# Patient Record
Sex: FEMALE | Race: WHITE | Employment: FULL TIME | URBAN - METROPOLITAN AREA
[De-identification: names, ages, dates, MRNs, and addresses within clinical notes are randomized per-mention and may not be internally consistent; named-entity substitution may affect disease eponyms.]

---

## 2017-06-16 ENCOUNTER — HOSPITAL ENCOUNTER (OUTPATIENT)
Dept: MAMMOGRAPHY | Age: 57
Discharge: HOME | End: 2017-06-16
Attending: PHYSICIAN ASSISTANT
Payer: COMMERCIAL

## 2017-06-16 DIAGNOSIS — Z12.31 ENCOUNTER FOR SCREENING MAMMOGRAM FOR BREAST CANCER: ICD-10-CM

## 2017-06-16 DIAGNOSIS — M85.89 OSTEOPENIA OF MULTIPLE SITES: ICD-10-CM

## 2017-06-16 DIAGNOSIS — M89.9 BONE DISORDER: ICD-10-CM

## 2017-06-16 PROCEDURE — 77080 DXA BONE DENSITY AXIAL: CPT

## 2017-06-16 PROCEDURE — 77067 SCR MAMMO BI INCL CAD: CPT

## 2017-06-16 NOTE — PROGRESS NOTES
Bone density showed osteopenia if fairly stable but slight decline in hips compared to previous study.   Ensure that she is compliant with OTC calcium 600 mg twice daily along with whatever dose vitamin d we decided is working sufficiently for her to keep levels at goal.

## 2017-07-28 ENCOUNTER — HOSPITAL ENCOUNTER (OUTPATIENT)
Dept: GENERAL RADIOLOGY | Age: 57
Discharge: HOME | End: 2017-07-28
Attending: INTERNAL MEDICINE
Payer: COMMERCIAL

## 2017-07-28 DIAGNOSIS — M25.561 ACUTE PAIN OF RIGHT KNEE: ICD-10-CM

## 2017-07-28 DIAGNOSIS — M79.642 PAIN IN LEFT HAND: ICD-10-CM

## 2017-07-28 PROCEDURE — 73560 X-RAY EXAM OF KNEE 1 OR 2: CPT

## 2017-07-28 PROCEDURE — 73130 X-RAY EXAM OF HAND: CPT

## 2017-07-28 NOTE — PROGRESS NOTES
X-ray showed arthritic changes in the knee - most prominent along the inside portion of the knee with joint space narrowing and spurs. Would she like to try physical therapy first?  I think this would be quite beneficial for her.

## 2017-07-28 NOTE — PROGRESS NOTES
X-ray shows increase in arthritic change throughout the hand but primarily at the base of each finger with small spurs.

## 2017-07-28 NOTE — PROGRESS NOTES
Pt notified and verbalized understanding. She states she would like to try physical therapy and would like somewhere in the same area of our office if possible.

## 2017-08-10 ENCOUNTER — HOSPITAL ENCOUNTER (OUTPATIENT)
Dept: PHYSICAL THERAPY | Age: 57
Discharge: HOME | End: 2017-08-10
Attending: PHYSICIAN ASSISTANT
Payer: COMMERCIAL

## 2017-08-10 DIAGNOSIS — M25.561 ACUTE PAIN OF RIGHT KNEE: ICD-10-CM

## 2017-08-10 PROCEDURE — 97161 PT EVAL LOW COMPLEX 20 MIN: CPT

## 2017-08-10 NOTE — PROGRESS NOTES
Ambulatory/Rehab Services H2 Model Falls Risk Assessment    Risk Factor Pts. ·   Confusion/Disorientation/Impulsivity  []    4 ·   Symptomatic Depression  []   2 ·   Altered Elimination  []   1 ·   Dizziness/Vertigo  []   1 ·   Gender (Male)  []   1 ·   Any administered antiepileptics (anticonvulsants):  []   2 ·   Any administered benzodiazepines:  []   1 ·   Visual Impairment (specify):  []   1 ·   Portable Oxygen Use  []   1 ·   Orthostatic ? BP  []   1 ·   History of Recent Falls (within 3 mos.)  []   5     Ability to Rise from Chair (choose one) Pts. ·   Ability to rise in a single movement  [x]   0 ·   Pushes up, successful in one attempt  []   1 ·   Multiple attempts, but successful  []   3 ·   Unable to rise without assistance  []   4   Total: (5 or greater = High Risk) 0     Falls Prevention Plan:   []                Physical Limitations to Exercise (specify):   []                Mobility Assistance Device (type):   []                Exercise/Equipment Adaptation (specify):    ©2010 [facility] [place] Patent #[id number].  Federal Law prohibits the replication, distribution or use without written permission from [facility] [employer]

## 2017-08-10 NOTE — PROGRESS NOTES
Sharla Bret  : 1960 Therapy Center at [address], [address], [address] [facility]  [phone]   [phone]       OUTPATIENT PHYSICAL THERAPY:Initial Assessment 2017    ICD-10: Treatment Diagnosis: Pain in right knee (M25.561)  Precautions/Allergies:   Review of patient's allergies indicates no known allergies. Fall Risk Score: 0 (? 5 = High Risk)  MD Orders: referral to physical therapy  MEDICAL/REFERRING DIAGNOSIS:  Acute pain of right knee [M25.561]   DATE OF ONSET: chronic, 6-8 months ago  REFERRING PHYSICIAN: Aisha Mccall PA  RETURN PHYSICIAN APPOINTMENT: next month for a physical      INITIAL ASSESSMENT:  Ms. Cindy Vanegas presents with complaints of right knee pain. She had previous right knee scope 6 years and her knee started to bother her again about 6-8 months ago. She presents with decreased R LE strength, decreased ROM, and increased pain. She does need to be on her feet all day for work duties. She will benefit from skilled physical therapy to increase functional mobility and help decrease pain. PROBLEM LIST (Impacting functional limitations):  1. Decreased Strength  2. Decreased ADL/Functional Activities  3. Increased Pain INTERVENTIONS PLANNED:  1. Therapeutic Exercise/Strengthening   2. Manual therapy for joint mobilizations and soft tissue mobilization  3. Aquatic therapy  4. Modalities as needed for pain   TREATMENT PLAN:  Effective Dates:17 TO 10-6-17  Frequency/Duration: 1 time a week for 6-8 weeks  GOALS: (Goals have been discussed and agreed upon with patient.)    Discharge Goals: Time Frame: 6-8 weeks  1. Patient will increase right lower extremity strength tot 5/5 MMT grade to improve mobility for work. 2. Pt will report pain 1/10 with daily and work activities. 3. Pt will score 62/80 on LEFS for improved mobility. 4. Pt will be independent with HEP.      Rehabilitation Potential For Stated Goals: Good  Regarding Betina Enciso Rosa's therapy, I certify that the treatment plan above will be carried out by a therapist or under their direction. Thank you for this referral,    Lewis Genao, PT       Referring Physician Signature: RON Traore              Date                      HISTORY:   History of Present Injury/Illness (Reason for Referral):  Knee surgery 6 years ago. Pain started 6-8 months ago. Then back of knee gets tight and hard to bend the knee. Pain only when she bends it or sits for long periods of time and then stands up. R hip hurts when she sits down and get up. She did start taking naproxen last week and feels like that might be helping a little bit. Unable to cross her legs when she sits. No pain at rest. Pain 1/10. Past Medical History/Comorbidities:   HTN, thyroid problems, right knee surgery 6 years ago, appendectomy  Social History/Living Environment:     Lives alone, 2 steps to get inside. Prior Level of Function/Work/Activity:  Works 4/ 10 hour shifts at [employer]. Stands on her feet the whole day with 1 hour lunch break. She wears daskos at work. Would like to be able to get up and down off the floor. Current Medications:  Naproxen, synthroid, ergocalciferol, lisinopril, baby aspirin, glucosamine, vit D   Date Last Reviewed:  8-10-17   Number of Personal Factors/Comorbidities that affect the Plan of Care: 1-2: MODERATE COMPLEXITY   EXAMINATION:   Observation/Orthostatic Postural Assessment:          Pt in no distress.  She ambulates into the clinic with normalized gait  Palpation:          No tenderness around the R knee  ROM:    LLE AROM  L Knee Flexion: 130  L Knee Extension: 2 (hyperextension)    RLE AROM  R Knee Flexion: 125  R Knee Extension: 0      strength:      LLE Strength  L Hip Flexion: 5  L Hip Extension: 5  L Hip ABduction: 5  L Hip Internal Rotation: 5  L Hip External Rotation: 5  L Knee Flexion: 5  L Knee Extension: 5    RLE Strength  R Hip Flexion: 5  R Hip Extension: 4-  R Hip ABduction: 4-  R Hip Internal Rotation: 4-  R Hip External Rotation: 4  R Knee Flexion: 4  R Knee Extension: 5      Special Tests:          Negative knee varus and valgus stress test, negative anterior and posterior drawer R knee, good hip ROM in supine and prone    Body Structures Involved:  1. Joints  2. Muscles Body Functions Affected:  1. Neuromusculoskeletal Activities and Participation Affected:  1. Community, Social and [place]   Number of elements (examined above) that affect the Plan of Care: 3: MODERATE COMPLEXITY   CLINICAL PRESENTATION:   Presentation: Stable and uncomplicated: LOW COMPLEXITY   CLINICAL DECISION MAKING:   Outcome Measure: Tool Used: Lower Extremity Functional Scale (LEFS)  Score:  Initial: 49/80 Most Recent: X/80 (Date: -- )   Interpretation of Score: 20 questions each scored on a 5 point scale with 0 representing \"extreme difficulty or unable to perform\" and 4 representing \"no difficulty\". The lower the score, the greater the functional disability. 80/80 represents no disability. Minimal detectable change is 9 points. Medical Necessity:   · Patient is expected to demonstrate progress in strength, range of motion and balance to improve mobility and decrease pain. Reason for Services/Other Comments:  · Patient continues to require skilled intervention due to decreased R knee ROM and LE strength and increased pain. Use of outcome tool(s) and clinical judgement create a POC that gives a: Clear prediction of patient's progress: LOW COMPLEXITY            TREATMENT:   (In addition to Assessment/Re-Assessment sessions the following treatments were rendered)  Pre-treatment Symptoms/Complaints:  Pain when she bends the knee. Pain: Initial:   1/10 Post Session:  1/10     Therapeutic Exercise: ( 5 minutes):  instructed in initial HEP with quad sets, SLR, side lying hip abduction, clam shell, and sitting B hip IR. Pt performed 10 reps of each exercise.      Treatment/Session Assessment: · Response to Treatment:  Good return demonstration of HEP. · Compliance with Program/Exercises: Will assess as treatment progresses. · Recommendations/Intent for next treatment session: \"Next visit will focus on review and update HEP\".   Total Treatment Duration: 40 minutes  PT Patient Time In/Time Out  Time In: 1525  Time Out: 1605  Jasbir Rios PT

## 2017-08-17 ENCOUNTER — HOSPITAL ENCOUNTER (OUTPATIENT)
Dept: PHYSICAL THERAPY | Age: 57
Discharge: HOME | End: 2017-08-17
Attending: PHYSICIAN ASSISTANT
Payer: COMMERCIAL

## 2017-08-17 NOTE — PROGRESS NOTES
Viridiana Morrison  [facility] 1960 [facility]  [address], [address], [place] [address].  [phone]   [phone]       OUTPATIENT PHYSICAL THERAPY:Cancellation Note 2017    ICD-10: Treatment Diagnosis: Pain in right knee (M25.561)  Precautions/Allergies:   Review of patient's allergies indicates no known allergies. Fall Risk Score: 0 (? 5 = High Risk)  MD Orders: referral to physical therapy  MEDICAL/REFERRING DIAGNOSIS:  Acute pain of right knee [M25.561]   DATE OF ONSET: chronic, 6-8 months ago  REFERRING PHYSICIAN: Aisha Mccall PA  RETURN PHYSICIAN APPOINTMENT: next month for a physical         MsHerb Lesli Isidro called and cancelled her physical therapy appointment for today.

## 2017-08-24 ENCOUNTER — HOSPITAL ENCOUNTER (OUTPATIENT)
Dept: PHYSICAL THERAPY | Age: 57
Discharge: HOME | End: 2017-08-24
Attending: PHYSICIAN ASSISTANT
Payer: COMMERCIAL

## 2017-08-24 PROCEDURE — 97110 THERAPEUTIC EXERCISES: CPT

## 2017-08-24 NOTE — PROGRESS NOTES
Rsoy Cristhian  : 1960 Therapy Center at [facility] [address]  [address], [address], [address] [facility]  [phone]   [phone]       OUTPATIENT PHYSICAL THERAPY:Daily Note 2017    ICD-10: Treatment Diagnosis: Pain in right knee (M25.561)  Precautions/Allergies:   Review of patient's allergies indicates no known allergies. Fall Risk Score: 0 (? 5 = High Risk)  MD Orders: referral to physical therapy  MEDICAL/REFERRING DIAGNOSIS:  Acute pain of right knee [M25.561]   DATE OF ONSET: chronic, 6-8 months ago  REFERRING PHYSICIAN: Aisha Mccall PA  RETURN PHYSICIAN APPOINTMENT: next month for a physical      INITIAL ASSESSMENT:  Ms. Alexis Suero presents with complaints of right knee pain. She had previous right knee scope 6 years and her knee started to bother her again about 6-8 months ago. She presents with decreased R LE strength, decreased ROM, and increased pain. She does need to be on her feet all day for work duties. She will benefit from skilled physical therapy to increase functional mobility and help decrease pain. PROBLEM LIST (Impacting functional limitations):  1. Decreased Strength  2. Decreased ADL/Functional Activities  3. Increased Pain INTERVENTIONS PLANNED:  1. Therapeutic Exercise/Strengthening   2. Manual therapy for joint mobilizations and soft tissue mobilization  3. Aquatic therapy  4. Modalities as needed for pain   TREATMENT PLAN:  Effective Dates:17 TO 10-6-17  Frequency/Duration: 1 time a week for 6-8 weeks  GOALS: (Goals have been discussed and agreed upon with patient.)    Discharge Goals: Time Frame: 6-8 weeks  1. Patient will increase right lower extremity strength tot 5/5 MMT grade to improve mobility for work. 2. Pt will report pain 1/10 with daily and work activities. 3. Pt will score 62/80 on LEFS for improved mobility. 4. Pt will be independent with HEP.      Rehabilitation Potential For Stated Goals: Good                HISTORY: History of Present Injury/Illness (Reason for Referral):  Knee surgery 6 years ago. Pain started 6-8 months ago. Then back of knee gets tight and hard to bend the knee. Pain only when she bends it or sits for long periods of time and then stands up. R hip hurts when she sits down and get up. She did start taking naproxen last week and feels like that might be helping a little bit. Unable to cross her legs when she sits. No pain at rest. Pain 1/10. Past Medical History/Comorbidities:   HTN, thyroid problems, right knee surgery 6 years ago, appendectomy  Social History/Living Environment:     Lives alone, 2 steps to get inside. Prior Level of Function/Work/Activity:  Works 4/ 10 hour shifts at [employer]. Stands on her feet the whole day with 1 hour lunch break. She wears daskos at work. Would like to be able to get up and down off the floor. Current Medications:  Naproxen, synthroid, ergocalciferol, lisinopril, baby aspirin, glucosamine, vit D   Date Last Reviewed:  8-24-17   Number of Personal Factors/Comorbidities that affect the Plan of Care: 1-2: MODERATE COMPLEXITY   EXAMINATION:   Observation/Orthostatic Postural Assessment:          Pt in no distress. She ambulates into the clinic with normalized gait  Palpation:          No tenderness around the R knee  ROM:                strength:                  Special Tests:          Negative knee varus and valgus stress test, negative anterior and posterior drawer R knee, good hip ROM in supine and prone    Body Structures Involved:  1. Joints  2. Muscles Body Functions Affected:  1. Neuromusculoskeletal Activities and Participation Affected:  1. Community, Social and [place]   Number of elements (examined above) that affect the Plan of Care: 3: MODERATE COMPLEXITY   CLINICAL PRESENTATION:   Presentation: Stable and uncomplicated: LOW COMPLEXITY   CLINICAL DECISION MAKING:   Outcome Measure:    Tool Used: Lower Extremity Functional Scale (LEFS)  Score:  Initial: 49/80 Most Recent: X/80 (Date: -- )   Interpretation of Score: 20 questions each scored on a 5 point scale with 0 representing \"extreme difficulty or unable to perform\" and 4 representing \"no difficulty\". The lower the score, the greater the functional disability. 80/80 represents no disability. Minimal detectable change is 9 points. Medical Necessity:   · Patient is expected to demonstrate progress in strength, range of motion and balance to improve mobility and decrease pain. Reason for Services/Other Comments:  · Patient continues to require skilled intervention due to decreased R knee ROM and LE strength and increased pain. Use of outcome tool(s) and clinical judgement create a POC that gives a: Clear prediction of patient's progress: LOW COMPLEXITY            TREATMENT:   (In addition to Assessment/Re-Assessment sessions the following treatments were rendered)  Pre-treatment Symptoms/Complaints:  Pain when she bends the knee. Pain: Initial:   1/10 Post Session:  1/10     Therapeutic Exercise: (25 Minutes): LE strengthening exercise per grid below. Moderate visual, verbal cues for technique. Date:  8-24-17 Date:   Date:     Activity/Exercise Parameters Parameters Parameters   Quad set 20x     SLR 2x10     Side lying hip abduction 2x10     Clam  yellow 2x10     Reverse clam Yellow 2x10     Long arc quad 3#2x10     Shuttle press 50# 2x10         Treatment/Session Assessment:    · Response to Treatment:  No complaints of pain with exercises. · Compliance with Program/Exercises: Will assess as treatment progresses. · Recommendations/Intent for next treatment session: \"Next visit will focus on review and update HEP\".   Total Treatment Duration: 25 minutes  PT Patient Time In/Time Out  Time In: 1015  Time Out: 29 Adwoa Lowe

## 2017-10-25 NOTE — PROGRESS NOTES
Rubina Lawler  : 1960 Therapy Center at [facility] [address]  [facility], [address], [address] [facility]  [phone]   [phone]       OUTPATIENT PHYSICAL THERAPY:Discontinuation Summary 10/25/2017    ICD-10: Treatment Diagnosis: Pain in right knee (M25.561)  Precautions/Allergies:   Review of patient's allergies indicates no known allergies. Fall Risk Score: 0 (? 5 = High Risk)  MD Orders: referral to physical therapy  MEDICAL/REFERRING DIAGNOSIS:  Acute pain of right knee [M25.561]   DATE OF ONSET: chronic, 6-8 months ago  REFERRING PHYSICIAN: Pelon Mccall 89, PA  RETURN PHYSICIAN APPOINTMENT: next month for a physical    Rubina Lawler has been seen in physical therapy from 8-10-17 to 17 for 2 visits. Treatment has been discontinued at this time due to patient failing to return for additional treatment. Unable to assess goals due to patient attending 2 physical therapy visits. Thank you for this referral.           PLAN:  Discontinue patient from physical therapy     GOALS: (Goals have been discussed and agreed upon with patient.)    Discharge Goals: Time Frame: 6-8 weeks UNABLE TO ASSESS   1. Patient will increase right lower extremity strength tot 5/5 MMT grade to improve mobility for work. 2. Pt will report pain 1/10 with daily and work activities. 3. Pt will score 62/80 on LEFS for improved mobility. 4. Pt will be independent with HEP.      Rehabilitation Potential For Stated Goals: Good    THANK YOU FOR THIS REFERRAL,     Haily HERMAN, pt

## 2018-05-24 ENCOUNTER — HOSPITAL ENCOUNTER (OUTPATIENT)
Dept: GENERAL RADIOLOGY | Age: 58
Discharge: HOME | End: 2018-05-24
Attending: PHYSICIAN ASSISTANT
Payer: COMMERCIAL

## 2018-05-24 DIAGNOSIS — M79.674 GREAT TOE PAIN, RIGHT: ICD-10-CM

## 2018-05-24 PROCEDURE — 73660 X-RAY EXAM OF TOE(S): CPT

## 2018-05-24 NOTE — PROGRESS NOTES
I called pt and informed. Pt verbalized understanding. I tried scheduling the appt for her due to no one was here to schedule. I could not make the appt because I don't know how to. Pt was rude to me. I apologized and explained that I only been working here for two weeks and that's the reason I cant make the appt. . Pt just hung up on me

## 2018-05-24 NOTE — PROGRESS NOTES
Due to location and persistence of pain she needs to come in for treatment. We need to do some labs to rule out gout since x-ray is negative.

## 2018-10-18 ENCOUNTER — HOSPITAL ENCOUNTER (OUTPATIENT)
Dept: MAMMOGRAPHY | Age: 58
Discharge: HOME | End: 2018-10-18
Attending: PHYSICIAN ASSISTANT
Payer: COMMERCIAL

## 2018-10-18 DIAGNOSIS — Z12.31 ENCOUNTER FOR SCREENING MAMMOGRAM FOR BREAST CANCER: ICD-10-CM

## 2018-10-18 PROCEDURE — 77067 SCR MAMMO BI INCL CAD: CPT

## 2018-10-19 NOTE — PROGRESS NOTES
Mammogram showed scattered fibroglandular tissue & benign appearing calcifications which are unchanged. Remind to do monthly self breast exams. Plan to repeat imaging in 1 year.

## 2018-12-26 ENCOUNTER — HOSPITAL ENCOUNTER (OUTPATIENT)
Dept: GENERAL RADIOLOGY | Age: 58
Discharge: HOME | End: 2018-12-26
Attending: NURSE PRACTITIONER
Payer: COMMERCIAL

## 2018-12-26 DIAGNOSIS — S89.92XA KNEE INJURY, LEFT, INITIAL ENCOUNTER: ICD-10-CM

## 2018-12-26 PROCEDURE — 73562 X-RAY EXAM OF KNEE 3: CPT

## 2018-12-28 ENCOUNTER — HOSPITAL ENCOUNTER (OUTPATIENT)
Dept: MRI IMAGING | Age: 58
Discharge: HOME | End: 2018-12-28
Attending: NURSE PRACTITIONER
Payer: COMMERCIAL

## 2018-12-28 DIAGNOSIS — M25.462 EFFUSION OF LEFT KNEE JOINT: ICD-10-CM

## 2018-12-28 PROCEDURE — 73721 MRI JNT OF LWR EXTRE W/O DYE: CPT

## 2019-03-04 ENCOUNTER — HOSPITAL ENCOUNTER (OUTPATIENT)
Dept: GENERAL RADIOLOGY | Age: 59
Discharge: HOME | End: 2019-03-04
Attending: NURSE PRACTITIONER
Payer: COMMERCIAL

## 2019-03-04 PROCEDURE — 72100 X-RAY EXAM L-S SPINE 2/3 VWS: CPT

## 2019-03-15 ENCOUNTER — HOSPITAL ENCOUNTER (OUTPATIENT)
Dept: PHYSICAL THERAPY | Age: 59
Discharge: HOME | End: 2019-03-15
Attending: NURSE PRACTITIONER
Payer: COMMERCIAL

## 2019-03-15 DIAGNOSIS — M47.816 SPONDYLOSIS OF LUMBAR REGION WITHOUT MYELOPATHY OR RADICULOPATHY: ICD-10-CM

## 2019-03-15 PROCEDURE — 97161 PT EVAL LOW COMPLEX 20 MIN: CPT

## 2019-03-15 PROCEDURE — 97110 THERAPEUTIC EXERCISES: CPT

## 2019-03-15 NOTE — PROGRESS NOTES
Seth Rogers  : 1960  Payor: Michael Parry / Plan: Ghazal Button RPN / Product Type: [employer]ical /  [address] at [address] [facility], [address], [address]  [phone]   [phone]                                                          Paula Benoit NP      OUTPATIENT PHYSICAL THERAPY: Daily Treatment Note 3/15/2019 Visit Count:  1     ICD-10: Treatment Diagnosis:    Low back pain (M54.5)        Muscle weakness (generalized) (M62.81)   Muscle spasm of back (M62.830)       Pre-treatment Symptoms/Complaints:  See initial eval  Pain: Initial:   5/10 Post Session:  5/10   Medications Last Reviewed:  3/15/2019   Updated Objective Findings:  See initial eval   TREATMENT:   THERAPEUTIC EXERCISE: (20 minutes):  Exercises per grid below to improve mobility, strength and balance. Required minimal visual, verbal and manual cues to promote proper body alignment and promote proper body posture. Progressed resistance and complexity of movement as indicated. Date:  3/15/19 Date:   Date:     Activity/Exercise Parameters Parameters Parameters   EDU POC, HEP, PT goals     L stretch 3x10\"     Lumbar rotation 20x     Piriformis stretch 2x30\"     Body mechanics Squatting, bending, equal WB in standing                       MANUAL THERAPY: (5 minutes): Joint mobilization and Soft tissue mobilization was utilized and necessary because of the patient's restricted joint motion and restricted motion of soft tissue.   -Lumbar paraspinals: strumming  -Lumbar joint mobility:grade II for pain gaiting    [facility]  Treatment/Session Summary:    · Response to Treatment:  Pt demonstrated understanding of POC and intial HEP. 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   · Communication/Consultation:  HEP handout and body mechanics reviewed for back saftey with llifting  · Equipment provided today:  HEP handout  Recommendations/Intent for next treatment session: Next visit will focus on increase in reps/sets and weights/resistance as tolerated. Treatment Plan of Care Effective Dates: 3/15/19 TO 6/13/2019 (90 days).   Frequency/Duration: 2 times a week for 90 Days  Total Treatment Billable Duration:  45 Rx; 20 therex, 25 eval  PT Patient Time In/Time Out  Time In: 1057  Time Out: [place]   Date Time Provider Curtis Aldridge   3/22/2019  8:00 AM Fredda Kicks N [facility]   3/27/2019  1:00 PM Estle Anger, DPT [facility]   3/29/2019 11:00 AM Fredda Kicks N [facility]   4/2/2019  4:15 PM Fredda Kicks N [facility]   4/5/2019 10:15 AM Fredda Kicks N [facility]   4/9/2019  4:15 PM Fredda Kicks N [facility]   4/11/2019  8:55 AM [facility]   4/12/2019 11:00 AM Estle Anger, DPT [facility]   4/16/2019  4:15 PM Fredda Kicks N [facility]   4/18/2019 10:00 AM Nilson Harvey PA-C [facility]   4/19/2019 11:00 AM Fredda Kicks N [facility]   4/23/2019  4:15 PM Fredda Kicks N [facility]   4/26/2019 11:00 AM Tim Lock [facility]

## 2019-03-15 NOTE — THERAPY EVALUATION
Yanick Dodd  : 1960      Payor: Zenaida Vogt / Plan: Amalia Willard RPN / Product Type: Commerical /  Alix Fess at [address] [place] [address]. [address] [place] [address] [address] [place] [address], [facility], [address] [place] [address]  [phone]   [phone]       OUTPATIENT PHYSICAL THERAPY:Initial Assessment 3/15/2019    ICD-10: Treatment Diagnosis:    Low back pain (M54.5)        Muscle weakness (generalized) (M62.81)   Muscle spasm of back (H28.076)           Precautions/Allergies:   Patient has no known allergies. Fall Risk Score: 5 (? 5 = High Risk)  MD Orders: Eval and Treat  MEDICAL/REFERRING DIAGNOSIS:  Spondylosis of lumbar region without myelopathy or radiculopathy [M47.816]   DATE OF ONSET: 9 years  REFERRING PHYSICIAN: Nanette Blanton NP  RETURN PHYSICIAN APPOINTMENT: TBD by pt     INITIAL ASSESSMENT:  Ms. Yanick Dodd presents to physical therapy with decreased postural and hip/core strength, ROM, joint mobility, flexibility, functional mobility, and increased pain, and poor body mechanics with functional lifting. No pelvic malalignment upon initial evaluation but decreased posture. These S/S are consistent with low back pain, muscle weakness, muscle spasms of back. Yanick Dodd will benefit from skilled physical therapy (medically necessary) to address above deficits affecting participation in basic ADLs and functional mobility/tolerance. Patient will benefit from manual therapeutic techniques (stretching, joint mobilizations, TDN, and soft tissue mobilization/myofascial release), therapeutic exercises and activities, postural strengthening/education, and comprehensive home exercises program to address current impairments and functional limitations. PROBLEM LIST (Impacting functional limitations):  1. Decreased Strength  2. Decreased ADL/Functional Activities  3. Decreased Transfer Abilities  4. Decreased Ambulation Ability/Technique  5. Decreased Balance  6. Increased Pain  7.  Decreased Activity Tolerance  8. Increased Fatigue  9. Increased Shortness of Breath  10. Decreased Flexibility/Joint Mobility  11. Decreased [place] with Home Exercise Program INTERVENTIONS PLANNED:  1. Balance Exercise  2. Bed Mobility  3. Cold  4. Cryotherapy  5. Electrical Stimulation  6. Family Education  7. Gait Training  8. Heat  9. Home Exercise Program (HEP)  10. Manual Therapy (including TDN)  11. Neuromuscular Re-education/Strengthening  12. Range of Motion (ROM)  13. Therapeutic Activites  14. Therapeutic Exercise/Strengthening  15. Transfer Training  16. Lumbar Traction   TREATMENT PLAN:  Effective Dates: 3/15/19 TO 6/13/2019 (90 days). Frequency/Duration:1- 2 times a week for 90 Days  GOALS: (Goals have been discussed and agreed upon with patient.)  Short Term Goals 4 weeks   1. Caden Jimenes will be independent with HEP  2. Caden Jimenes will participate in LE stretching program to increase flexibility  3. Caden Jimenes will participate in core stabilization exercises to help with stabilization during ADLs  4. Caden Jimenes will participate in LE strengthening program with weights/resistance as appropriate to help with gait and elevations  5. Caden Jimenes will participate in static and dynamic balance activities to decrease the risk for falls   6. Caden Jimenes will tolerate manual therapy/joint mobilizations/soft tissue to increase ROM and decrease pain  7. Long Term Goals 8 weeks   1. Caden Jimenes will demonstrate a 10 point improvement on the Oswestry to show improvement in function  2. Caden Jimenes will report 0/10 pain at rest and during ADLs  3. Caden Jimenes will demonstrate 5/5 LE strength on manual muscle testing  4. Caden Jimenes will be able to perform SLS >30 seconds bilaterally to help with gait and improve balance  5.  Caden Jimenes will be able to perform 10 squats to pick objects off the ground varying in weight from 5-10# and carry >10ft with appropriate form and min to no compensation/pain for reducing stress on lower back to improve her ability to  and carry animals at her job as a . Rehabilitation Potential For Stated Goals: Good  Regarding Roberto Lee's therapy, I certify that the treatment plan above will be carried out by a therapist or under their direction. Thank you for this referral,  Maurice Combs     Referring Physician Signature: Tesfaye Evans NP              Date                    HISTORY:   History of Present Injury/Illness (Reason for Referral):  Rosy Morrow states she has always had low back pain for the past 9 years and it seems to radiate into her R hip now. 9 years ago she was in the ER for a pulled muscle in her lower back with no BAILEY, since then it has been constant that worsens when she gets sick. She denies N/T in the legs. She states about 3 months ago falling and injuring L knee (menicus tear) that did not help her back at all.    -Present symptoms/complaints (on day of evaluation) aching in the R hip   Pain Scale:  · Current: 5/10  · Best: 0/10  · Worst: 5-6/10      · Aggravating factors: laying on R side, going up/down steps due to \"bad knees\", bending over   · Relieving factors: maybe hot water      Past Medical History/Comorbidities:   Ms. Alexis Suero  has a past medical history of Hashimoto's disease, Hemorrhoids (08/28/2017), Hypertension, Hypothyroidism, acquired, autoimmune, Migraine headache, Osteoarthritis, Osteopenia (4/23/2015), Spondylosis of lumbar region without myelopathy or radiculopathy, Trigger ring finger, Vitamin B12 deficiency (12/16/2013), and Vitamin D deficiency (12/16/2013). She also has no past medical history of COPD, Dementia, Heart failure ([facility] [address] Infectious disease, Other ill-defined conditions(799.89), Psychiatric disorder, or Sleep disorder.   Ms. Alexis Suero  has a past surgical history that includes hx appendectomy; hx knee arthroscopy (Right, 2009); hx tonsillectomy; hx ovarian cyst removal (Right); and hx colonoscopy (08/28/2017). Social History/Living Environment:        Social History     Socioeconomic History    Marital status: SINGLE     Spouse name: Not on file    Number of children: Not on file    Years of education: Not on file    Highest education level: Not on file   Social Needs    Financial resource strain: Not on file    Food insecurity - worry: Not on file    Food insecurity - inability: Not on file   [employer] needs - medical: Not on file   [employer] needs - non-medical: Not on file   Occupational History    Not on file   Tobacco Use    Smoking status: Never Smoker    Smokeless tobacco: Never Used   Substance and Sexual Activity    Alcohol use:  Yes     Alcohol/week: 0.6 - 1.2 oz     Types: 1 - 2 Glasses of wine per week    Drug use: No    Sexual activity: Not on file   Other Topics Concern    Not on file   Social History Narrative    Not on file     Prior Level of Function/Work/Activity:  Full time   Previous Treatment Approach  None; besides pain medication- tylenol and meloxiocam   Dominant Side : R  Other Clinical Tests  X-rays= spondylosis    Active Ambulatory Problems     Diagnosis Date Noted    Essential hypertension, benign 12/16/2013    Hypothyroidism, acquired, autoimmune 12/16/2013    Hyperlipidemia with target low density lipoprotein (LDL) cholesterol less than 130 mg/dL 12/16/2013    Vitamin B12 deficiency 12/16/2013    Vitamin D deficiency 12/16/2013    Right foot pain 02/09/2015    Great toe pain 02/09/2015     Resolved Ambulatory Problems     Diagnosis Date Noted    No Resolved Ambulatory Problems     Past Medical History:   Diagnosis Date    Hashimoto's disease     Hemorrhoids 08/28/2017    Hypertension     Hypothyroidism, acquired, autoimmune     Migraine headache     Osteoarthritis     Osteopenia 4/23/2015    Spondylosis of lumbar region without myelopathy or radiculopathy     Trigger ring finger     Vitamin B12 deficiency 12/16/2013    Vitamin D deficiency 12/16/2013     Note: Patient denies any increase of symptoms with cough, sneeze or valsalva. Patient denies any saddle paresthesia or bowel/bladder deficits. Current Medications:    Current Outpatient Medications:     diclofenac EC (VOLTAREN) 75 mg EC tablet, TAKE 1 TABLET BY MOUTH TWICE A DAY AS NEEDED, Disp: , Rfl: 0    methylPREDNISolone (MEDROL DOSEPACK) 4 mg tablet, Take as directed., Disp: 1 Dose Pack, Rfl: 0    meloxicam (MOBIC) 7.5 mg tablet, Take 1-2 tablets po daily with food, Disp: 180 Tab, Rfl: 1    lisinopril (PRINIVIL, ZESTRIL) 5 mg tablet, Take 1 Tab by mouth daily. , Disp: 90 Tab, Rfl: 3    levothyroxine (SYNTHROID) 50 mcg tablet, Take 1 Tab by mouth Daily (before breakfast). , Disp: 90 Tab, Rfl: 3    ergocalciferol (ERGOCALCIFEROL) 50,000 unit capsule, Take 1 Cap by mouth every seven (7) days. , Disp: 12 Cap, Rfl: 3    acetaminophen (TYLENOL ARTHRITIS PAIN) 650 mg TbER, Take 650 mg by mouth every eight (8) hours as needed. , Disp: , Rfl:     cyclobenzaprine (FLEXERIL) 10 mg tablet, Take 1 Tab by mouth nightly as needed for Muscle Spasm(s). , Disp: 30 Tab, Rfl: 0    aspirin delayed-release 81 mg tablet, Take 81 mg by mouth daily. , Disp: , Rfl:     glucosamine-chondroitin (ARTHX) 500-400 mg cap, Take 1 Cap by mouth daily. , Disp: , Rfl:         Ambulatory/Rehab Services H2 Model Falls Risk Assessment    Risk Factors:       (5)  History of Recent Falls [w/in 3 months] Ability to Rise from Chair:       (0)  Ability to rise in a single movement    Falls Prevention Plan:       Exercise/Equipment Adaptation (specify):  balance exercises to be incorporated in therapy with PT present   Total: (5 or greater = High Risk): 5    ©2010 [facility] [employer]. All Rights Reserved. [facility] States Patent #6,[id number].  Federal Law prohibits the replication, distribution or use without written permission from [employer]       Date Last Reviewed:  3/15/2019   Number of Personal Factors/Comorbidities that affect the Plan of Care: 1-2: MODERATE COMPLEXITY   EXAMINATION:   Observation/Orthostatic Postural Assessment:          Seated upright  Palpation:          TTP along lumbar spinous processes and paraspinals and R glutes and piriformis  ROM:            AROM/PROM         Joint: Eval Date: 3/15/19  Re-Assess Date:  Re-Assess Date:    Active ROM RIGHT LEFT RIGHT LEFT RIGHT LEFT   Knee Extension         Knee Flexion         Hip Flexion         Hip Abduction         Lumbar Flexion 90        Lumbar Extension 35        Lumbar Side-bending 26 25       Lumbar Rotation           Strength:     Eval Date: 3/15/19  Re-Assess Date:  Re-Assess Date:      RIGHT LEFT RIGHT LEFT RIGHT LEFT   Knee Flexion (L5-S2) 4+/5 3+/5 * due to meniscus injury       Knee Extension (L3, L4) 4+/5 4+/5       Hip Flexion (L1, L2) 4+/5 4+/5       Hip Extension         Hip Abduction (L5, S1)         Ankle Dorsiflexion          Great Toe Extension (L5)         Ankle Plantar Flexion (S1-S2)             Single leg stance right/left: 10 seconds each              Deep squat: lumbar bending, NBOS; improved after verbal and visual cues    Ham 90/90:   RIGHT LE: 80   LEFT LE: 80    Special Tests:   · SHARAN= +L  · SLR (<60 degrees)=-  · SLUMP=-      Joint Mobility Eval Date: 3/15/19  Re-Assess Date:  Re-Assess Date:     RIGHT LEFT RIGHT LEFT RIGHT LEFT   Lumbar hypomobile        Sacroiliac         Hip [facility]       Thoracic           Neurological Screen:    RADIATING SYMPTOMS?: NO  · T12-L1 (inguinal region and medial thigh)=   · L1-2 ( Ant/Med proximal thigh)=   · L2-3 (Ant/Lat thigh)=   · L3-4 (Post/Lat thigh and Ant tibial region)=   · L4-5 (Dorsum foot)=   · L5-S1 (Lateral foot)=   Functional Mobility:  Affecting participation in basic ADLs and functional tasks. Body Structures Involved:  1. Bones  2. Joints  3. Muscles  4. Ligaments Body Functions Affected:  1. Sensory/Pain  2. Neuromusculoskeletal  3.  Movement Related Activities and Participation Affected:  1. Mobility  2. Self Care   Number of elements that affect the Plan of Care: 1-2: LOW COMPLEXITY   CLINICAL PRESENTATION:   Presentation: Stable and uncomplicated: LOW COMPLEXITY   CLINICAL DECISION MAKING:   Outcome Measure: Tool Used: Modified Oswestry Low Back Pain Questionnaire  Score:  Initial: 21/50  Most Recent: X/50 (Date: -- )   Interpretation of Score: Each section is scored on a 0-5 scale, 5 representing the greatest disability. The scores of each section are added together for a total score of 50. Score 0 1-10 11-20 21-30 31-40 41-49 50   Modifier CH CI CJ CK CL CM CN     ? Mobility - Walking and Moving Around:     - CURRENT STATUS: CK - 40%-59% impaired, limited or restricted    - GOAL STATUS: CI - 1%-19% impaired, limited or restricted    - D/C STATUS:  ---------------To be determined---------------    Medical Necessity:   · Skilled intervention continues to be required due to above deficits affecting participation in basic ADLs and overall functional tolerance. Reason for Services/Other Comments:  · Patient continues to require skilled intervention due to  above deficits affecting participation in basic ADLs and overall functional tolerance.    Use of outcome tool(s) and clinical judgement create a POC that gives a: Clear prediction of patient's progress: LOW COMPLEXITY                      Future Appointments   Date Time Provider Curtis Aldridge   3/22/2019  8:00 AM Ujn Starring N [facility]   3/27/2019  1:00 PM KERVIN Conn[facility]OSRPT [facility]   3/29/2019 11:00 AM Jun Starring N [facility]   4/2/2019  4:15 PM Jun Starring N [facility]   4/5/2019 10:15 AM Jun Starring N [facility]   4/9/2019  4:15 PM Jun Starring N [facility]   4/11/2019  8:55 AM MAT LAB [facility]   4/12/2019 11:00 AM KERVIN ConnorsT [facility]   4/16/2019  4:15 [facility]   4/18/2019 10:00 AM Michelle Baez PA-C [facility] MAT MAT   4/19/2019 11:00 AM [facility]   4/23/2019  4:15 PM [facility]   4/26/2019 11:00 AM Isidro [facility]       Total Treatment Duration:50 mins; 20 mins therex, 25 mins eval (5mins manual)  PT Patient Time In/Time Out  Time In: 1057  Time Out: [address]

## 2019-03-22 ENCOUNTER — HOSPITAL ENCOUNTER (OUTPATIENT)
Dept: PHYSICAL THERAPY | Age: 59
Discharge: HOME | End: 2019-03-22
Attending: NURSE PRACTITIONER
Payer: COMMERCIAL

## 2019-03-22 PROCEDURE — 97110 THERAPEUTIC EXERCISES: CPT

## 2019-03-22 PROCEDURE — 97140 MANUAL THERAPY 1/> REGIONS: CPT

## 2019-03-22 NOTE — PROGRESS NOTES
Jose M Taylor  : 1960  Payor: Dilan Cavanaugh / Plan: Kishan Rater RPN / Product Type: Commerical /  Dietra Patee at [facility]. [facility], [address] [place] [address]  [phone]   [phone]                                                          Jeremiah Oconnell NP      OUTPATIENT PHYSICAL THERAPY: Daily Treatment Note 3/22/2019 Visit Count:  2     ICD-10: Treatment Diagnosis:    Low back pain (M54.5)        Muscle weakness (generalized) (M62.81)   Muscle spasm of back (M62.830)       Pre-treatment Symptoms/Complaints:  R hip is bothering me today. Exercises have helped some at home and see some improvement already  Pain: Initial:   5/10 Post Session:  5/10   Medications Last Reviewed:  3/22/2019   Updated Objective Findings:  Limited piriformis muscle length, trigger point in gluts, hypomobile lumbar mobility (L1-4)   TREATMENT:   THERAPEUTIC EXERCISE: (23 minutes):  Exercises per grid below to improve mobility, strength and balance. Required minimal visual, verbal and manual cues to promote proper body alignment and promote proper body posture. Progressed resistance and complexity of movement as indicated.      Date:  3/15/19 Date:  3/22/19 Date:     Activity/Exercise Parameters Parameters Parameters   EDU POC, HEP, PT goals     L stretch 3x10\" 5x10\"    Lumbar rotation 20x     Piriformis stretch 2x30\" 2x30\"    Body mechanics Squatting, bending, equal WB in standing     Cat/cow  10x    Prone hip ext  10x R    Bird dogs  10x each    Single knee to chest  3x30\" each    STS  10x overhead press    Treadmill  6 mins 1.5 mph    TA  Brace with marching  10x                MANUAL THERAPY: (15 minutes): Joint mobilization and Soft tissue mobilization was utilized and necessary because of the patient's restricted joint motion and restricted motion of soft tissue.   -Lumbar paraspinals: strumming  -Lumbar joint mobility:grade II for pain gaiting  -piriformis stretch/release/passive stretch    MedBridge Portal  Treatment/Session Summary:    · Response to Treatment: Good response to exercises today with no increase in pain. Limited Piriformis muscle length that improved after manual interventions. Visual cues required for form with exercises, nino for maintaining TA contraction with bird-dogs  · Communication/Consultation:  Equal WB with stance  · Equipment provided today:  HEP handout  Recommendations/Intent for next treatment session: Next visit will focus on increase in reps/sets and weights/resistance as tolerated. Treatment Plan of Care Effective Dates: 3/15/19 TO 6/13/2019 (90 days).   Frequency/Duration: 2 times a week for 90 Days  Total Treatment Billable Duration:  38 mins  PT Patient Time In/Time Out  Time In: 0800  Time Out: 44[address] [facility] Appointments   Date Time Provider Curtis Aldridge   3/27/2019  1:00 PM Richardson Condon, DPT [facility]   3/29/2019 11:00 AM Garold Barter N [facility]   4/2/2019  4:15 PM Garold Barter N [facility]   4/5/2019 10:15 AM Garold Barter N [facility]   4/9/2019  4:15 PM Garold Barter N [facility]   4/11/2019  8:55 AM MAT LAB [facility] MAT MAT   4/12/2019 11:00 AM Richardson Condon DPT [facility]   4/16/2019  4:15 PM Garold Barter N [facility]   4/18/2019 10:00 AM Jose Elias Wilburn PA-C [facility] MAT MAT   4/19/2019 11:00 AM Garold Barter N [facility]   4/23/2019  4:15 PM Garold Barter N [facility]   4/26/2019 11:00 AM Mallory Medina [facility]

## 2019-03-27 ENCOUNTER — HOSPITAL ENCOUNTER (OUTPATIENT)
Dept: PHYSICAL THERAPY | Age: 59
Discharge: HOME | End: 2019-03-27
Attending: NURSE PRACTITIONER
Payer: COMMERCIAL

## 2019-03-27 NOTE — PROGRESS NOTES
Kim Flores  : 1960  Payor: Chantelle Rodriguez / Plan: Eva Senior RPN / Product Type: Commerical /  [address] at [address]. [address] [place] [address] [address] [place] [address], [address] [place] [address]  [phone]   [phone]        OUTPATIENT DAILY NOTE    NAME/AGE/GENDER: Kim Flores is a 63 y.o. female. DATE: 3/27/2019  Kim Flores cancelled--information provided via front office.       ANGELY WilliamsonT

## 2019-03-29 ENCOUNTER — HOSPITAL ENCOUNTER (OUTPATIENT)
Dept: PHYSICAL THERAPY | Age: 59
Discharge: HOME | End: 2019-03-29
Attending: NURSE PRACTITIONER
Payer: COMMERCIAL

## 2019-03-29 PROCEDURE — 97110 THERAPEUTIC EXERCISES: CPT

## 2019-03-29 PROCEDURE — 97140 MANUAL THERAPY 1/> REGIONS: CPT

## 2019-03-29 NOTE — PROGRESS NOTES
Brayan Kelsey  : 1960  Payor: Ramesh Maya / Plan: Kath Woods RPN / Product Type: [facility] /  [address] [place] [address] at [address] [place] [address]. [facility], [address] [place] [address], [address] [place] [address]  [phone]   [phone]                                                          Elvia Hermosillo NP      OUTPATIENT PHYSICAL THERAPY: Daily Treatment Note 3/29/2019 Visit Count:  3     ICD-10: Treatment Diagnosis:    Low back pain (M54.5)        Muscle weakness (generalized) (M62.81)   Muscle spasm of back (M62.830)       Pre-treatment Symptoms/Complaints:  R hip is really the only thing bothering me. Back feeling a lot better. Pain: Initial:   5/10 Post Session:  3/10   Medications Last Reviewed:  3/29/2019   Updated Objective Findings:  Limited piriformis muscle length, trigger point in gluts, hypomobile lumbar mobility (L1-4)   TREATMENT:   THERAPEUTIC EXERCISE: (20 minutes):  Exercises per grid below to improve mobility, strength and balance. Required minimal visual, verbal and manual cues to promote proper body alignment and promote proper body posture. Progressed resistance and complexity of movement as indicated. Date:  3/15/19 Date:  3/22/19 Date:  3/29/19   Activity/Exercise Parameters Parameters Parameters   EDU POC, HEP, PT goals     L stretch 3x10\" 5x10\" 5x10\"   Lumbar rotation 20x     Piriformis stretch 2x30\" 2x30\" 3x30\"   Body mechanics Squatting, bending, equal WB in standing     Cat/cow  10x 10x   Prone hip ext  10x R    Bird dogs  10x each 10x- L knee hurt   Single knee to chest  3x30\" each    STS  10x overhead press 10x overhead press   Treadmill  6 mins 1.5 mph 8 mins 2.0 mph incline 2   TA  Brace with marching  10x 20x and 10x dead bugs   Standing hip ext/abd   Green band 10 each         MANUAL THERAPY: (15 minutes): Joint mobilization and Soft tissue mobilization was utilized and necessary because of the patient's restricted joint motion and restricted motion of soft tissue. -NOT TODAY: Lumbar paraspinals: strumming  -NOT TODAY: Lumbar joint mobility:grade II for pain gaiting  -piriformis stretch/release/passive stretch  -Long axis traction (R) hip grade II-III    MedBridge Portal  Treatment/Session Summary:    · Response to Treatment: Good response to exercises today with no increase in pain. Limited Piriformis muscle length that improved after manual interventions. Dead bugs progressed to today well. Bird dogs irritate her L knee. · Communication/Consultation:  Equal WB with stance  · Equipment provided today:  None today  Recommendations/Intent for next treatment session: Next visit will focus on increase in reps/sets and weights/resistance as tolerated. Treatment Plan of Care Effective Dates: 3/15/19 TO 6/13/2019 (90 days).   Frequency/Duration: 2 times a week for 90 Days  Total Treatment Billable Duration:  35 mins  PT Patient Time In/Time Out  Time In: 1100  Time Out: 414 [facility]   Date Time Provider Curtis Aldridge   4/2/2019  4:15 PM Lilly kahlil [facility]   4/5/2019 10:15 AM Glennda Grounds N [facility]   4/9/2019  4:15 PM Glennda Grounds N [facility]   4/11/2019  8:55 AM MAT LAB [facility] MAT MAT   4/12/2019 11:00 AM Juli Min DPT [facility]   4/16/2019  4:15 PM Glennda Grounds N [facility]   4/18/2019 10:00 AM Magalie Amaya PA-C [facility]   4/19/2019 11:00 AM Glennda Grounds N [facility]   4/23/2019  4:15 PM Glennda Grounds N [facility]   4/26/2019 11:00 AM Lilly Sweet [facility]

## 2019-04-05 ENCOUNTER — APPOINTMENT (OUTPATIENT)
Dept: PHYSICAL THERAPY | Age: 59
End: 2019-04-05
Attending: NURSE PRACTITIONER

## 2019-04-09 ENCOUNTER — APPOINTMENT (OUTPATIENT)
Dept: PHYSICAL THERAPY | Age: 59
End: 2019-04-09
Attending: NURSE PRACTITIONER

## 2019-04-12 ENCOUNTER — APPOINTMENT (OUTPATIENT)
Dept: PHYSICAL THERAPY | Age: 59
End: 2019-04-12
Attending: NURSE PRACTITIONER

## 2019-04-16 ENCOUNTER — APPOINTMENT (OUTPATIENT)
Dept: PHYSICAL THERAPY | Age: 59
End: 2019-04-16
Attending: NURSE PRACTITIONER

## 2019-04-19 ENCOUNTER — APPOINTMENT (OUTPATIENT)
Dept: PHYSICAL THERAPY | Age: 59
End: 2019-04-19
Attending: NURSE PRACTITIONER

## 2019-04-23 ENCOUNTER — APPOINTMENT (OUTPATIENT)
Dept: PHYSICAL THERAPY | Age: 59
End: 2019-04-23
Attending: NURSE PRACTITIONER

## 2019-04-26 ENCOUNTER — APPOINTMENT (OUTPATIENT)
Dept: PHYSICAL THERAPY | Age: 59
End: 2019-04-26
Attending: NURSE PRACTITIONER

## 2019-06-12 NOTE — THERAPY DISCHARGE
Jenna Del Rosario  : 1960      Payor: Jay Cintron / Plan: Diane Skinner RPN / Product Type: Commerical /  [address] at [address] [place] [address]. [facility], [address] [place] [address], [address] [place] [address]  [phone]   [phone]       OUTPATIENT PHYSICAL THERAPY:Discontinuation Summary 2019    ICD-10: Treatment Diagnosis:    Low back pain (M54.5)        Muscle weakness (generalized) (M62.81)   Muscle spasm of back (Q04.702)           Precautions/Allergies:   Patient has no known allergies. Fall Risk Score: 5 (? 5 = High Risk)  MD Orders: Eval and Treat  MEDICAL/REFERRING DIAGNOSIS:  Spondylosis of lumbar region without myelopathy or radiculopathy   DATE OF ONSET: 9 years  REFERRING PHYSICIAN: Soledad Robles NP  RETURN PHYSICIAN APPOINTMENT: TBD by pt     Jenna Del Rosario has been seen in physical therapy from 3/15/19 to 3/29/19 for 3 visits. Treatment has been discontinued at this time due to patient failing to return for additional treatment. The below goals were met prior to discontinuation. Some goals were not met due to pt failing to return for more PT. Thank you for this referral.           INITIAL ASSESSMENT:  Ms. Jenna Del Rosario presents to physical therapy with decreased postural and hip/core strength, ROM, joint mobility, flexibility, functional mobility, and increased pain, and poor body mechanics with functional lifting. No pelvic malalignment upon initial evaluation but decreased posture. These S/S are consistent with low back pain, muscle weakness, muscle spasms of back. 1.  1.    TREATMENT PLAN:  Effective Dates: 3/15/19 TO 2019 (90 days). Frequency/Duration:1- 2 times a week for 90 Days  GOALS: (Goals have been discussed and agreed upon with patient.)  Short Term Goals 4 weeks   1. Jenna Del Rosario will be independent with HEP (meT)  2. Jenna Del Rosario will participate in LE stretching program to increase flexibility (met)  3.  Jenna Del Rosario will participate in core stabilization exercises to help with stabilization during ADLs (met)  4. Darian Rome will participate in LE strengthening program with weights/resistance as appropriate to help with gait and elevations  5. Darian Rome will participate in static and dynamic balance activities to decrease the risk for falls   6. Darian Rome will tolerate manual therapy/joint mobilizations/soft tissue to increase ROM and decrease pain (met)  7. Long Term Goals 8 weeks   1. Darian Rome will demonstrate a 10 point improvement on the Oswestry to show improvement in function  2. Darian Rome will report 0/10 pain at rest and during ADLs  3. Darian Rome will demonstrate 5/5 LE strength on manual muscle testing  4. Darian Rome will be able to perform SLS >30 seconds bilaterally to help with gait and improve balance  5. Darian Rome will be able to perform 10 squats to pick objects off the ground varying in weight from 5-10# and carry >10ft with appropriate form and min to no compensation/pain for reducing stress on lower back to improve her ability to  and carry animals at her job as a .             Talisha Tolentino PT DPT                      1.  1.  1.                  ·

## 2019-09-19 ENCOUNTER — HOSPITAL ENCOUNTER (OUTPATIENT)
Dept: PHYSICAL THERAPY | Age: 59
Discharge: HOME | End: 2019-09-19
Attending: PHYSICIAN ASSISTANT

## 2019-09-19 NOTE — PROGRESS NOTES
Ingris Garima  : 1960  Primary: Yury Goode Rpn  Secondary:  [facility] [place] [id number] [facility], [address] [place] [address],[address], [facility] 91.  [phone]   [phone]       Pt unable to attend scheduled appointment. Unable to pay deductible.     Jones Odom, PT, DPT

## 2019-10-25 ENCOUNTER — HOSPITAL ENCOUNTER (OUTPATIENT)
Dept: MAMMOGRAPHY | Age: 59
Discharge: HOME | End: 2019-10-25
Attending: PHYSICIAN ASSISTANT
Payer: COMMERCIAL

## 2019-10-25 DIAGNOSIS — M85.89 OSTEOPENIA OF MULTIPLE SITES: ICD-10-CM

## 2019-10-25 DIAGNOSIS — Z12.31 ENCOUNTER FOR SCREENING MAMMOGRAM FOR BREAST CANCER: ICD-10-CM

## 2019-10-25 DIAGNOSIS — R92.2 DENSE BREAST TISSUE: ICD-10-CM

## 2019-10-25 DIAGNOSIS — M89.9 BONE DISORDER: ICD-10-CM

## 2019-10-25 PROCEDURE — 77080 DXA BONE DENSITY AXIAL: CPT

## 2019-10-25 PROCEDURE — 77063 BREAST TOMOSYNTHESIS BI: CPT

## 2019-10-28 NOTE — PROGRESS NOTES
Bone density is still osteopenic in hips & lumbar spine. No serious change over the past 2 years other than slight worsening in the R hip. Until/unless she is interested in starting prescription medication for this I encourage her to get regular exercise and continue OTC calcium & vitamin d supplements. We will repeat in 2 years.

## 2020-08-07 ENCOUNTER — HOSPITAL ENCOUNTER (OUTPATIENT)
Dept: ULTRASOUND IMAGING | Age: 60
Discharge: HOME | End: 2020-08-07
Attending: PHYSICIAN ASSISTANT

## 2020-08-07 DIAGNOSIS — E06.3 HASHIMOTO'S DISEASE: ICD-10-CM

## 2020-08-07 DIAGNOSIS — E07.89 THYROID FULLNESS: ICD-10-CM

## 2020-08-07 NOTE — PROGRESS NOTES
Left lobe of thyroid is slightly bigger than the right lobe but still within normal range. There is a very small (6 mm) nodule on the L side that is not big enough for biopsy or concern.

## 2020-10-30 ENCOUNTER — HOSPITAL ENCOUNTER (OUTPATIENT)
Dept: MAMMOGRAPHY | Age: 60
Discharge: HOME | End: 2020-10-30
Attending: PHYSICIAN ASSISTANT
Payer: COMMERCIAL

## 2020-10-30 DIAGNOSIS — Z12.31 ENCOUNTER FOR SCREENING MAMMOGRAM FOR BREAST CANCER: ICD-10-CM

## 2020-10-30 PROCEDURE — 77063 BREAST TOMOSYNTHESIS BI: CPT

## 2020-11-02 NOTE — PROGRESS NOTES
Pt notified that mammogram is clear of any suspicious or concerning findings. Reminded to do monthly self breast exams. Plan to repeat imaging in 1 year. Pt understood.

## 2020-11-02 NOTE — PROGRESS NOTES
Mammogram is clear of any suspicious or concerning findings. Remind to do monthly self breast exams. Plan to repeat imaging in 1 year.

## 2021-02-26 ENCOUNTER — HOSPITAL ENCOUNTER (OUTPATIENT)
Dept: CT IMAGING | Age: 61
Discharge: HOME | End: 2021-02-26
Attending: INTERNAL MEDICINE

## 2021-02-26 DIAGNOSIS — I10 ESSENTIAL HYPERTENSION, BENIGN: ICD-10-CM

## 2021-02-26 DIAGNOSIS — E78.5 HYPERLIPIDEMIA WITH TARGET LOW DENSITY LIPOPROTEIN (LDL) CHOLESTEROL LESS THAN 130 MG/DL: ICD-10-CM

## 2021-03-13 ENCOUNTER — APPOINTMENT (OUTPATIENT)
Dept: GENERAL RADIOLOGY | Age: 61
End: 2021-03-13
Attending: EMERGENCY MEDICINE
Payer: COMMERCIAL

## 2021-03-13 ENCOUNTER — HOSPITAL ENCOUNTER (EMERGENCY)
Age: 61
Discharge: HOME | End: 2021-03-13
Attending: EMERGENCY MEDICINE
Payer: COMMERCIAL

## 2021-03-13 VITALS
HEIGHT: 62 IN | WEIGHT: 184 LBS | BODY MASS INDEX: 33.86 KG/M2 | HEART RATE: 98 BPM | SYSTOLIC BLOOD PRESSURE: 148 MMHG | OXYGEN SATURATION: 94 % | RESPIRATION RATE: 16 BRPM | DIASTOLIC BLOOD PRESSURE: 83 MMHG | TEMPERATURE: 98.4 F

## 2021-03-13 DIAGNOSIS — U07.1 COVID-19 VIRUS INFECTION: Primary | ICD-10-CM

## 2021-03-13 LAB
ALBUMIN SERPL-MCNC: 4.3 G/DL (ref 3.5–5)
ALBUMIN/GLOB SERPL: 1.2 {RATIO} (ref 1.2–3.5)
ALP SERPL-CCNC: 95 U/L (ref 50–136)
ALT SERPL-CCNC: 64 U/L (ref 12–65)
ANION GAP SERPL CALC-SCNC: 7 MMOL/L (ref 7–16)
AST SERPL-CCNC: 42 U/L (ref 15–37)
BASOPHILS # BLD: 0 K/UL (ref 0–0.2)
BASOPHILS NFR BLD: 0 % (ref 0–2)
BILIRUB SERPL-MCNC: 0.4 MG/DL (ref 0.2–1.1)
BUN SERPL-MCNC: 16 MG/DL (ref 6–23)
CALCIUM SERPL-MCNC: 9.5 MG/DL (ref 8.3–10.4)
CHLORIDE SERPL-SCNC: 103 MMOL/L (ref 98–107)
CO2 SERPL-SCNC: 26 MMOL/L (ref 21–32)
CREAT SERPL-MCNC: 0.69 MG/DL (ref 0.6–1)
DIFFERENTIAL METHOD BLD: ABNORMAL
EOSINOPHIL # BLD: 0 K/UL (ref 0–0.8)
EOSINOPHIL NFR BLD: 0 % (ref 0.5–7.8)
ERYTHROCYTE [DISTWIDTH] IN BLOOD BY AUTOMATED COUNT: 12.6 % (ref 11.9–14.6)
GLOBULIN SER CALC-MCNC: 3.7 G/DL (ref 2.3–3.5)
GLUCOSE SERPL-MCNC: 106 MG/DL (ref 65–100)
HCT VFR BLD AUTO: 40.4 % (ref 35.8–46.3)
HGB BLD-MCNC: 13 G/DL (ref 11.7–15.4)
IMM GRANULOCYTES # BLD AUTO: 0 K/UL (ref 0–0.5)
IMM GRANULOCYTES NFR BLD AUTO: 1 % (ref 0–5)
LIPASE SERPL-CCNC: 98 U/L (ref 73–393)
LYMPHOCYTES # BLD: 0.4 K/UL (ref 0.5–4.6)
LYMPHOCYTES NFR BLD: 5 % (ref 13–44)
MAGNESIUM SERPL-MCNC: 2.1 MG/DL (ref 1.8–2.4)
MCH RBC QN AUTO: 28.9 PG (ref 26.1–32.9)
MCHC RBC AUTO-ENTMCNC: 32.2 G/DL (ref 31.4–35)
MCV RBC AUTO: 89.8 FL (ref 79.6–97.8)
MONOCYTES # BLD: 0.7 K/UL (ref 0.1–1.3)
MONOCYTES NFR BLD: 10 % (ref 4–12)
NEUTS SEG # BLD: 5.6 K/UL (ref 1.7–8.2)
NEUTS SEG NFR BLD: 84 % (ref 43–78)
NRBC # BLD: 0 K/UL (ref 0–0.2)
PLATELET # BLD AUTO: 181 K/UL (ref 150–450)
PMV BLD AUTO: 9.2 FL (ref 9.4–12.3)
POTASSIUM SERPL-SCNC: 3.5 MMOL/L (ref 3.5–5.1)
PROT SERPL-MCNC: 8 G/DL (ref 6.3–8.2)
RBC # BLD AUTO: 4.5 M/UL (ref 4.05–5.2)
SODIUM SERPL-SCNC: 136 MMOL/L (ref 136–145)
WBC # BLD AUTO: 6.7 K/UL (ref 4.3–11.1)

## 2021-03-13 PROCEDURE — 85025 COMPLETE CBC W/AUTO DIFF WBC: CPT

## 2021-03-13 PROCEDURE — 99283 EMERGENCY DEPT VISIT LOW MDM: CPT

## 2021-03-13 PROCEDURE — 74011250636 HC RX REV CODE- 250/636: Performed by: EMERGENCY MEDICINE

## 2021-03-13 PROCEDURE — 71045 X-RAY EXAM CHEST 1 VIEW: CPT

## 2021-03-13 PROCEDURE — 83735 ASSAY OF MAGNESIUM: CPT

## 2021-03-13 PROCEDURE — 80053 COMPREHEN METABOLIC PANEL: CPT

## 2021-03-13 PROCEDURE — 96374 THER/PROPH/DIAG INJ IV PUSH: CPT

## 2021-03-13 PROCEDURE — 83690 ASSAY OF LIPASE: CPT

## 2021-03-13 RX ORDER — KETOROLAC TROMETHAMINE 30 MG/ML
30 INJECTION, SOLUTION INTRAMUSCULAR; INTRAVENOUS ONCE
Status: COMPLETED | OUTPATIENT
Start: 2021-03-13 | End: 2021-03-13

## 2021-03-13 RX ORDER — ALBUTEROL SULFATE 90 UG/1
2 AEROSOL, METERED RESPIRATORY (INHALATION)
Qty: 1 INHALER | Refills: 0 | Status: SHIPPED | OUTPATIENT
Start: 2021-03-13 | End: 2021-04-01 | Stop reason: SDUPTHER

## 2021-03-13 RX ADMIN — SODIUM CHLORIDE 1000 ML: 900 INJECTION, SOLUTION INTRAVENOUS at 12:45

## 2021-03-13 RX ADMIN — KETOROLAC TROMETHAMINE 30 MG: 30 INJECTION, SOLUTION INTRAMUSCULAR at 12:45

## 2021-03-13 NOTE — ED PROVIDER NOTES
52-year-old female with no significant medical history presenting for flulike syndrome in the setting of COVID-19 infection. The history is provided by the patient.    Positive For Covid-19  Max temp prior to arrival:  101  Temp source:  Oral  Severity:  Moderate  Onset quality:  Gradual  Duration:  2 days  Timing:  Constant  Progression:  Worsening  Chronicity:  New  Relieved by:  Nothing  Worsened by:  Nothing  Ineffective treatments:  None tried  Associated symptoms: chills, cough, headaches and myalgias    Associated symptoms: no chest pain, no confusion, no congestion, no diarrhea, no dysuria, no ear pain, no nausea, no rash, no rhinorrhea, no somnolence, no sore throat and no vomiting    Risk factors: no contaminated food, no contaminated water, no hx of cancer, no immunosuppression, no occupational exposure, no recent sickness, no recent travel and no sick contacts         Past Medical History:   Diagnosis Date    Acute medial meniscal tear 12/28/2018    Left    Hashimoto's disease     Hemorrhoids 08/28/2017    Internal + External per Colonoscopy    Hyperlipidemia     Hypertension     Hypothyroidism, acquired, autoimmune     Lumbar degenerative disc disease 09/20/2019    Mild Degeneration & Moderate Disc Bulges L3-S1 with Moderate Bilateral L>R Foramen Narrowings    Migraine headache     Osteoarthritis     R Toe, L Hand, Bilateral Knees    Osteopenia 4/23/2015    Spondylosis of lumbar region without myelopathy or radiculopathy 05/03/2010    Trigger ring finger     Right Middle    Vitamin B12 deficiency 12/16/2013    Vitamin D deficiency 12/16/2013       Past Surgical History:   Procedure Laterality Date    HX APPENDECTOMY      HX COLONOSCOPY  08/28/2017    Due 2027    HX KNEE ARTHROSCOPY Right 2009    HX OVARIAN CYST REMOVAL Right     HX TONSILLECTOMY           Family History:   Problem Relation Age of Onset    Cancer Mother         Lung    Hypertension Mother     Diabetes Mother    Mariel Her Hypertension Father     Heart Disease Father     Hypertension Brother     Breast Cancer Neg Hx        Social History     Socioeconomic History    Marital status: SINGLE     Spouse name: Not on file    Number of children: Not on file    Years of education: Not on file    Highest education level: Not on file   Occupational History    Not on file   Social Needs    Financial resource strain: Not on file    Food insecurity     Worry: Not on file     Inability: Not on file    Transportation needs     Medical: Not on file     Non-medical: Not on file   Tobacco Use    Smoking status: Never Smoker    Smokeless tobacco: Never Used   Substance and Sexual Activity    Alcohol use: Yes     Alcohol/week: 3.0 - 4.0 standard drinks     Types: 3 - 4 Glasses of wine per week    Drug use: No    Sexual activity: Not Currently   Lifestyle    Physical activity     Days per week: Not on file     Minutes per session: Not on file    Stress: Not on file   Relationships    Social connections     Talks on phone: Not on file     Gets together: Not on file     Attends Yazidi service: Not on file     Active member of club or organization: Not on file     Attends meetings of clubs or organizations: Not on file     Relationship status: Not on file    Intimate partner violence     Fear of current or ex partner: Not on file     Emotionally abused: Not on file     Physically abused: Not on file     Forced sexual activity: Not on file   Other Topics Concern    Not on file   Social History Narrative    Not on file         ALLERGIES: Patient has no known allergies. Review of Systems   Constitutional: Positive for chills. HENT: Negative for congestion, ear pain, rhinorrhea and sore throat. Respiratory: Positive for cough. Cardiovascular: Negative for chest pain. Gastrointestinal: Negative for diarrhea, nausea and vomiting. Genitourinary: Negative for dysuria. Musculoskeletal: Positive for myalgias.    Skin: Negative for rash. Neurological: Positive for headaches. Psychiatric/Behavioral: Negative for confusion. All other systems reviewed and are negative. Vitals:    03/13/21 1211   BP: (!) 172/99   Pulse: (!) 112   Resp: 16   Temp: 98.4 °F (36.9 °C)   SpO2: 95%   Weight: 83.5 kg (184 lb)   Height: 5' 2\" (1.575 m)            Physical Exam  Vitals signs and nursing note reviewed. Constitutional:       Appearance: She is well-developed. She is obese. Comments: Tearful   HENT:      Head: Normocephalic and atraumatic. Eyes:      Conjunctiva/sclera: Conjunctivae normal.      Pupils: Pupils are equal, round, and reactive to light. Neck:      Musculoskeletal: Neck supple. Cardiovascular:      Rate and Rhythm: Regular rhythm. Tachycardia present. Pulmonary:      Effort: Pulmonary effort is normal.      Breath sounds: Normal breath sounds. Abdominal:      General: Bowel sounds are normal.      Palpations: Abdomen is soft. Musculoskeletal: Normal range of motion. Skin:     General: Skin is warm and dry. Neurological:      Mental Status: She is alert and oriented to person, place, and time. MDM  Number of Diagnoses or Management Options  COVID-19 virus infection  Diagnosis management comments: 16-year-old female with no significant medical history presenting for COVID-19 infection.   O2 sats are 95-96% on room air but she is somewhat tachycardic       Amount and/or Complexity of Data Reviewed  Clinical lab tests: ordered and reviewed (Results for orders placed or performed during the hospital encounter of 03/13/21  -CBC WITH AUTOMATED DIFF       Result                      Value             Ref Range           WBC                         6.7               4.3 - 11.1 K*       RBC                         4.50              4.05 - 5.2 M*       HGB                         13.0              11.7 - 15.4 *       HCT                         40.4              35.8 - 46.3 %       MCV 89.8              79.6 - 97.8 *       MCH                         28.9              26.1 - 32.9 *       MCHC                        32.2              31.4 - 35.0 *       RDW                         12.6              11.9 - 14.6 %       PLATELET                    181               150 - 450 K/*       MPV                         9.2 (L)           9.4 - 12.3 FL       ABSOLUTE NRBC               0.00              0.0 - 0.2 K/*       DF                          AUTOMATED                             NEUTROPHILS                 84 (H)            43 - 78 %           LYMPHOCYTES                 5 (L)             13 - 44 %           MONOCYTES                   10                4.0 - 12.0 %        EOSINOPHILS                 0 (L)             0.5 - 7.8 %         BASOPHILS                   0                 0.0 - 2.0 %         IMMATURE GRANULOCYTES       1                 0.0 - 5.0 %         ABS. NEUTROPHILS            5.6               1.7 - 8.2 K/*       ABS. LYMPHOCYTES            0.4 (L)           0.5 - 4.6 K/*       ABS. MONOCYTES              0.7               0.1 - 1.3 K/*       ABS. EOSINOPHILS            0.0               0.0 - 0.8 K/*       ABS. BASOPHILS              0.0               0.0 - 0.2 K/*       ABS. IMM.  GRANS.            0.0               0.0 - 0.5 K/*  -LIPASE       Result                      Value             Ref Range           Lipase                      98                73 - 807 U/L   -METABOLIC PANEL, COMPREHENSIVE       Result                      Value             Ref Range           Sodium                      136               136 - 145 mm*       Potassium                   3.5               3.5 - 5.1 mm*       Chloride                    103               98 - 107 mmo*       CO2                         26                21 - 32 mmol*       Anion gap                   7                 7 - 16 mmol/L       Glucose                     106 (H)           65 - 100 mg/*       BUN 16                6 - 23 MG/DL        Creatinine                  0.69              0.6 - 1.0 MG*       GFR est AA                  >60               >60 ml/min/1*       GFR est non-AA              >60               >60 ml/min/1*       Calcium                     9.5               8.3 - 10.4 M*       Bilirubin, total            0.4               0.2 - 1.1 MG*       ALT (SGPT)                  64                12 - 65 U/L         AST (SGOT)                  42 (H)            15 - 37 U/L         Alk. phosphatase            95                50 - 136 U/L        Protein, total              8.0               6.3 - 8.2 g/*       Albumin                     4.3               3.5 - 5.0 g/*       Globulin                    3.7 (H)           2.3 - 3.5 g/*       A-G Ratio                   1.2               1.2 - 3.5      -MAGNESIUM       Result                      Value             Ref Range           Magnesium                   2.1               1.8 - 2.4 mg*  )  Tests in the radiology section of CPT®: ordered and reviewed (Ct Heart W/o Cont With Calcium    Result Date: 2/26/2021  CT calcium scoring study INDICATION:  No essential hypertension, hyperlipidemia Multiple gated axial images were obtained through the chest.  Radiation dose reduction techniques were used for this study: All CT scans performed at this facility use one or all of the following: Automated exposure control, adjustment of the mA and/or kVp according to patient's size, iterative reconstruction. FINDINGS: The calcium score is 272 (90th percentile). The visualized portions of the lungs are clear. There are no masses or infiltrates. There is no significant adenopathy. Multilevel degenerative changes of the spine. The calcium score is 272 corresponding with a high risk of cardiovascular disease. Xr Chest Port    Result Date: 3/13/2021  EXAM: Single view chest radiograph. INDICATION: Covid 19 positive. COMPARISON: None.  FINDINGS: No focal lung consolidation. No pneumothorax. No pleural effusion. The heart is normal in size. No evidence of acute osseous abnormality. 1. No acute cardiopulmonary abnormality.     )  Tests in the medicine section of CPT®: ordered and reviewed  Independent visualization of images, tracings, or specimens: yes    Risk of Complications, Morbidity, and/or Mortality  Presenting problems: high  Diagnostic procedures: high  Management options: moderate  General comments: Patient's COVID-19 risk of decompensation score is 2    Work-up has been otherwise unremarkable and she seems to be feeling better. I personally reviewed the patient's vital signs, laboratory tests, and/or radiological findings. I discussed these findings with the patient and their significance. I answered all questions and gave the patient clear return precautions. The patient was discharged from the emergency department in stable condition        Patient Progress  Patient progress: improved    ED Course as of Mar 13 1359   Sat Mar 13, 2021   1321 Reviewed patient's chest x-ray which appears clear. [JS]   5357 Patient ambulating with ease. We will check a pulse oximeter while ambulating and if remains above 92 will be discharged home. [JS]   6725 Patient remained 95% on room air while ambulating.     [JS]      ED Course User Index  [JS] Tom Ham MD       Procedures

## 2021-03-13 NOTE — ED NOTES
I have reviewed discharge instructions with the patient. The patient verbalized understanding. Patient left ED via Discharge Method: ambulatory to Home with self. Opportunity for questions and clarification provided. Patient given 1 scripts. To continue your aftercare when you leave the hospital, you may receive an automated call from our care team to check in on how you are doing. This is a free service and part of our promise to provide the best care and service to meet your aftercare needs.  If you have questions, or wish to unsubscribe from this service please call [phone]. Thank you for Choosing our [address] [place] [address] Emergency Department.

## 2021-03-13 NOTE — ED TRIAGE NOTES
Patient ambulatory to triage with mask in place. Patient reports test positive of COVID this morning. Pt reports body aches and cough.

## 2021-03-13 NOTE — DISCHARGE INSTRUCTIONS
Monitor your oxygen at home. You will likely have worsening fatigue over the next week. Tylenol and Motrin in alternating doses for fever and muscle ache control. Drink plenty of fluids. Use the albuterol inhaler for shortness of breath. Return to the ER if your O2 sats dropped below 98 for a consistent basis.

## 2021-03-17 ENCOUNTER — APPOINTMENT (OUTPATIENT)
Dept: GENERAL RADIOLOGY | Age: 61
DRG: 177 | End: 2021-03-17
Attending: EMERGENCY MEDICINE
Payer: COMMERCIAL

## 2021-03-17 ENCOUNTER — HOSPITAL ENCOUNTER (INPATIENT)
Age: 61
LOS: 10 days | Discharge: HOME HEALTH | DRG: 177 | End: 2021-03-28
Attending: EMERGENCY MEDICINE | Admitting: HOSPITALIST
Payer: COMMERCIAL

## 2021-03-17 DIAGNOSIS — J96.01 ACUTE HYPOXEMIC RESPIRATORY FAILURE DUE TO COVID-19 (HCC): Primary | ICD-10-CM

## 2021-03-17 DIAGNOSIS — U07.1 ACUTE HYPOXEMIC RESPIRATORY FAILURE DUE TO COVID-19 (HCC): Primary | ICD-10-CM

## 2021-03-17 DIAGNOSIS — E86.0 DEHYDRATION: ICD-10-CM

## 2021-03-17 LAB
ABO + RH BLD: NORMAL
ALBUMIN SERPL-MCNC: 3.9 G/DL (ref 3.5–5)
ALBUMIN/GLOB SERPL: 0.9 {RATIO} (ref 1.2–3.5)
ALP SERPL-CCNC: 98 U/L (ref 50–136)
ALT SERPL-CCNC: 71 U/L (ref 12–65)
ANION GAP SERPL CALC-SCNC: 9 MMOL/L (ref 7–16)
AST SERPL-CCNC: 72 U/L (ref 15–37)
BASOPHILS # BLD: 0 K/UL (ref 0–0.2)
BASOPHILS NFR BLD: 0 % (ref 0–2)
BILIRUB SERPL-MCNC: 0.4 MG/DL (ref 0.2–1.1)
BLOOD GROUP ANTIBODIES SERPL: NORMAL
BUN SERPL-MCNC: 27 MG/DL (ref 6–23)
CALCIUM SERPL-MCNC: 9 MG/DL (ref 8.3–10.4)
CHLORIDE SERPL-SCNC: 97 MMOL/L (ref 98–107)
CO2 SERPL-SCNC: 28 MMOL/L (ref 21–32)
CREAT SERPL-MCNC: 1.04 MG/DL (ref 0.6–1)
DIFFERENTIAL METHOD BLD: ABNORMAL
EOSINOPHIL # BLD: 0 K/UL (ref 0–0.8)
EOSINOPHIL NFR BLD: 0 % (ref 0.5–7.8)
ERYTHROCYTE [DISTWIDTH] IN BLOOD BY AUTOMATED COUNT: 12.9 % (ref 11.9–14.6)
GLOBULIN SER CALC-MCNC: 4.5 G/DL (ref 2.3–3.5)
GLUCOSE SERPL-MCNC: 103 MG/DL (ref 65–100)
HCT VFR BLD AUTO: 42.5 % (ref 35.8–46.3)
HGB BLD-MCNC: 13.8 G/DL (ref 11.7–15.4)
IMM GRANULOCYTES # BLD AUTO: 0.1 K/UL (ref 0–0.5)
IMM GRANULOCYTES NFR BLD AUTO: 1 % (ref 0–5)
LACTATE SERPL-SCNC: 1.2 MMOL/L (ref 0.4–2)
LYMPHOCYTES # BLD: 0.5 K/UL (ref 0.5–4.6)
LYMPHOCYTES NFR BLD: 12 % (ref 13–44)
MAGNESIUM SERPL-MCNC: 2.5 MG/DL (ref 1.8–2.4)
MCH RBC QN AUTO: 28.9 PG (ref 26.1–32.9)
MCHC RBC AUTO-ENTMCNC: 32.5 G/DL (ref 31.4–35)
MCV RBC AUTO: 88.9 FL (ref 79.6–97.8)
MONOCYTES # BLD: 0.4 K/UL (ref 0.1–1.3)
MONOCYTES NFR BLD: 9 % (ref 4–12)
NEUTS SEG # BLD: 2.9 K/UL (ref 1.7–8.2)
NEUTS SEG NFR BLD: 77 % (ref 43–78)
NRBC # BLD: 0 K/UL (ref 0–0.2)
PLATELET # BLD AUTO: 222 K/UL (ref 150–450)
PMV BLD AUTO: 9.4 FL (ref 9.4–12.3)
POTASSIUM SERPL-SCNC: 4.2 MMOL/L (ref 3.5–5.1)
PROT SERPL-MCNC: 8.4 G/DL (ref 6.3–8.2)
RBC # BLD AUTO: 4.78 M/UL (ref 4.05–5.2)
SODIUM SERPL-SCNC: 134 MMOL/L (ref 136–145)
SPECIMEN EXP DATE BLD: NORMAL
WBC # BLD AUTO: 3.8 K/UL (ref 4.3–11.1)

## 2021-03-17 PROCEDURE — 93005 ELECTROCARDIOGRAM TRACING: CPT | Performed by: EMERGENCY MEDICINE

## 2021-03-17 PROCEDURE — 84145 PROCALCITONIN (PCT): CPT

## 2021-03-17 PROCEDURE — 83605 ASSAY OF LACTIC ACID: CPT

## 2021-03-17 PROCEDURE — 85025 COMPLETE CBC W/AUTO DIFF WBC: CPT

## 2021-03-17 PROCEDURE — 80053 COMPREHEN METABOLIC PANEL: CPT

## 2021-03-17 PROCEDURE — 83735 ASSAY OF MAGNESIUM: CPT

## 2021-03-17 PROCEDURE — 99285 EMERGENCY DEPT VISIT HI MDM: CPT

## 2021-03-17 PROCEDURE — 86900 BLOOD TYPING SEROLOGIC ABO: CPT

## 2021-03-17 PROCEDURE — 71045 X-RAY EXAM CHEST 1 VIEW: CPT

## 2021-03-17 PROCEDURE — 87040 BLOOD CULTURE FOR BACTERIA: CPT

## 2021-03-18 ENCOUNTER — APPOINTMENT (OUTPATIENT)
Dept: CT IMAGING | Age: 61
DRG: 177 | End: 2021-03-18
Attending: HOSPITALIST
Payer: COMMERCIAL

## 2021-03-18 PROBLEM — J96.00 ACUTE RESPIRATORY FAILURE DUE TO COVID-19 (HCC): Status: ACTIVE | Noted: 2021-03-18

## 2021-03-18 PROBLEM — U07.1 COVID-19: Status: ACTIVE | Noted: 2021-03-18

## 2021-03-18 LAB
ATRIAL RATE: 82 BPM
CALCULATED P AXIS, ECG09: 36 DEGREES
CALCULATED R AXIS, ECG10: 0 DEGREES
CALCULATED T AXIS, ECG11: 59 DEGREES
DIAGNOSIS, 93000: NORMAL
LACTATE SERPL-SCNC: 0.8 MMOL/L (ref 0.4–2)
P-R INTERVAL, ECG05: 150 MS
PROCALCITONIN SERPL-MCNC: 0.08 NG/ML
PROCALCITONIN SERPL-MCNC: 0.13 NG/ML
Q-T INTERVAL, ECG07: 346 MS
QRS DURATION, ECG06: 70 MS
QTC CALCULATION (BEZET), ECG08: 404 MS
SARS-COV-2, COV2: DETECTED
SARS-COV-2, COV2: NORMAL
SPECIMEN SOURCE, FCOV2M: ABNORMAL
VENTRICULAR RATE, ECG03: 82 BPM

## 2021-03-18 PROCEDURE — XW13325 TRANSFUSION OF CONVALESCENT PLASMA (NONAUTOLOGOUS) INTO PERIPHERAL VEIN, PERCUTANEOUS APPROACH, NEW TECHNOLOGY GROUP 5: ICD-10-PCS | Performed by: HOSPITALIST

## 2021-03-18 PROCEDURE — 94640 AIRWAY INHALATION TREATMENT: CPT

## 2021-03-18 PROCEDURE — 77010033678 HC OXYGEN DAILY

## 2021-03-18 PROCEDURE — U0005 INFEC AGEN DETEC AMPLI PROBE: HCPCS

## 2021-03-18 PROCEDURE — 77030027138 HC INCENT SPIROMETER -A

## 2021-03-18 PROCEDURE — 74011250636 HC RX REV CODE- 250/636: Performed by: EMERGENCY MEDICINE

## 2021-03-18 PROCEDURE — 94760 N-INVAS EAR/PLS OXIMETRY 1: CPT

## 2021-03-18 PROCEDURE — 74011250636 HC RX REV CODE- 250/636: Performed by: HOSPITALIST

## 2021-03-18 PROCEDURE — 84145 PROCALCITONIN (PCT): CPT

## 2021-03-18 PROCEDURE — 97535 SELF CARE MNGMENT TRAINING: CPT

## 2021-03-18 PROCEDURE — 74011250636 HC RX REV CODE- 250/636: Performed by: PHYSICIAN ASSISTANT

## 2021-03-18 PROCEDURE — 74011250637 HC RX REV CODE- 250/637: Performed by: HOSPITALIST

## 2021-03-18 PROCEDURE — 71250 CT THORAX DX C-: CPT

## 2021-03-18 PROCEDURE — 94762 N-INVAS EAR/PLS OXIMTRY CONT: CPT

## 2021-03-18 PROCEDURE — 83605 ASSAY OF LACTIC ACID: CPT

## 2021-03-18 PROCEDURE — XW033E5 INTRODUCTION OF REMDESIVIR ANTI-INFECTIVE INTO PERIPHERAL VEIN, PERCUTANEOUS APPROACH, NEW TECHNOLOGY GROUP 5: ICD-10-PCS | Performed by: HOSPITALIST

## 2021-03-18 PROCEDURE — 96374 THER/PROPH/DIAG INJ IV PUSH: CPT

## 2021-03-18 PROCEDURE — 74011000250 HC RX REV CODE- 250: Performed by: HOSPITALIST

## 2021-03-18 PROCEDURE — 74011000258 HC RX REV CODE- 258: Performed by: HOSPITALIST

## 2021-03-18 PROCEDURE — 97165 OT EVAL LOW COMPLEX 30 MIN: CPT

## 2021-03-18 PROCEDURE — 2709999900 HC NON-CHARGEABLE SUPPLY

## 2021-03-18 PROCEDURE — 36430 TRANSFUSION BLD/BLD COMPNT: CPT

## 2021-03-18 PROCEDURE — 65270000029 HC RM PRIVATE

## 2021-03-18 RX ORDER — SODIUM CHLORIDE 9 MG/ML
250 INJECTION, SOLUTION INTRAVENOUS AS NEEDED
Status: DISCONTINUED | OUTPATIENT
Start: 2021-03-18 | End: 2021-03-28 | Stop reason: HOSPADM

## 2021-03-18 RX ORDER — SODIUM CHLORIDE 0.9 % (FLUSH) 0.9 %
5-40 SYRINGE (ML) INJECTION AS NEEDED
Status: DISCONTINUED | OUTPATIENT
Start: 2021-03-18 | End: 2021-03-28 | Stop reason: HOSPADM

## 2021-03-18 RX ORDER — SODIUM CHLORIDE 9 MG/ML
100 INJECTION, SOLUTION INTRAVENOUS CONTINUOUS
Status: DISCONTINUED | OUTPATIENT
Start: 2021-03-18 | End: 2021-03-19

## 2021-03-18 RX ORDER — ASPIRIN 81 MG/1
81 TABLET ORAL DAILY
Status: DISCONTINUED | OUTPATIENT
Start: 2021-03-18 | End: 2021-03-20

## 2021-03-18 RX ORDER — ERGOCALCIFEROL 1.25 MG/1
50000 CAPSULE ORAL
Status: DISCONTINUED | OUTPATIENT
Start: 2021-03-18 | End: 2021-03-18

## 2021-03-18 RX ORDER — AZITHROMYCIN 250 MG/1
250 TABLET, FILM COATED ORAL DAILY
Status: COMPLETED | OUTPATIENT
Start: 2021-03-18 | End: 2021-03-22

## 2021-03-18 RX ORDER — SODIUM CHLORIDE, SODIUM LACTATE, POTASSIUM CHLORIDE, CALCIUM CHLORIDE 600; 310; 30; 20 MG/100ML; MG/100ML; MG/100ML; MG/100ML
75 INJECTION, SOLUTION INTRAVENOUS CONTINUOUS
Status: DISCONTINUED | OUTPATIENT
Start: 2021-03-18 | End: 2021-03-18

## 2021-03-18 RX ORDER — POLYETHYLENE GLYCOL 3350 17 G/17G
17 POWDER, FOR SOLUTION ORAL DAILY
Status: DISCONTINUED | OUTPATIENT
Start: 2021-03-18 | End: 2021-03-28 | Stop reason: HOSPADM

## 2021-03-18 RX ORDER — LEVOTHYROXINE SODIUM 50 UG/1
50 TABLET ORAL
Status: DISCONTINUED | OUTPATIENT
Start: 2021-03-18 | End: 2021-03-28 | Stop reason: HOSPADM

## 2021-03-18 RX ORDER — BACLOFEN 10 MG/1
5 TABLET ORAL
Status: DISCONTINUED | OUTPATIENT
Start: 2021-03-18 | End: 2021-03-28 | Stop reason: HOSPADM

## 2021-03-18 RX ORDER — SENNOSIDES 8.6 MG/1
1 TABLET ORAL DAILY PRN
Status: DISCONTINUED | OUTPATIENT
Start: 2021-03-18 | End: 2021-03-28 | Stop reason: HOSPADM

## 2021-03-18 RX ORDER — ENOXAPARIN SODIUM 100 MG/ML
40 INJECTION SUBCUTANEOUS EVERY 12 HOURS
Status: DISCONTINUED | OUTPATIENT
Start: 2021-03-18 | End: 2021-03-28 | Stop reason: HOSPADM

## 2021-03-18 RX ORDER — ALBUTEROL SULFATE 90 UG/1
2 AEROSOL, METERED RESPIRATORY (INHALATION)
Status: DISCONTINUED | OUTPATIENT
Start: 2021-03-18 | End: 2021-03-18

## 2021-03-18 RX ORDER — SODIUM CHLORIDE 9 MG/ML
100 INJECTION, SOLUTION INTRAVENOUS ONCE
Status: COMPLETED | OUTPATIENT
Start: 2021-03-18 | End: 2021-03-18

## 2021-03-18 RX ORDER — ALBUTEROL SULFATE 90 UG/1
2 AEROSOL, METERED RESPIRATORY (INHALATION)
Status: DISCONTINUED | OUTPATIENT
Start: 2021-03-18 | End: 2021-03-20

## 2021-03-18 RX ORDER — FACIAL-BODY WIPES
10 EACH TOPICAL DAILY PRN
Status: DISCONTINUED | OUTPATIENT
Start: 2021-03-18 | End: 2021-03-28 | Stop reason: HOSPADM

## 2021-03-18 RX ORDER — ONDANSETRON 2 MG/ML
4 INJECTION INTRAMUSCULAR; INTRAVENOUS
Status: DISCONTINUED | OUTPATIENT
Start: 2021-03-18 | End: 2021-03-28 | Stop reason: HOSPADM

## 2021-03-18 RX ORDER — ACETAMINOPHEN 325 MG/1
650 TABLET ORAL
Status: DISCONTINUED | OUTPATIENT
Start: 2021-03-18 | End: 2021-03-28 | Stop reason: HOSPADM

## 2021-03-18 RX ORDER — CHOLECALCIFEROL TAB 125 MCG (5000 UNIT) 125 MCG
5000 TAB ORAL DAILY
Status: DISCONTINUED | OUTPATIENT
Start: 2021-03-18 | End: 2021-03-28 | Stop reason: HOSPADM

## 2021-03-18 RX ORDER — FAMOTIDINE 20 MG/1
20 TABLET, FILM COATED ORAL 2 TIMES DAILY
Status: DISCONTINUED | OUTPATIENT
Start: 2021-03-18 | End: 2021-03-28 | Stop reason: HOSPADM

## 2021-03-18 RX ORDER — ROSUVASTATIN CALCIUM 20 MG/1
20 TABLET, COATED ORAL
Status: DISCONTINUED | OUTPATIENT
Start: 2021-03-18 | End: 2021-03-28 | Stop reason: HOSPADM

## 2021-03-18 RX ORDER — MELOXICAM 7.5 MG/1
7.5 TABLET ORAL 2 TIMES DAILY WITH MEALS
Status: DISCONTINUED | OUTPATIENT
Start: 2021-03-18 | End: 2021-03-19

## 2021-03-18 RX ORDER — LISINOPRIL 20 MG/1
20 TABLET ORAL DAILY
Status: DISCONTINUED | OUTPATIENT
Start: 2021-03-18 | End: 2021-03-24

## 2021-03-18 RX ORDER — SODIUM CHLORIDE 0.9 % (FLUSH) 0.9 %
5-40 SYRINGE (ML) INJECTION EVERY 8 HOURS
Status: DISCONTINUED | OUTPATIENT
Start: 2021-03-18 | End: 2021-03-28 | Stop reason: HOSPADM

## 2021-03-18 RX ORDER — DEXAMETHASONE SODIUM PHOSPHATE 100 MG/10ML
10 INJECTION INTRAMUSCULAR; INTRAVENOUS
Status: COMPLETED | OUTPATIENT
Start: 2021-03-18 | End: 2021-03-18

## 2021-03-18 RX ORDER — GUAIFENESIN/DEXTROMETHORPHAN 100-10MG/5
5 SYRUP ORAL
Status: DISCONTINUED | OUTPATIENT
Start: 2021-03-18 | End: 2021-03-28 | Stop reason: HOSPADM

## 2021-03-18 RX ORDER — ACETAMINOPHEN 650 MG/1
650 SUPPOSITORY RECTAL
Status: DISCONTINUED | OUTPATIENT
Start: 2021-03-18 | End: 2021-03-28 | Stop reason: HOSPADM

## 2021-03-18 RX ADMIN — LEVOTHYROXINE SODIUM 50 MCG: 0.05 TABLET ORAL at 07:38

## 2021-03-18 RX ADMIN — SODIUM CHLORIDE 100 ML/HR: 900 INJECTION, SOLUTION INTRAVENOUS at 21:59

## 2021-03-18 RX ADMIN — ENOXAPARIN SODIUM 40 MG: 40 INJECTION SUBCUTANEOUS at 21:41

## 2021-03-18 RX ADMIN — ROSUVASTATIN CALCIUM 20 MG: 20 TABLET, COATED ORAL at 01:29

## 2021-03-18 RX ADMIN — ALBUTEROL SULFATE 2 PUFF: 90 AEROSOL, METERED RESPIRATORY (INHALATION) at 21:18

## 2021-03-18 RX ADMIN — AZITHROMYCIN MONOHYDRATE 250 MG: 250 TABLET ORAL at 09:14

## 2021-03-18 RX ADMIN — CHOLECALCIFEROL TAB 125 MCG (5000 UNIT) 5000 UNITS: 125 TAB at 09:14

## 2021-03-18 RX ADMIN — Medication 10 ML: at 14:25

## 2021-03-18 RX ADMIN — SODIUM CHLORIDE, SODIUM LACTATE, POTASSIUM CHLORIDE, AND CALCIUM CHLORIDE 75 ML/HR: 600; 310; 30; 20 INJECTION, SOLUTION INTRAVENOUS at 15:33

## 2021-03-18 RX ADMIN — ALBUTEROL SULFATE 2 PUFF: 90 AEROSOL, METERED RESPIRATORY (INHALATION) at 13:10

## 2021-03-18 RX ADMIN — MELOXICAM 7.5 MG: 7.5 TABLET ORAL at 07:38

## 2021-03-18 RX ADMIN — POLYETHYLENE GLYCOL 3350 17 G: 17 POWDER, FOR SOLUTION ORAL at 09:15

## 2021-03-18 RX ADMIN — ONDANSETRON 4 MG: 2 INJECTION INTRAMUSCULAR; INTRAVENOUS at 02:17

## 2021-03-18 RX ADMIN — MELOXICAM 7.5 MG: 7.5 TABLET ORAL at 17:08

## 2021-03-18 RX ADMIN — FAMOTIDINE 20 MG: 20 TABLET, FILM COATED ORAL at 09:15

## 2021-03-18 RX ADMIN — ASPIRIN 81 MG: 81 TABLET ORAL at 09:14

## 2021-03-18 RX ADMIN — FAMOTIDINE 20 MG: 20 TABLET, FILM COATED ORAL at 17:08

## 2021-03-18 RX ADMIN — ALBUTEROL SULFATE 2 PUFF: 90 AEROSOL, METERED RESPIRATORY (INHALATION) at 04:01

## 2021-03-18 RX ADMIN — SODIUM CHLORIDE, SODIUM LACTATE, POTASSIUM CHLORIDE, AND CALCIUM CHLORIDE 75 ML/HR: 600; 310; 30; 20 INJECTION, SOLUTION INTRAVENOUS at 01:30

## 2021-03-18 RX ADMIN — ALBUTEROL SULFATE 2 PUFF: 90 AEROSOL, METERED RESPIRATORY (INHALATION) at 07:32

## 2021-03-18 RX ADMIN — ROSUVASTATIN CALCIUM 20 MG: 20 TABLET, COATED ORAL at 21:41

## 2021-03-18 RX ADMIN — REMDESIVIR 200 MG: 100 INJECTION, POWDER, LYOPHILIZED, FOR SOLUTION INTRAVENOUS at 07:58

## 2021-03-18 RX ADMIN — ALBUTEROL SULFATE 2 PUFF: 90 AEROSOL, METERED RESPIRATORY (INHALATION) at 16:30

## 2021-03-18 RX ADMIN — ENOXAPARIN SODIUM 40 MG: 40 INJECTION SUBCUTANEOUS at 09:35

## 2021-03-18 RX ADMIN — LISINOPRIL 20 MG: 20 TABLET ORAL at 09:14

## 2021-03-18 RX ADMIN — DEXAMETHASONE SODIUM PHOSPHATE 10 MG: 10 INJECTION INTRAMUSCULAR; INTRAVENOUS at 00:27

## 2021-03-18 RX ADMIN — Medication 10 ML: at 01:30

## 2021-03-18 RX ADMIN — AZITHROMYCIN MONOHYDRATE 500 MG: 500 INJECTION, POWDER, LYOPHILIZED, FOR SOLUTION INTRAVENOUS at 01:29

## 2021-03-18 RX ADMIN — SODIUM CHLORIDE 100 ML/HR: 900 INJECTION, SOLUTION INTRAVENOUS at 00:28

## 2021-03-18 NOTE — PROGRESS NOTES
ACUTE OT GOALS:  (Developed with and agreed upon by patient and/or caregiver.)  1) Patient will complete lower body bathing and dressing with modified independence and adaptive equipment as needed. 2) Patient will complete toileting with modified independence. 3) Patient will complete functional transfers with modified independence and adaptive equipment as needed. 4) Patient will tolerate at least 20 minutes of OT activity with as needed rest breaks while maintaining O2 sats >90%. 5) Patient will verbalize at least 3 energy conservation technique to utilize during ADL/IADL. Timeframe: 7 visits       OCCUPATIONAL THERAPY ASSESSMENT: Initial Assessment and Daily Note OT Treatment Day # 1    Raza Sweet is a 62 y.o. female   PRIMARY DIAGNOSIS: Acute respiratory failure due to COVID-19 ([facility]  COVID-19 [U07.1]       Reason for Referral:  Decreased activity tolerance secondary to COVID-19  ICD-10: Treatment Diagnosis: Generalized Muscle Weakness (M62.81)  INPATIENT: Payor: BLUE CROSS / Plan: [place] BLUE [place] [facility] / Product Type: PPO /   ASSESSMENT:     REHAB RECOMMENDATIONS:   Recommendation to date pending progress:  Setting:   No further skilled therapy   Equipment:    To Be Determined     PRIOR LEVEL OF FUNCTION:  (Prior to Hospitalization)  INITIAL/CURRENT LEVEL OF FUNCTION:  (Based on today's evaluation)   Bathing:   Independent  Dressing:   Independent  Feeding/Grooming:   Independent  Toileting:   Independent  Functional Mobility:   Independent Bathing:   Minimal Assistance  Dressing:   Minimal Assistance  Feeding/Grooming:   Set Up  Toileting:   Supervision  Functional Mobility:   Standby Assistance     ASSESSMENT:  Ms. Ryan Mehta is a 62year old female with known COVID-19 admitted with SOB and hypoxia. At baseline she lives alone and is independent with all ADLs, IADLs, and working. Today, O2 sats ~ 90% on 6L at rest, but drops to 88% with activity.  Instructed patient on deep breathing and energy conservation. She is functioning below her baseline and would benefit from continued OT. Will follow. SUBJECTIVE:   Ms. Jean Omer states, \"How long do people usually stay in the hospital??.\"    SOCIAL HISTORY/LIVING ENVIRONMENT: Lives alone, independent with all ADLs, IADLs, and driving. Works in [address].    Home Environment: Private residence  One/Two Story Residence: One story  Living Alone: No  Support Systems: Family member(s)    OBJECTIVE:     PAIN: VITAL SIGNS: LINES/DRAINS:   Pre Treatment: Pain Screen  Pain Scale 1: Numeric (0 - 10)  Pain Intensity 1: 0  Post Treatment: 0 Vital Signs  O2 Sat (%): (!) 88 %(with activity)  O2 Device: Nasal cannula  O2 Flow Rate (L/min): 6 l/min IV  O2 Device: Hi flow nasal cannula     GROSS EVALUATION:  BUE Within Functional Limits Abnormal/ Functional Abnormal/ Non-Functional (see comments) Not Tested Comments:   AROM [x] [] [] []    PROM [] [] [] [x]    Strength [x] [] [] []    Balance [] [x] [] []    Posture [] [] [] [x]    Sensation [] [] [] [x]    Coordination [] [] [] [x]    Tone [] [] [] [x]    Edema [] [] [] [x]    Activity Tolerance [] [] [x] [] desats on 6L with activity     [] [] [] []      COGNITION/  PERCEPTION: Intact Impaired   (see comments) Comments:   Orientation [x] []    Vision [x] []    Hearing [x] []    Judgment/ Insight [x] []    Attention [x] []    Memory [x] []    Command Following [x] []    Emotional Regulation [] [x] Tearful, fearful of declining     [] []      ACTIVITIES OF DAILY LIVING: I Mod I S SBA CGA Min Mod Max Total NT Comments   BASIC ADLs:              Bathing/ Showering [] [] [] [] [] [] [] [] [] [x]    Toileting [] [] [x] [] [] [] [] [] [] []    Dressing [] [] [x] [] [] [] [] [] [] [] Socks    Feeding [] [] [] [] [] [] [] [] [] [x]    Grooming [] [] [x] [] [] [] [] [] [] []    Personal Device Care [] [] [] [] [] [] [] [] [] [x]    Functional Mobility [] [] [] [x] [] [] [] [] [] []    I=Independent, Mod I=Modified Independent, S=Supervision, SBA=Standby Assistance, CGA=Contact Guard Assistance,   Min=Minimal Assistance, Mod=Moderate Assistance, Max=Maximal Assistance, Total=Total Assistance, NT=Not Tested    MOBILITY: I Mod I S SBA CGA Min Mod Max Total  NT x2 Comments:   Supine to sit [] [] [] [x] [] [] [] [] [] [] []    Sit to supine [] [] [] [] [] [] [] [] [] [x] []    Sit to stand [] [] [] [x] [] [] [] [] [] [] []    Bed to chair [] [] [] [x] [] [] [] [] [] [] []    I=Independent, Mod I=Modified Independent, S=Supervision, SBA=Standby Assistance, CGA=Contact Guard Assistance,   Min=Minimal Assistance, Mod=Moderate Assistance, Max=Maximal Assistance, Total=Total Assistance, NT=Not [place] [address]   Daily Activity Inpatient Short Form        How much help from another person does the patient currently need. .. Total A Lot A Little None   1. Putting on and taking off regular lower body clothing? [] 1   [] 2   [x] 3   [] 4   2. Bathing (including washing, rinsing, drying)? [] 1   [] 2   [x] 3   [] 4   3. Toileting, which includes using toilet, bedpan or urinal?   [] 1   [] 2   [x] 3   [] 4   4. Putting on and taking off regular upper body clothing? [] 1   [] 2   [x] 3   [] 4   5. Taking care of personal grooming such as brushing teeth? [] 1   [] 2   [x] 3   [] 4   6. Eating meals? [] 1   [] 2   [x] 3   [] 4   © 2007, Trustees of [address] [place] [address] [address] [address], under license to [employer]. All rights reserved     Score:  Initial: 18 Most Recent: X (Date: -- )   Interpretation of Tool:  Represents activities that are increasingly more difficult (i.e. Bed mobility, Transfers, Gait). PLAN:   FREQUENCY/DURATION: OT Plan of Care: 3 times/week for duration of hospital stay or until stated goals are met, whichever comes first.    PROBLEM LIST:   (Skilled intervention is medically necessary to address:)  1. Decreased ADL/Functional Activities  2. Decreased Activity Tolerance  3.  Decreased Gait Ability  4. Decreased Transfer Abilities   INTERVENTIONS PLANNED:   (Benefits and precautions of occupational therapy have been discussed with the patient.)  1. Self Care Training  2. Therapeutic Activity  3. Therapeutic Exercise/HEP  4. Neuromuscular Re-education  5. Education     TREATMENT:     EVALUATION: Low Complexity : (Untimed Charge)    TREATMENT:   ($$ Self Care/Home Management: 8-22 mins    )  Self Care (10 Minutes): Self care including Toileting, Lower Body Dressing, Grooming and functional mobility and toilet transfer to increase independence and activity tolerance.         AFTER TREATMENT POSITION/PRECAUTIONS:  Chair, Needs within reach and RN notified    INTERDISCIPLINARY COLLABORATION:  RN/PCT and OT/MCCONNELL    TOTAL TREATMENT DURATION:  OT Patient Time In/Time Out  Time In: 1145  Time Out: [address]/SUJATHA

## 2021-03-18 NOTE — ED PROVIDER NOTES
Amie Fuentes Mt is a 62 y.o. female seen on 3/18/2021 in the [facility] EMERGENCY DEPT in room ER11/11. Chief Complaint   Patient presents with    Positive For Covid-19     HPI: 81-year-old female with known COVID-19 infection presented emergency department via EMS for increased shortness of breath and hypoxia. Patient was diagnosed with COVID-19 on 3/13/2021 and was seen here in the emergency department. She was discharged home at that time and given a portable pulse oximeter. Patient has been monitoring her oxygen saturations and noted that they were in the low 80s tonight with increased shortness of breath therefore she called EMS. Upon arrival to the emergency department, patient's room air pulse ox was 79%. Her oxygen levels improved with 4 L of oxygen via nasal cannula. Patient has had continued fevers, chills, fatigue, body aches, nausea, diarrhea and some low back pain. She has continued to have cough and shortness of breath. Patient denies any other concerns at this time. Historian: Patient/EMS/Previous Medical Record    REVIEW OF SYSTEMS     Review of Systems   Constitutional: Positive for chills, fatigue and fever. HENT: Negative. Respiratory: Positive for cough and shortness of breath. Cardiovascular: Negative. Gastrointestinal: Positive for diarrhea and nausea. Negative for abdominal pain and vomiting. Genitourinary: Negative. Musculoskeletal: Positive for back pain. Skin: Negative. Neurological: Negative. Hematological: Negative. Psychiatric/Behavioral: Negative. All other systems reviewed and are negative.       PAST MEDICAL HISTORY     Past Medical History:   Diagnosis Date    Acute medial meniscal tear 12/28/2018    Left    Hashimoto's disease     Hemorrhoids 08/28/2017    Internal + External per Colonoscopy    Hyperlipidemia     Hypertension     Hypothyroidism, acquired, autoimmune     Lumbar degenerative disc disease 09/20/2019    Mild Degeneration & Moderate Disc Bulges L3-S1 with Moderate Bilateral L>R Foramen Narrowings    Migraine headache     Osteoarthritis     R Toe, L Hand, Bilateral Knees    Osteopenia 4/23/2015    Spondylosis of lumbar region without myelopathy or radiculopathy 05/03/2010    Trigger ring finger     Right Middle    Vitamin B12 deficiency 12/16/2013    Vitamin D deficiency 12/16/2013     Past Surgical History:   Procedure Laterality Date    HX APPENDECTOMY      HX COLONOSCOPY  08/28/2017    Due 2027    HX KNEE ARTHROSCOPY Right 2009    HX OVARIAN CYST REMOVAL Right     HX TONSILLECTOMY       Social History     Socioeconomic History    Marital status: SINGLE     Spouse name: Not on file    Number of children: Not on file    Years of education: Not on file    Highest education level: Not on file   Tobacco Use    Smoking status: Never Smoker    Smokeless tobacco: Never Used   Substance and Sexual Activity    Alcohol use: Yes     Alcohol/week: 3.0 - 4.0 standard drinks     Types: 3 - 4 Glasses of wine per week    Drug use: No    Sexual activity: Not Currently     Prior to Admission Medications   Prescriptions Last Dose Informant Patient Reported? Taking? OTHER   Yes No   Sig: Indications: TUMERIC   Omega-3 Fatty Acids (FISH OIL) 500 mg cap   Yes No   Sig: Take  by mouth. acetaminophen (TYLENOL ARTHRITIS PAIN) 650 mg TbER   Yes No   Sig: Take 650 mg by mouth every eight (8) hours as needed. albuterol (PROVENTIL HFA, VENTOLIN HFA, PROAIR HFA) 90 mcg/actuation inhaler   No No   Sig: Take 2 Puffs by inhalation every four (4) hours as needed for Wheezing. aspirin delayed-release 81 mg tablet   Yes No   Sig: Take 81 mg by mouth daily. baclofen (LIORESAL) 10 mg tablet   No No   Sig: Take 1 Tab by mouth three (3) times daily as needed for Muscle Spasm(s).    ergocalciferol (ERGOCALCIFEROL) 1,250 mcg (50,000 unit) capsule   No No   Sig: Take 1 Cap by mouth every seven (7) days. glucosamine-chondroitin (ARTHX) 500-400 mg cap   Yes No   Sig: Take 1 Cap by mouth daily. levothyroxine (synthroid) 50 mcg tablet   No No   Sig: Take 1 Tab by mouth Daily (before breakfast). lisinopriL (PRINIVIL, ZESTRIL) 20 mg tablet   No No   Sig: Take 1 Tab by mouth daily. meloxicam (MOBIC) 7.5 mg tablet   No No   Sig: Take 1 Tab by mouth two (2) times daily (with meals). rosuvastatin (CRESTOR) 20 mg tablet   No No   Sig: Take 1 Tab by mouth nightly. Facility-Administered Medications: None     No Known Allergies     PHYSICAL EXAM       Vitals:    03/17/21 2227 03/17/21 2259 03/17/21 2325 03/17/21 2359   BP:  114/76  109/69   Pulse:  79 75 73   Resp:  22 18 18   Temp:       SpO2: 98% 96% 97% 98%    Vital signs were reviewed. Physical Exam     MEDICAL DECISION MAKING     ED Course:    Recent Results (from the past 8 hour(s))   TYPE & SCREEN    Collection Time: 03/17/21 10:42 PM   Result Value Ref Range    Crossmatch Expiration 03/20/2021,2359     ABO/Rh(D) O POSITIVE     Antibody screen NEG    CULTURE, BLOOD    Collection Time: 03/17/21 10:44 PM    Specimen: Blood   Result Value Ref Range    Special Requests: RIGHT  Antecubital        Culture result: PENDING    CBC WITH AUTOMATED DIFF    Collection Time: 03/17/21 10:44 PM   Result Value Ref Range    WBC 3.8 (L) 4.3 - 11.1 K/uL    RBC 4.78 4.05 - 5.2 M/uL    HGB 13.8 11.7 - 15.4 g/dL    HCT 42.5 35.8 - 46.3 %    MCV 88.9 79.6 - 97.8 FL    MCH 28.9 26.1 - 32.9 PG    MCHC 32.5 31.4 - 35.0 g/dL    RDW 12.9 11.9 - 14.6 %    PLATELET 153 426 - 889 K/uL    MPV 9.4 9.4 - 12.3 FL    ABSOLUTE NRBC 0.00 0.0 - 0.2 K/uL    DF AUTOMATED      NEUTROPHILS 77 43 - 78 %    LYMPHOCYTES 12 (L) 13 - 44 %    MONOCYTES 9 4.0 - 12.0 %    EOSINOPHILS 0 (L) 0.5 - 7.8 %    BASOPHILS 0 0.0 - 2.0 %    IMMATURE GRANULOCYTES 1 0.0 - 5.0 %    ABS. NEUTROPHILS 2.9 1.7 - 8.2 K/UL    ABS. LYMPHOCYTES 0.5 0.5 - 4.6 K/UL    ABS. MONOCYTES 0.4 0.1 - 1.3 K/UL    ABS. EOSINOPHILS 0.0 0.0 - 0.8 K/UL    ABS. BASOPHILS 0.0 0.0 - 0.2 K/UL    ABS. IMM. GRANS. 0.1 0.0 - 0.5 K/UL   MAGNESIUM    Collection Time: 03/17/21 10:44 PM   Result Value Ref Range    Magnesium 2.5 (H) 1.8 - 2.4 mg/dL   METABOLIC PANEL, COMPREHENSIVE    Collection Time: 03/17/21 10:44 PM   Result Value Ref Range    Sodium 134 (L) 136 - 145 mmol/L    Potassium 4.2 3.5 - 5.1 mmol/L    Chloride 97 (L) 98 - 107 mmol/L    CO2 28 21 - 32 mmol/L    Anion gap 9 7 - 16 mmol/L    Glucose 103 (H) 65 - 100 mg/dL    BUN 27 (H) 6 - 23 MG/DL    Creatinine 1.04 (H) 0.6 - 1.0 MG/DL    GFR est AA >60 >60 ml/min/1.73m2    GFR est non-AA 58 (L) >60 ml/min/1.73m2    Calcium 9.0 8.3 - 10.4 MG/DL    Bilirubin, total 0.4 0.2 - 1.1 MG/DL    ALT (SGPT) 71 (H) 12 - 65 U/L    AST (SGOT) 72 (H) 15 - 37 U/L    Alk. phosphatase 98 50 - 136 U/L    Protein, total 8.4 (H) 6.3 - 8.2 g/dL    Albumin 3.9 3.5 - 5.0 g/dL    Globulin 4.5 (H) 2.3 - 3.5 g/dL    A-G Ratio 0.9 (L) 1.2 - 3.5     PROCALCITONIN    Collection Time: 03/17/21 10:44 PM   Result Value Ref Range    Procalcitonin 0.13 ng/mL   LACTIC ACID    Collection Time: 03/17/21 10:44 PM   Result Value Ref Range    Lactic acid 1.2 0.4 - 2.0 MMOL/L   EKG, 12 LEAD, INITIAL    Collection Time: 03/17/21 10:44 PM   Result Value Ref Range    Ventricular Rate 82 BPM    Atrial Rate 82 BPM    P-R Interval 150 ms    QRS Duration 70 ms    Q-T Interval 346 ms    QTC Calculation (Bezet) 404 ms    Calculated P Axis 36 degrees    Calculated R Axis 0 degrees    Calculated T Axis 59 degrees    Diagnosis       !! AGE AND GENDER SPECIFIC ECG ANALYSIS !! Normal sinus rhythm  Normal ECG  No previous ECGs available       Xr Chest Port    Result Date: 3/17/2021  EXAM: XR CHEST PORT HISTORY: Covid +/ Hypoxia. TECHNIQUE: A single frontal view of the chest was submitted. COMPARISON: 3/13/2021 FINDINGS: The cardiac silhouette, mediastinum, and pulmonary vasculature are within normal limits.  There is no consolidation, pleural effusion, or pneumothorax. Allowing for overlying structures, there may be subtle increased opacity in the periphery of the right lung. No significant osseous abnormalities are observed. Question interval development of mild infiltrate in the right lung peripherally. The overlying scapula and breast tissue are limiting evaluation of this area. MDM  Number of Diagnoses or Management Options  Acute hypoxemic respiratory failure due to COVID-19 [facility])  Dehydration  Diagnosis management comments: 23-year-old female with known COVID-19 infection presented with hypoxia. Patient improved with 4 L of oxygen via nasal cannula. Patient also with mild increase of her creatinine and she has been having diarrhea associated with her COVID-19. Patient given Decadron in the emergency department and given IV fluids at 100 cc/h. Patient's lactic acid is normal she did have elevated white count. Her procalcitonin is normal.  Patient will be admitted to the hospitalist for further treatment evaluation. Amount and/or Complexity of Data Reviewed  Clinical lab tests: ordered and reviewed  Tests in the radiology section of CPT®: ordered and reviewed  Decide to obtain previous medical records or to obtain history from someone other than the patient: yes  Review and summarize past medical records: yes  Independent visualization of images, tracings, or specimens: yes    Patient Progress  Patient progress: improved        Disposition:  Admitted  Diagnosis:     ICD-10-CM ICD-9-CM   1. Acute hypoxemic respiratory failure due to COVID-19 ([facility])  U07.1 518.81    J96.01 079.89     799.02   2. Dehydration  E86.0 276.51     ____________________________________________________________________  A portion of this note was generated using voice recognition dictation software.  While the note has been reviewed for accuracy, please note certain words and phrases may not be transcribed as intended and some grammatical and/or typographical errors may be present.

## 2021-03-18 NOTE — PROGRESS NOTES
Admission assessment complete. Patient resting quietly, no distress noted. IVF infusing without difficulty. Alert, oriented x 4. Respirations even and unlabored, no distress noted. Oxygen in place at 6 LPM via NC. Abdomen soft, bowel sounds active in all 4 quadrants. Oriented to room and surroundings. All questions answered. Encouraged to call should needs arise. Will continue to monitor through purposeful hourly rounding.

## 2021-03-18 NOTE — PROGRESS NOTES
[employer] Hospitalist Note     Admit Date:  3/17/2021 10:20 PM   Name:  Cristi Pereyra   Age:  62 y.o.  :  1960   MRN:  [id number]   PCP:  Javier Orozco MD  Treatment Team: Attending Provider: Vinicius Neumann DO; Hospitalist: Erika Bundy [place]; Primary Nurse: Leonor Copeland RN; Utilization Review: Dian Tinsley RN; Physical Therapist: Melody Mathis, PT, DPT; Care Manager: Dane Cabrera LM; Occupational Therapist: Yomi Simon, OTR/L    HPI/Subjective:     Chief Complaint : Positive For Covid-19    Subjective:  Patient 81-year-old female with a PMH of HTN, HL, Vit D deficiency who was diagnosed with COVID-19 on  in the emergency room, discharged home with a portable oximeter, who presented again on 3/17 with increasing SOB and hypoxia. Patient found oxygen saturation to be in the low 80s, with symptoms of shortness of breath, EMS was called, on arrival patient saturation 79, requires 4 L nasal cannula, patient also complaining of fevers, chills, fatigue, body ache, nausea, diarrhea, lower back pain, nonproductive cough. She was admitted for further care. 3/18: Pt seen and examined. She is sitting comfortably in bedside chair. Per RN, pt was tearful just prior to entrance as she was having low O2 sats on 6L O2. Pt was turned up to 10L and during exam with O2 sats 90-92% at rest and with talking. Pt has had decreased oral intake, but tolerating. Denies CP, denies LE edema. She continues to have NP cough, exacerbated by deep inspirations. Assessment and Plan:     Principal Problem:    Acute respiratory failure due to COVID-19 -   - on admit requiring 6L via NC, now up to 10L. Low threshold for placing on airvo. Started on solumedrol BID, remdesivir and convalescent plasma ordered.  Azithromycin also started  - cont to monitor renal function, LFTs  - repeat CXR in AM given increasing O2 requirements    Active Problems:    Essential hypertension, benign - stable on lisinopril      Hypothyroidism, acquired, autoimmune - cont Synthroid      Hyperlipidemia with target low density lipoprotein (LDL) cholesterol less than 130 mg/dL -Cont crestor. Vitamin D deficiency - cont supplementation    DC planning/Dispo:  Await less O2 need. Likely will need 6MWT and d/c home with O2 once requirement decreases  Diet:  DIET REGULAR  DVT ppx:  Lovenox  Care d/w patient, care team, Dr. Krystal Gupta    Signed:  Patrecia Cowden, PA        Objective:     Patient Vitals for the past 24 hrs:   Temp Pulse Resp BP SpO2   03/18/21 1242     95 %   03/18/21 1149 98.8 °F (37.1 °C) 80 18 112/83 90 %   03/18/21 0757 97.9 °F (36.6 °C) 85 18 128/79 94 %   03/18/21 0732     93 %   03/18/21 0401     95 %   03/18/21 0340 97.8 °F (36.6 °C) 72 18 98/61 95 %   03/18/21 0118 99.1 °F (37.3 °C) 79 20 114/71 94 %   03/17/21 2359  73 18 109/69 98 %   03/17/21 2325  75 18  97 %   03/17/21 2259  79 22 114/76 96 %   03/17/21 2227     98 %   03/17/21 2226 99.2 °F (37.3 °C) 85 24 124/79 (!) 79 %     Oxygen Therapy  O2 Sat (%): 95 % (03/18/21 1242)  Pulse via Oximetry: 75 beats per minute (03/18/21 0401)  O2 Device: Hi flow nasal cannula (03/18/21 1242)  Skin Assessment: Redness (see comment/note) (03/18/21 0730)  O2 Flow Rate (L/min): 8 l/min (03/18/21 1242)  Intake and Output:  Date 03/17/21 0700 - 03/18/21 0659 03/18/21 0700 - 03/19/21 0659   Shift 9413-3024 3466-2326 24 Hour Total 9717-5865 2255-0164 24 Hour Total   INTAKE   P.O.    240  240     P. O.    240  240   Shift Total(mL/kg)    240(2.9)  240(2.9)   OUTPUT   Urine           Urine Occurrence(s)  1 x 1 x 1 x  1 x   Shift Total(mL/kg)         NET    240  240   Weight (kg)  82.4 82.4 82.4 82.4 82.4         Physical Exam:    General:   Well nourished. No overt distress  HEENT:  PERRLA  CV:  +S1/S2, RRR. No M/R/G. No edema noted b/l LEs  Lungs:  + bibasilar crackles noted  Abdomen: +BS, soft, nontender, nondistended. Extremities: Warm and dry. No cyanosis   Skin:  No rashes or cyanosis noted  Neuro:  No gross focal deficits. Psych:  Non-focal. Alert and oriented to person, time, place and situation    Data Reviewed:  I have reviewed all labs, meds, and studies from the last 24 hours:  Recent Results (from the past 24 hour(s))   TYPE & SCREEN    Collection Time: 03/17/21 10:42 PM   Result Value Ref Range    Crossmatch Expiration 03/20/2021,2359     ABO/Rh(D) O POSITIVE     Antibody screen NEG    CULTURE, BLOOD    Collection Time: 03/17/21 10:44 PM    Specimen: Blood   Result Value Ref Range    Special Requests: RIGHT  Antecubital        Culture result: PENDING    CBC WITH AUTOMATED DIFF    Collection Time: 03/17/21 10:44 PM   Result Value Ref Range    WBC 3.8 (L) 4.3 - 11.1 K/uL    RBC 4.78 4.05 - 5.2 M/uL    HGB 13.8 11.7 - 15.4 g/dL    HCT 42.5 35.8 - 46.3 %    MCV 88.9 79.6 - 97.8 FL    MCH 28.9 26.1 - 32.9 PG    MCHC 32.5 31.4 - 35.0 g/dL    RDW 12.9 11.9 - 14.6 %    PLATELET 566 409 - 856 K/uL    MPV 9.4 9.4 - 12.3 FL    ABSOLUTE NRBC 0.00 0.0 - 0.2 K/uL    DF AUTOMATED      NEUTROPHILS 77 43 - 78 %    LYMPHOCYTES 12 (L) 13 - 44 %    MONOCYTES 9 4.0 - 12.0 %    EOSINOPHILS 0 (L) 0.5 - 7.8 %    BASOPHILS 0 0.0 - 2.0 %    IMMATURE GRANULOCYTES 1 0.0 - 5.0 %    ABS. NEUTROPHILS 2.9 1.7 - 8.2 K/UL    ABS. LYMPHOCYTES 0.5 0.5 - 4.6 K/UL    ABS. MONOCYTES 0.4 0.1 - 1.3 K/UL    ABS. EOSINOPHILS 0.0 0.0 - 0.8 K/UL    ABS. BASOPHILS 0.0 0.0 - 0.2 K/UL    ABS. IMM.  GRANS. 0.1 0.0 - 0.5 K/UL   MAGNESIUM    Collection Time: 03/17/21 10:44 PM   Result Value Ref Range    Magnesium 2.5 (H) 1.8 - 2.4 mg/dL   METABOLIC PANEL, COMPREHENSIVE    Collection Time: 03/17/21 10:44 PM   Result Value Ref Range    Sodium 134 (L) 136 - 145 mmol/L    Potassium 4.2 3.5 - 5.1 mmol/L    Chloride 97 (L) 98 - 107 mmol/L    CO2 28 21 - 32 mmol/L    Anion gap 9 7 - 16 mmol/L    Glucose 103 (H) 65 - 100 mg/dL    BUN 27 (H) 6 - 23 MG/DL    Creatinine 1.04 (H) 0.6 - 1.0 MG/DL    GFR est AA >60 >60 ml/min/1.73m2    GFR est non-AA 58 (L) >60 ml/min/1.73m2    Calcium 9.0 8.3 - 10.4 MG/DL    Bilirubin, total 0.4 0.2 - 1.1 MG/DL    ALT (SGPT) 71 (H) 12 - 65 U/L    AST (SGOT) 72 (H) 15 - 37 U/L    Alk. phosphatase 98 50 - 136 U/L    Protein, total 8.4 (H) 6.3 - 8.2 g/dL    Albumin 3.9 3.5 - 5.0 g/dL    Globulin 4.5 (H) 2.3 - 3.5 g/dL    A-G Ratio 0.9 (L) 1.2 - 3.5     PROCALCITONIN    Collection Time: 03/17/21 10:44 PM   Result Value Ref Range    Procalcitonin 0.13 ng/mL   LACTIC ACID    Collection Time: 03/17/21 10:44 PM   Result Value Ref Range    Lactic acid 1.2 0.4 - 2.0 MMOL/L   EKG, 12 LEAD, INITIAL    Collection Time: 03/17/21 10:44 PM   Result Value Ref Range    Ventricular Rate 82 BPM    Atrial Rate 82 BPM    P-R Interval 150 ms    QRS Duration 70 ms    Q-T Interval 346 ms    QTC Calculation (Bezet) 404 ms    Calculated P Axis 36 degrees    Calculated R Axis 0 degrees    Calculated T Axis 59 degrees    Diagnosis       !! AGE AND GENDER SPECIFIC ECG ANALYSIS !!   Normal sinus rhythm  Normal ECG  No previous ECGs available  Confirmed by Neri Johnston MD (), [facility] ([id number]) on 3/18/2021 6:45:38 AM     SARS-COV-2    Collection Time: 03/18/21  1:10 AM   Result Value Ref Range    SARS-CoV-2 Please find results under separate order     SARS-COV-2, PCR    Collection Time: 03/18/21  1:10 AM    Specimen: Nasopharyngeal   Result Value Ref Range    Specimen source Nasopharyngeal      SARS-CoV-2 Detected (A) NOTD     LACTIC ACID    Collection Time: 03/18/21  1:21 AM   Result Value Ref Range    Lactic acid 0.8 0.4 - 2.0 MMOL/L   PROCALCITONIN    Collection Time: 03/18/21  4:28 AM   Result Value Ref Range    Procalcitonin 0.08 ng/mL       Current Meds:  Current Facility-Administered Medications   Medication Dose Route Frequency    aspirin delayed-release tablet 81 mg  81 mg Oral DAILY    meloxicam (MOBIC) tablet 7.5 mg  7.5 mg Oral BID WITH MEALS    levothyroxine (SYNTHROID) tablet 50 mcg  50 mcg Oral ACB    baclofen (LIORESAL) tablet 5 mg  5 mg Oral Q12H PRN    lisinopriL (PRINIVIL, ZESTRIL) tablet 20 mg  20 mg Oral DAILY    rosuvastatin (CRESTOR) tablet 20 mg  20 mg Oral QHS    sodium chloride (NS) flush 5-40 mL  5-40 mL IntraVENous Q8H    sodium chloride (NS) flush 5-40 mL  5-40 mL IntraVENous PRN    acetaminophen (TYLENOL) tablet 650 mg  650 mg Oral Q6H PRN    Or    acetaminophen (TYLENOL) suppository 650 mg  650 mg Rectal Q6H PRN    polyethylene glycol (MIRALAX) packet 17 g  17 g Oral DAILY    senna (SENOKOT) tablet 8.6 mg  1 Tab Oral DAILY PRN    bisacodyL (DULCOLAX) suppository 10 mg  10 mg Rectal DAILY PRN    ondansetron (ZOFRAN) injection 4 mg  4 mg IntraVENous Q6H PRN    famotidine (PEPCID) tablet 20 mg  20 mg Oral BID    enoxaparin (LOVENOX) injection 40 mg  40 mg SubCUTAneous Q12H    guaiFENesin-dextromethorphan (ROBITUSSIN DM) 100-10 mg/5 mL syrup 5 mL  5 mL Oral Q4H PRN    azithromycin (ZITHROMAX) tablet 250 mg  250 mg Oral DAILY    lactated Ringers infusion  75 mL/hr IntraVENous CONTINUOUS    cholecalciferol (VITAMIN D3) (5000 Units/125 mcg) tablet 5,000 Units  5,000 Units Oral DAILY    [START ON 3/19/2021] methylPREDNISolone (PF) (SOLU-MEDROL) injection 60 mg  60 mg IntraVENous Q12H    0.9% sodium chloride infusion 250 mL  250 mL IntraVENous PRN    [START ON 3/19/2021] remdesivir 100 mg in 0.9% sodium chloride 250 mL IVPB  100 mg IntraVENous Q24H    albuterol (PROVENTIL HFA, VENTOLIN HFA, PROAIR HFA) inhaler 2 Puff  2 Puff Inhalation Q4H RT       Other Studies:  No results found for this visit on 03/17/21. Ct Chest Wo Cont    Result Date: 3/18/2021  EXAMINATION: CT CHEST WO CONT HISTORY: History of Covid, rule out ARDS. TECHNIQUE: Axial images of the chest were obtained without the administration of intravenous contrast. Coronal reformatted images were submitted.  All CT scans are performed using dose optimization technique as appropriate to a performed exam including automated exposure control and/or standardized protocols for targeted exams where dose is matched to indication/reason for exam/patient size. COMPARISON: CT heart dated 2/16/2021. FINDINGS: The visualized thyroid gland is unremarkable. There are no enlarged mediastinal, hilar, or axillary lymph nodes. The heart is not enlarged and there is no pericardial effusion. The esophagus appears normal. The airways are patent. There is no consolidation, pleural effusion, or pneumothorax. No pulmonary nodules. Bilateral groundglass infiltrates are seen, slightly greater on the right. These have developed since the previous examination. Visualized upper upper abdomen is normal. No aggressive osseous lesions. The soft tissues are normal.     Interval development of bilateral peripherally oriented, groundglass lung infiltrates. These findings are consistent with an atypical pneumonia including Covid. The appearance of the lung parenchyma at this time does not suggest ARDS. Xr Chest Port    Result Date: 3/17/2021  EXAM: XR CHEST PORT HISTORY: Covid +/ Hypoxia. TECHNIQUE: A single frontal view of the chest was submitted. COMPARISON: 3/13/2021 FINDINGS: The cardiac silhouette, mediastinum, and pulmonary vasculature are within normal limits. There is no consolidation, pleural effusion, or pneumothorax. Allowing for overlying structures, there may be subtle increased opacity in the periphery of the right lung. No significant osseous abnormalities are observed. Question interval development of mild infiltrate in the right lung peripherally. The overlying scapula and breast tissue are limiting evaluation of this area. All Micro Results     Procedure Component Value Units Date/Time    SARS-COV-2, PCR [[id number]]  (Abnormal) Collected: 03/18/21 0110    Order Status: Completed Specimen: Nasopharyngeal Updated: 03/18/21 1048     Specimen source Nasopharyngeal        SARS-CoV-2 Detected        Comment:       The specimen is POSITIVE for SARS-CoV-2, the novel coronavirus associated with COVID-19. This test has been authorized by the FDA under an Emergency Use Authorization (EUA) for use by authorized laboratories.         Fact sheet for Healthcare Providers: ConventionUpdate.co.nz  Fact sheet for Patients: https://fda.gov/media/497624/download       Methodology: RT-PCR  RESULTS VERIFIED, PHONED TO AND READ BACK BY  CAMMIE INIGUEZ ON 3/18/21 @1047, TA         CULTURE, BLOOD [[id number]] Collected: 03/17/21 2244    Order Status: Completed Specimen: Blood Updated: 03/18/21 0008     Special Requests: --        RIGHT  Antecubital       Culture result: PENDING    CULTURE, BLOOD [[id number]] Collected: 03/17/21 2245    Order Status: Canceled Specimen: Blood

## 2021-03-18 NOTE — ADT AUTH CERT NOTES
[facility] 
  
FACILITY NPI :[id number] FACILITY TAX ID :  
  
 [address] [facility] [address] 
[phone] 
  
  
   
Patient Name :Ana Staton  : 1960 (60 yrs) MRN : [id number] 
  
Patient Mailing Address [address] [place] [address] [41] , [address]      
  
  . 
  
   
Insurance Plan Payor: BLUE CROSS / Plan: [place] [employer] [place] / Product Type: PPO /  
  
Primary Coverage Subscriber ID : [id number] 
  
Secondary Coverage:  N/A 
  
Secondary Subscriber ID :   
   
Current Patient Class : INPATIENT Admit Date : 3/17/2021 
  
REQUESTED LEVEL OF CARE: INPATIENT [101] Diagnosis : COVID-19 
                    
  
ICD10 Code : HTGYY-19 [U07.1]   
Current Room and Bed 505/01 
  
Admitting and Attending Info: 
Admitting Provider : Matthew Shah DO   NPI: [id number] Admitting Provider Phone. [phone] Admitting Provider Address: [place] [address] 
  
Attending Provider Callie Welch [place] [address] Attending Provider Address:  [address] [place] [address] 
  
Attending Provider Phone: Attending phys phone: [phone] 
   
 
 
 
H&P Notes 
 
 H&P by Lesli Sarkar DO at 21 documented on ED to Hosp-Admission (Current) from 3/17/2021 in [address] Author: Lesli Sarkar DO Author Type: Physician Filed: 21 0733 Note Status: Addendum Cosign: Cosign Not Required Date of Service: 21 : Lesli Sarkar DO (Physician) Prior Versions: 1.  Lesli Sarkar DO (Physician) at 21 1359 - Signed Expand AllCollapse All   
  
[facility] H&P/CONSULT 
NAME:            Katie Riley Age:                62 y.o. 
:               1960 MRN:               [id number] PCP: Rodney Ruby MD 
Consulting MD: Treatment Team: Attending Provider: Nilo Alatorre DO; Primary Nurse: Sheila Simpson; Primary Nurse: Mariana Patel RN 
HPI:  
Patient 52-year-old female, diagnosis of COVID-19 on , in the emergency room, discharged home with a portable oximeter, patient's found proximally to be in the low 80s, with symptoms of shortness of breath, EMS was called, on arrival patient saturation 79, requires 4 L nasal cannula, patient also complaining of fevers, chills, fatigue, body ache, nausea, diarrhea, lower back pain, nonproductive cough   
  
  
Complete ROS done and is as stated in HPI or otherwise negative Past Medical History:  
Diagnosis Date  Acute medial meniscal tear 2018  
  Left  Hashimoto's disease    
 Hemorrhoids 2017  
  Internal + External per Colonoscopy  Hyperlipidemia    
 Hypertension    
 Hypothyroidism, acquired, autoimmune    
 Lumbar degenerative disc disease 2019  
  Mild Degeneration & Moderate Disc Bulges L3-S1 with Moderate Bilateral L>R Foramen Narrowings  Migraine headache    
 Osteoarthritis    
  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    
  Right Middle  Vitamin B12 deficiency 2013  Vitamin D deficiency 2013 Past Surgical History:  
Procedure Laterality Date  HX APPENDECTOMY      
 HX COLONOSCOPY   2017  
  Due   HX KNEE ARTHROSCOPY Right 2009  HX OVARIAN CYST REMOVAL Right    
 HX TONSILLECTOMY      
   
Prior to Admission Medications Prescriptions Last Dose Informant Patient Reported? Taking? OTHER     Yes No  
Sig: Indications: Rima Ramirez Omega-3 Fatty Acids (FISH OIL) 500 mg cap     Yes No  
Sig: Take  by mouth. acetaminophen (TYLENOL ARTHRITIS PAIN) 650 mg TbER     Yes No  
Sig: Take 650 mg by mouth every eight (8) hours as needed. albuterol (PROVENTIL HFA, VENTOLIN HFA, PROAIR HFA) 90 mcg/actuation inhaler     No No  
Sig: Take 2 Puffs by inhalation every four (4) hours as needed for Wheezing. aspirin delayed-release 81 mg tablet     Yes No  
Sig: Take 81 mg by mouth daily. baclofen (LIORESAL) 10 mg tablet     No No  
Sig: Take 1 Tab by mouth three (3) times daily as needed for Muscle Spasm(s). ergocalciferol (ERGOCALCIFEROL) 1,250 mcg (50,000 unit) capsule     No No  
Sig: Take 1 Cap by mouth every seven (7) days. glucosamine-chondroitin (ARTHX) 500-400 mg cap     Yes No  
Sig: Take 1 Cap by mouth daily. levothyroxine (synthroid) 50 mcg tablet     No No  
Sig: Take 1 Tab by mouth Daily (before breakfast). lisinopriL (PRINIVIL, ZESTRIL) 20 mg tablet     No No  
Sig: Take 1 Tab by mouth daily. meloxicam (MOBIC) 7.5 mg tablet     No No  
Sig: Take 1 Tab by mouth two (2) times daily (with meals). rosuvastatin (CRESTOR) 20 mg tablet     No No  
Sig: Take 1 Tab by mouth nightly. Facility-Administered Medications: None  
  
No Known Allergies Social History  
  
Tobacco Use  Smoking status: Never Smoker  Smokeless tobacco: Never Used Substance Use Topics  Alcohol use: Yes  
    Alcohol/week: 3.0 - 4.0 standard drinks  
    Types: 3 - 4 Glasses of wine per week Family History Problem Relation Age of Onset  Cancer Mother    
      Lung  Hypertension Mother    
 Diabetes Mother    
 Hypertension Father    
 Heart Disease Father    
 Hypertension Brother    
 Breast Cancer Neg Hx    
   
Objective:  
  
Visit Vitals /69 Pulse 73 Temp 99.2 °F (37.3 °C) Resp 18 Ht 5' 2\" (1.575 m) Wt 83.5 kg (184 lb) LMP 2010 SpO2 98% BMI 33.65 kg/m² Temp (24hrs), Av.2 °F (37.3 °C), Min:99.2 °F (37.3 °C), Max:99.2 °F (37.3 °C) 
  
Oxygen Therapy O2 Sat (%): 98 % (03/17/21 2359) Pulse via Oximetry: 74 beats per minute (03/17/21 2359) O2 Device: Nasal cannula (03/17/21 2227) O2 Flow Rate (L/min): 6 l/min (03/17/21 2227) Physical Exam: 
General:          Alert, cooperative, no distress, appears stated age. Head:               Normocephalic, without obvious abnormality, atraumatic. Nose:               Nares normal. No drainage or sinus tenderness. Lungs:             Coarse breath sounds bilaterally  
heart:               Regular rate and rhythm,  no murmur, rub or gallop. Abdomen:        Soft, non-tender. Not distended. Bowel sounds normal.  
Extremities:     No cyanosis. No edema. No clubbing Skin:                Texture, turgor normal. No rashes or lesions. Not Jaundiced Neurologic:      Alert and oriented x 3, no focal deficits Data Review:  
Recent Results Recent Results (from the past 24 hour(s)) TYPE & SCREEN  
  Collection Time: 03/17/21 10:42 PM  
Result Value Ref Range  
  Crossmatch Expiration 03/20/2021,2359    
  ABO/Rh(D) O POSITIVE    
  Antibody screen NEG    
CULTURE, BLOOD  
  Collection Time: 03/17/21 10:44 PM  
  Specimen: Blood Result Value Ref Range  
  Special Requests: RIGHT Antecubital 
     
  Culture result: PENDING    
CBC WITH AUTOMATED DIFF  
  Collection Time: 03/17/21 10:44 PM  
Result Value Ref Range  
  WBC 3.8 (L) 4.3 - 11.1 K/uL  
  RBC 4.78 4.05 - 5.2 M/uL  
  HGB 13.8 11.7 - 15.4 g/dL  
  HCT 42.5 35.8 - 46.3 %  
  MCV 88.9 79.6 - 97.8 FL  
  MCH 28.9 26.1 - 32.9 PG  
  MCHC 32.5 31.4 - 35.0 g/dL  
  RDW 12.9 11.9 - 14.6 %  
  PLATELET 597 912 - 871 K/uL  
  MPV 9.4 9.4 - 12.3 FL  
  ABSOLUTE NRBC 0.00 0.0 - 0.2 K/uL  
  DF AUTOMATED    
  NEUTROPHILS 77 43 - 78 %  
  LYMPHOCYTES 12 (L) 13 - 44 %  
  MONOCYTES 9 4.0 - 12.0 %  
  EOSINOPHILS 0 (L) 0.5 - 7.8 %  
  BASOPHILS 0 0.0 - 2.0 %  
  IMMATURE GRANULOCYTES 1 0.0 - 5.0 %  
  ABS. NEUTROPHILS 2.9 1.7 - 8.2 K/UL  
  ABS. LYMPHOCYTES 0.5 0.5 - 4.6 K/UL  
  ABS. MONOCYTES 0.4 0.1 - 1.3 K/UL  
  ABS. EOSINOPHILS 0.0 0.0 - 0.8 K/UL  
  ABS. BASOPHILS 0.0 0.0 - 0.2 K/UL  
  ABS. IMM. GRANS. 0.1 0.0 - 0.5 K/UL MAGNESIUM  
  Collection Time: 03/17/21 10:44 PM  
Result Value Ref Range  
  Magnesium 2.5 (H) 1.8 - 2.4 mg/dL METABOLIC PANEL, COMPREHENSIVE  
  Collection Time: 03/17/21 10:44 PM  
Result Value Ref Range  
  Sodium 134 (L) 136 - 145 mmol/L  
  Potassium 4.2 3.5 - 5.1 mmol/L  
  Chloride 97 (L) 98 - 107 mmol/L  
  CO2 28 21 - 32 mmol/L  
  Anion gap 9 7 - 16 mmol/L  
  Glucose 103 (H) 65 - 100 mg/dL  
  BUN 27 (H) 6 - 23 MG/DL  
  Creatinine 1.04 (H) 0.6 - 1.0 MG/DL  
  GFR est AA >60 >60 ml/min/1.73m2  
  GFR est non-AA 58 (L) >60 ml/min/1.73m2  
  Calcium 9.0 8.3 - 10.4 MG/DL  
  Bilirubin, total 0.4 0.2 - 1.1 MG/DL  
  ALT (SGPT) 71 (H) 12 - 65 U/L  
  AST (SGOT) 72 (H) 15 - 37 U/L  
  Alk. phosphatase 98 50 - 136 U/L  
  Protein, total 8.4 (H) 6.3 - 8.2 g/dL  
  Albumin 3.9 3.5 - 5.0 g/dL  
  Globulin 4.5 (H) 2.3 - 3.5 g/dL  
  A-G Ratio 0.9 (L) 1.2 - 3.5 PROCALCITONIN  
  Collection Time: 03/17/21 10:44 PM  
Result Value Ref Range  
  Procalcitonin 0.13 ng/mL LACTIC ACID  
  Collection Time: 03/17/21 10:44 PM  
Result Value Ref Range  
  Lactic acid 1.2 0.4 - 2.0 MMOL/L  
EKG, 12 LEAD, INITIAL  
  Collection Time: 03/17/21 10:44 PM  
Result Value Ref Range  
  Ventricular Rate 82 BPM  
  Atrial Rate 82 BPM  
  P-R Interval 150 ms  
  QRS Duration 70 ms  
  Q-T Interval 346 ms  
  QTC Calculation (Bezet) 404 ms  
  Calculated P Axis 36 degrees  
  Calculated R Axis 0 degrees  
  Calculated T Axis 59 degrees  
  Diagnosis      
    !! AGE AND GENDER SPECIFIC ECG ANALYSIS !! Normal sinus rhythm Normal ECG No previous ECGs available 
   
  
  
Imaging [facility] XR CHEST PORT Final Result Question interval development of mild infiltrate in the right lung peripherally. The overlying scapula and breast tissue are limiting evaluation of this area.  
   
   
  
  
Assessment and Plan:  
  
    
Active Hospital Problems  
  Diagnosis Date Noted  COVID-19 03/18/2021  
  
  
Acute hypoxic respiratory failure requiring 6 L nasal cannula-secondary to COVID-19 pneumonia, high risk due to morbid obesity, high calcium score likely CAD Day 5 of COVID-19 diagnoses, with new asp failure,  will order remdesivir & convalescent plasma 
-Start Solu-Medrol 60 twice daily,Follow CT chest, ABG, patient's the window for cytokines going, if qualify for ARDS, consider pulse dose steroids 250 mg daily for 3 days, then taper  
-Procalcitonin less than 0.25, continue azithromycin for anti-inflammatory and prevention of secondary bacterial pneumonia 
  
Diarrhealikely secondary to COVID-19, gentle hydration's, keep euvolemia 
  
History of vitamin B12 deficiency, vitamin D deficiency, hyperlipidemia, hypothyroidism on chronic Synthroid, hypertension 
-resume home medications if indicated ,and monitor clinically while inpatient, currently stable co morbidities add to patient's case complexity 
  
  
  
DVT PPx: lovenox Code Status: full  
  
Anticipated discharge: 2-4 days, depending on patient's progression 
  
Signed By: Mariama De La Fuente DO   
  March 18, 2021

## 2021-03-18 NOTE — ACP (ADVANCE CARE PLANNING)
Advance Care Planning     General Advance Care Planning (ACP) Conversation      Date of Conversation: 3/17/2021  Conducted with: Patient with Decision Making Capacity    Healthcare Decision Maker:     Click here to complete [address] including selection of the [address] Relationship (ie \"Primary\")  Today we documented Decision Maker(s) consistent with Legal Next of Kin hierarchy.     Content/Action Overview:   Has NO ACP documents/care preferences - requested patient complete ACP documents  Reviewed DNR/DNI and patient elects Full Code (Attempt Resuscitation)  Topics discussed: ventilation preferences and resuscitation preferences  Additional Comments: CM placed an order to spiritual care to assist pt with completing HCPOA     Length of Voluntary ACP Conversation in minutes:  16 minutes    Ansel Saint, LMSW

## 2021-03-18 NOTE — H&P
[facility] H&P/CONSULT  NAME:  Valentina Olivo   Age:  62 y.o.  :   1960   MRN:   [id number]  PCP: Adelita Rodriguez MD  Consulting MD:  Treatment Team: Attending Provider: Layne Smyth DO; Primary Nurse: Morales Modi; Primary Nurse: Zuri Peña RN  HPI:   Patient 64-year-old female, diagnosis of COVID-19 on , in the emergency room, discharged home with a portable oximeter, patient's found proximally to be in the low 80s, with symptoms of shortness of breath, EMS was called, on arrival patient saturation 79, requires 4 L nasal cannula, patient also complaining of fevers, chills, fatigue, body ache, nausea, diarrhea, lower back pain, nonproductive cough        Complete ROS done and is as stated in HPI or otherwise negative  Past Medical History:   Diagnosis Date    Acute medial meniscal tear 2018    Left    Hashimoto's disease     Hemorrhoids 2017    Internal + External per Colonoscopy    Hyperlipidemia     Hypertension     Hypothyroidism, acquired, autoimmune     Lumbar degenerative disc disease 2019    Mild Degeneration & Moderate Disc Bulges L3-S1 with Moderate Bilateral L>R Foramen Narrowings    Migraine headache     Osteoarthritis     R Toe, L Hand, Bilateral Knees    Osteopenia 2015    Spondylosis of lumbar region 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 Taking? OTHER   Yes No   Sig: Indications: TUMERIC   Omega-3 Fatty Acids (FISH OIL) 500 mg cap   Yes No   Sig: Take  by mouth.    acetaminophen (TYLENOL ARTHRITIS PAIN) 650 mg TbER   Yes No   Sig: Take 650 mg by mouth every eight (8) hours as needed. albuterol (PROVENTIL HFA, VENTOLIN HFA, PROAIR HFA) 90 mcg/actuation inhaler   No No   Sig: Take 2 Puffs by inhalation every four (4) hours as needed for Wheezing. aspirin delayed-release 81 mg tablet   Yes No   Sig: Take 81 mg by mouth daily. baclofen (LIORESAL) 10 mg tablet   No No   Sig: Take 1 Tab by mouth three (3) times daily as needed for Muscle Spasm(s). ergocalciferol (ERGOCALCIFEROL) 1,250 mcg (50,000 unit) capsule   No No   Sig: Take 1 Cap by mouth every seven (7) days. glucosamine-chondroitin (ARTHX) 500-400 mg cap   Yes No   Sig: Take 1 Cap by mouth daily. levothyroxine (synthroid) 50 mcg tablet   No No   Sig: Take 1 Tab by mouth Daily (before breakfast). lisinopriL (PRINIVIL, ZESTRIL) 20 mg tablet   No No   Sig: Take 1 Tab by mouth daily. meloxicam (MOBIC) 7.5 mg tablet   No No   Sig: Take 1 Tab by mouth two (2) times daily (with meals). rosuvastatin (CRESTOR) 20 mg tablet   No No   Sig: Take 1 Tab by mouth nightly. Facility-Administered Medications: None     No Known Allergies   Social History     Tobacco Use    Smoking status: Never Smoker    Smokeless tobacco: Never Used   Substance Use Topics    Alcohol use:  Yes     Alcohol/week: 3.0 - 4.0 standard drinks     Types: 3 - 4 Glasses of wine per week      Family History   Problem Relation Age of Onset    Cancer Mother         Lung    Hypertension Mother     Diabetes Mother     Hypertension Father     Heart Disease Father     Hypertension Brother     Breast Cancer Neg Hx        Objective:     Visit Vitals  /69   Pulse 73   Temp 99.2 °F (37.3 °C)   Resp 18   Ht 5' 2\" (1.575 m)   Wt 83.5 kg (184 lb)   LMP 2010   SpO2 98%   BMI 33.65 kg/m²      Temp (24hrs), Av.2 °F (37.3 °C), Min:99.2 °F (37.3 °C), Max:99.2 °F (37.3 °C)    Oxygen Therapy  O2 Sat (%): 98 % (21 2359)  Pulse via Oximetry: 74 beats per minute (21 0244)  O2 Device: Nasal cannula (03/17/21 2227)  O2 Flow Rate (L/min): 6 l/min (03/17/21 2227)  Physical Exam:  General:    Alert, cooperative, no distress, appears stated age. Head:   Normocephalic, without obvious abnormality, atraumatic. Nose:  Nares normal. No drainage or sinus tenderness. Lungs:   Coarse breath sounds bilaterally   heart:   Regular rate and rhythm,  no murmur, rub or gallop. Abdomen:   Soft, non-tender. Not distended. Bowel sounds normal.   Extremities: No cyanosis. No edema. No clubbing  Skin:     Texture, turgor normal. No rashes or lesions. Not Jaundiced  Neurologic: Alert and oriented x 3, no focal deficits   Data Review:   Recent Results (from the past 24 hour(s))   TYPE & SCREEN    Collection Time: 03/17/21 10:42 PM   Result Value Ref Range    Crossmatch Expiration 03/20/2021,2359     ABO/Rh(D) O POSITIVE     Antibody screen NEG    CULTURE, BLOOD    Collection Time: 03/17/21 10:44 PM    Specimen: Blood   Result Value Ref Range    Special Requests: RIGHT  Antecubital        Culture result: PENDING    CBC WITH AUTOMATED DIFF    Collection Time: 03/17/21 10:44 PM   Result Value Ref Range    WBC 3.8 (L) 4.3 - 11.1 K/uL    RBC 4.78 4.05 - 5.2 M/uL    HGB 13.8 11.7 - 15.4 g/dL    HCT 42.5 35.8 - 46.3 %    MCV 88.9 79.6 - 97.8 FL    MCH 28.9 26.1 - 32.9 PG    MCHC 32.5 31.4 - 35.0 g/dL    RDW 12.9 11.9 - 14.6 %    PLATELET 442 993 - 884 K/uL    MPV 9.4 9.4 - 12.3 FL    ABSOLUTE NRBC 0.00 0.0 - 0.2 K/uL    DF AUTOMATED      NEUTROPHILS 77 43 - 78 %    LYMPHOCYTES 12 (L) 13 - 44 %    MONOCYTES 9 4.0 - 12.0 %    EOSINOPHILS 0 (L) 0.5 - 7.8 %    BASOPHILS 0 0.0 - 2.0 %    IMMATURE GRANULOCYTES 1 0.0 - 5.0 %    ABS. NEUTROPHILS 2.9 1.7 - 8.2 K/UL    ABS. LYMPHOCYTES 0.5 0.5 - 4.6 K/UL    ABS. MONOCYTES 0.4 0.1 - 1.3 K/UL    ABS. EOSINOPHILS 0.0 0.0 - 0.8 K/UL    ABS. BASOPHILS 0.0 0.0 - 0.2 K/UL    ABS. IMM.  GRANS. 0.1 0.0 - 0.5 K/UL   MAGNESIUM    Collection Time: 03/17/21 10:44 PM Result Value Ref Range    Magnesium 2.5 (H) 1.8 - 2.4 mg/dL   METABOLIC PANEL, COMPREHENSIVE    Collection Time: 03/17/21 10:44 PM   Result Value Ref Range    Sodium 134 (L) 136 - 145 mmol/L    Potassium 4.2 3.5 - 5.1 mmol/L    Chloride 97 (L) 98 - 107 mmol/L    CO2 28 21 - 32 mmol/L    Anion gap 9 7 - 16 mmol/L    Glucose 103 (H) 65 - 100 mg/dL    BUN 27 (H) 6 - 23 MG/DL    Creatinine 1.04 (H) 0.6 - 1.0 MG/DL    GFR est AA >60 >60 ml/min/1.73m2    GFR est non-AA 58 (L) >60 ml/min/1.73m2    Calcium 9.0 8.3 - 10.4 MG/DL    Bilirubin, total 0.4 0.2 - 1.1 MG/DL    ALT (SGPT) 71 (H) 12 - 65 U/L    AST (SGOT) 72 (H) 15 - 37 U/L    Alk. phosphatase 98 50 - 136 U/L    Protein, total 8.4 (H) 6.3 - 8.2 g/dL    Albumin 3.9 3.5 - 5.0 g/dL    Globulin 4.5 (H) 2.3 - 3.5 g/dL    A-G Ratio 0.9 (L) 1.2 - 3.5     PROCALCITONIN    Collection Time: 03/17/21 10:44 PM   Result Value Ref Range    Procalcitonin 0.13 ng/mL   LACTIC ACID    Collection Time: 03/17/21 10:44 PM   Result Value Ref Range    Lactic acid 1.2 0.4 - 2.0 MMOL/L   EKG, 12 LEAD, INITIAL    Collection Time: 03/17/21 10:44 PM   Result Value Ref Range    Ventricular Rate 82 BPM    Atrial Rate 82 BPM    P-R Interval 150 ms    QRS Duration 70 ms    Q-T Interval 346 ms    QTC Calculation (Bezet) 404 ms    Calculated P Axis 36 degrees    Calculated R Axis 0 degrees    Calculated T Axis 59 degrees    Diagnosis       !! AGE AND GENDER SPECIFIC ECG ANALYSIS !! Normal sinus rhythm  Normal ECG  No previous ECGs available       Imaging /Procedures /Studies   XR CHEST PORT   Final Result   Question interval development of mild infiltrate in the right lung peripherally. The overlying scapula and breast tissue are limiting evaluation of this area. Assessment and Plan:      Active Hospital Problems    Diagnosis Date Noted    COVID-19 03/18/2021       Acute hypoxic respiratory failure requiring 6 L nasal cannula-secondary to COVID-19 pneumonia, high risk due to morbid obesity, high calcium score likely CAD  Day 5 of COVID-19 diagnoses, with new asp failure,  will order remdesivir & convalescent plasma  -Start Solu-Medrol 60 twice daily,Follow CT chest, ABG, patient's the window for cytokines going, if qualify for ARDS, consider pulse dose steroids 250 mg daily for 3 days, then taper   -Procalcitonin less than 0.25, continue azithromycin for anti-inflammatory and prevention of secondary bacterial pneumonia    Diarrhealikely secondary to COVID-19, gentle hydration's, keep euvolemia    History of vitamin B12 deficiency, vitamin D deficiency, hyperlipidemia, hypothyroidism on chronic Synthroid, hypertension  -resume home medications if indicated ,and monitor clinically while inpatient, currently stable co morbidities add to patient's case complexity         DVT PPx: lovenox  Code Status: full     Anticipated discharge: 2-4 days, depending on patient's progression    Signed By: Matthew Briones DO     March 18, 2021

## 2021-03-18 NOTE — ED NOTES
TRANSFER - OUT REPORT:    Verbal report given to Rose(name) on Ghazal Awad  being transferred to [facility](unit) for routine progression of care       Report consisted of patients Situation, Background, Assessment and   Recommendations(SBAR). Information from the following report(s) SBAR was reviewed with the receiving nurse. Lines:   Peripheral IV 03/17/21 Right Antecubital (Active)   Site Assessment Clean, dry, & intact 03/17/21 2246   Phlebitis Assessment 0 03/17/21 2246   Infiltration Assessment 0 03/17/21 2246   Dressing Status Clean, dry, & intact 03/17/21 2246   Dressing Type 4 X 4 03/17/21 2246   Hub Color/Line Status Green 03/17/21 2246   Alcohol Cap Used Yes 03/17/21 2246        Opportunity for questions and clarification was provided.       Patient transported with:   O2 @ 4 liters

## 2021-03-18 NOTE — ED TRIAGE NOTES
Patient arrives to ED via EMS from home. Patient tested positive for Covid on Saturday. Patient called out because she had low O2. When EMS arrived patient's oxygen saturation was 88% on RA. Patient placed on 6L of oxygen via nc with improvement. When patient arrived to ED patient 79% on RA. Patient states she still has fever on and off. Patient has nausea and diarrhea. Denies vomiting. Patient took tylenol at 1600 for fever.

## 2021-03-18 NOTE — PROGRESS NOTES
03/18/21 0152   Dual Skin Pressure Injury Assessment   Dual Skin Pressure Injury Assessment WDL   Second Care Provider (Based on [address] [place] [address]) Travon Sun RN   Skin Integumentary   Skin Integumentary (WDL) WDL    Pressure  Injury Documentation No Pressure Injury Noted-Pressure Ulcer Prevention Initiated   Skin Color Appropriate for ethnicity   Skin Condition/Temp Dry; Warm   Skin Integrity Intact

## 2021-03-18 NOTE — ADT AUTH CERT NOTES
[facility] 
  
FACILITY NPI :[id number] FACILITY TAX ID :  
  
[facility] [address] 
[phone] 
  
  
   
Patient Name :Diane Kingston  : 1960 (60 yrs) MRN : [id number] 
  
Patient Mailing Address [address] [place] [address] [41] , [id number]      
  
  . 
  
   
Insurance Plan Payor: BLUE CROSS / Plan: [place] [employer] [place] / Product Type: PPO /  
  
Primary Coverage Subscriber ID : [id number] 
  
Secondary Coverage:  N/A 
  
Secondary Subscriber ID :   
   
Current Patient Class : INPATIENT Admit Date : 3/18/2021 
  
REQUESTED LEVEL OF CARE: INPATIENT [101] Diagnosis : COVID-19 
                    
  
ICD10 Code : HVEOP-25 [U07.1]   
Current Room and Bed 505/01 
  
Admitting and Attending Info: 
Admitting Provider : Ronnie Lamb DO   NPI: [id number] Admitting Provider Phone. [phone] Admitting Provider Address: [place] [address] 
  
Attending Provider Boo Flores [place] [address] Attending Provider Address:  [address] [place] [address] 
  
Attending Provider Phone: Attending javier phone: [phone] 
   
 
 
 
H&P Notes 
 
 H&P by Gloria Park DO at 21 documented on ED to Hosp-Admission (Current) from 3/17/2021 in [address] Author: Gloria Park DO Author Type: Physician Filed: 21 8014 Note Status: Addendum Cosign: Cosign Not Required Date of Service: 21 : Gloria Park DO (Physician) Prior Versions: 1.  Gloria Park DO (Physician) at 21 1364 - Signed Expand AllCollapse All   
  
[employer] [facility] H&P/CONSULT 
NAME:            Warren Barrow Age:                62 y.o. 
:               1960 MRN:               [id number] PCP: Ralph Palomares MD 
Consulting MD: Treatment Team: Attending Provider: Félix Jean DO; Primary Nurse: Megan Helton; Primary Nurse: Dina Vail RN 
HPI:  
Patient 52-year-old female, diagnosis of COVID-19 on , in the emergency room, discharged home with a portable oximeter, patient's found proximally to be in the low 80s, with symptoms of shortness of breath, EMS was called, on arrival patient saturation 79, requires 4 L nasal cannula, patient also complaining of fevers, chills, fatigue, body ache, nausea, diarrhea, lower back pain, nonproductive cough   
  
  
Complete ROS done and is as stated in HPI or otherwise negative Past Medical History:  
Diagnosis Date  Acute medial meniscal tear 2018  
  Left  Hashimoto's disease    
 Hemorrhoids 2017  
  Internal + External per Colonoscopy  Hyperlipidemia    
 Hypertension    
 Hypothyroidism, acquired, autoimmune    
 Lumbar degenerative disc disease 2019  
  Mild Degeneration & Moderate Disc Bulges L3-S1 with Moderate Bilateral L>R Foramen Narrowings  Migraine headache    
 Osteoarthritis    
  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    
  Right Middle  Vitamin B12 deficiency 2013  Vitamin D deficiency 2013 Past Surgical History:  
Procedure Laterality Date  HX APPENDECTOMY      
 HX COLONOSCOPY   2017  
  Due   HX KNEE ARTHROSCOPY Right 2009  HX OVARIAN CYST REMOVAL Right    
 HX TONSILLECTOMY      
   
Prior to Admission Medications Prescriptions Last Dose Informant Patient Reported? Taking? OTHER     Yes No  
Sig: Indications: Zafar Alonso Omega-3 Fatty Acids (FISH OIL) 500 mg cap     Yes No  
Sig: Take  by mouth. acetaminophen (TYLENOL ARTHRITIS PAIN) 650 mg TbER     Yes No  
Sig: Take 650 mg by mouth every eight (8) hours as needed. albuterol (PROVENTIL HFA, VENTOLIN HFA, PROAIR HFA) 90 mcg/actuation inhaler     No No  
Sig: Take 2 Puffs by inhalation every four (4) hours as needed for Wheezing. aspirin delayed-release 81 mg tablet     Yes No  
Sig: Take 81 mg by mouth daily. baclofen (LIORESAL) 10 mg tablet     No No  
Sig: Take 1 Tab by mouth three (3) times daily as needed for Muscle Spasm(s). ergocalciferol (ERGOCALCIFEROL) 1,250 mcg (50,000 unit) capsule     No No  
Sig: Take 1 Cap by mouth every seven (7) days. glucosamine-chondroitin (ARTHX) 500-400 mg cap     Yes No  
Sig: Take 1 Cap by mouth daily. levothyroxine (synthroid) 50 mcg tablet     No No  
Sig: Take 1 Tab by mouth Daily (before breakfast). lisinopriL (PRINIVIL, ZESTRIL) 20 mg tablet     No No  
Sig: Take 1 Tab by mouth daily. meloxicam (MOBIC) 7.5 mg tablet     No No  
Sig: Take 1 Tab by mouth two (2) times daily (with meals). rosuvastatin (CRESTOR) 20 mg tablet     No No  
Sig: Take 1 Tab by mouth nightly. Facility-Administered Medications: None  
  
No Known Allergies Social History  
  
Tobacco Use  Smoking status: Never Smoker  Smokeless tobacco: Never Used Substance Use Topics  Alcohol use: Yes  
    Alcohol/week: 3.0 - 4.0 standard drinks  
    Types: 3 - 4 Glasses of wine per week Family History Problem Relation Age of Onset  Cancer Mother    
      Lung  Hypertension Mother    
 Diabetes Mother    
 Hypertension Father    
 Heart Disease Father    
 Hypertension Brother    
 Breast Cancer Neg Hx    
   
Objective:  
  
Visit Vitals /69 Pulse 73 Temp 99.2 °F (37.3 °C) Resp 18 Ht 5' 2\" (1.575 m) Wt 83.5 kg (184 lb) LMP 2010 SpO2 98% BMI 33.65 kg/m² Temp (24hrs), Av.2 °F (37.3 °C), Min:99.2 °F (37.3 °C), Max:99.2 °F (37.3 °C) 
  
Oxygen Therapy O2 Sat (%): 98 % (03/17/21 2359) Pulse via Oximetry: 74 beats per minute (03/17/21 2359) O2 Device: Nasal cannula (03/17/21 2227) O2 Flow Rate (L/min): 6 l/min (03/17/21 2227) Physical Exam: 
General:          Alert, cooperative, no distress, appears stated age. Head:               Normocephalic, without obvious abnormality, atraumatic. Nose:               Nares normal. No drainage or sinus tenderness. Lungs:             Coarse breath sounds bilaterally  
heart:               Regular rate and rhythm,  no murmur, rub or gallop. Abdomen:        Soft, non-tender. Not distended. Bowel sounds normal.  
Extremities:     No cyanosis. No edema. No clubbing Skin:                Texture, turgor normal. No rashes or lesions. Not Jaundiced Neurologic:      Alert and oriented x 3, no focal deficits Data Review:  
Recent Results Recent Results (from the past 24 hour(s)) TYPE & SCREEN  
  Collection Time: 03/17/21 10:42 PM  
Result Value Ref Range  
  Crossmatch Expiration 03/20/2021,2359    
  ABO/Rh(D) O POSITIVE    
  Antibody screen NEG    
CULTURE, BLOOD  
  Collection Time: 03/17/21 10:44 PM  
  Specimen: Blood Result Value Ref Range  
  Special Requests: RIGHT Antecubital 
     
  Culture result: PENDING    
CBC WITH AUTOMATED DIFF  
  Collection Time: 03/17/21 10:44 PM  
Result Value Ref Range  
  WBC 3.8 (L) 4.3 - 11.1 K/uL  
  RBC 4.78 4.05 - 5.2 M/uL  
  HGB 13.8 11.7 - 15.4 g/dL  
  HCT 42.5 35.8 - 46.3 %  
  MCV 88.9 79.6 - 97.8 FL  
  MCH 28.9 26.1 - 32.9 PG  
  MCHC 32.5 31.4 - 35.0 g/dL  
  RDW 12.9 11.9 - 14.6 %  
  PLATELET 382 855 - 612 K/uL  
  MPV 9.4 9.4 - 12.3 FL  
  ABSOLUTE NRBC 0.00 0.0 - 0.2 K/uL  
  DF AUTOMATED    
  NEUTROPHILS 77 43 - 78 %  
  LYMPHOCYTES 12 (L) 13 - 44 %  
  MONOCYTES 9 4.0 - 12.0 %  
  EOSINOPHILS 0 (L) 0.5 - 7.8 %  
  BASOPHILS 0 0.0 - 2.0 %  
  IMMATURE GRANULOCYTES 1 0.0 - 5.0 %  
  ABS. NEUTROPHILS 2.9 1.7 - 8.2 K/UL  
  ABS. LYMPHOCYTES 0.5 0.5 - 4.6 K/UL  
  ABS. MONOCYTES 0.4 0.1 - 1.3 K/UL  
  ABS. EOSINOPHILS 0.0 0.0 - 0.8 K/UL  
  ABS. BASOPHILS 0.0 0.0 - 0.2 K/UL  
  ABS. IMM. GRANS. 0.1 0.0 - 0.5 K/UL  
MAGNESIUM  
  Collection Time: 03/17/21 10:44 PM  
Result Value Ref Range  
  Magnesium 2.5 (H) 1.8 - 2.4 mg/dL  
METABOLIC PANEL, COMPREHENSIVE  
  Collection Time: 03/17/21 10:44 PM  
Result Value Ref Range  
  Sodium 134 (L) 136 - 145 mmol/L  
  Potassium 4.2 3.5 - 5.1 mmol/L  
  Chloride 97 (L) 98 - 107 mmol/L  
  CO2 28 21 - 32 mmol/L  
  Anion gap 9 7 - 16 mmol/L  
  Glucose 103 (H) 65 - 100 mg/dL  
  BUN 27 (H) 6 - 23 MG/DL  
  Creatinine 1.04 (H) 0.6 - 1.0 MG/DL  
  GFR est AA >60 >60 ml/min/1.73m2  
  GFR est non-AA 58 (L) >60 ml/min/1.73m2  
  Calcium 9.0 8.3 - 10.4 MG/DL  
  Bilirubin, total 0.4 0.2 - 1.1 MG/DL  
  ALT (SGPT) 71 (H) 12 - 65 U/L  
  AST (SGOT) 72 (H) 15 - 37 U/L  
  Alk. phosphatase 98 50 - 136 U/L  
  Protein, total 8.4 (H) 6.3 - 8.2 g/dL  
  Albumin 3.9 3.5 - 5.0 g/dL  
  Globulin 4.5 (H) 2.3 - 3.5 g/dL  
  A-G Ratio 0.9 (L) 1.2 - 3.5    
PROCALCITONIN  
  Collection Time: 03/17/21 10:44 PM  
Result Value Ref Range  
  Procalcitonin 0.13 ng/mL  
LACTIC ACID  
  Collection Time: 03/17/21 10:44 PM  
Result Value Ref Range  
  Lactic acid 1.2 0.4 - 2.0 MMOL/L  
EKG, 12 LEAD, INITIAL  
  Collection Time: 03/17/21 10:44 PM  
Result Value Ref Range  
  Ventricular Rate 82 BPM  
  Atrial Rate 82 BPM  
  P-R Interval 150 ms  
  QRS Duration 70 ms  
  Q-T Interval 346 ms  
  QTC Calculation (Bezet) 404 ms  
  Calculated P Axis 36 degrees  
  Calculated R Axis 0 degrees  
  Calculated T Axis 59 degrees  
  Diagnosis      
    !! AGE AND GENDER SPECIFIC ECG ANALYSIS !! 
Normal sinus rhythm 
Normal ECG 
No previous ECGs available 
   
  
  
Imaging /Procedures /Studies  
XR CHEST PORT  
Final Result  
Question interval development of mild infiltrate in the right lung  peripherally. The overlying scapula and breast tissue are limiting evaluation of this area.  
   
   
  
  
Assessment and Plan:  
  
    
Active Hospital Problems  
  Diagnosis Date Noted  COVID-19 03/18/2021  
  
  
Acute hypoxic respiratory failure requiring 6 L nasal cannula-secondary to COVID-19 pneumonia, high risk due to morbid obesity, high calcium score likely CAD Day 5 of COVID-19 diagnoses, with new asp failure,  will order remdesivir & convalescent plasma 
-Start Solu-Medrol 60 twice daily,Follow CT chest, ABG, patient's the window for cytokines going, if qualify for ARDS, consider pulse dose steroids 250 mg daily for 3 days, then taper  
-Procalcitonin less than 0.25, continue azithromycin for anti-inflammatory and prevention of secondary bacterial pneumonia 
  
Diarrhealikely secondary to COVID-19, gentle hydration's, keep euvolemia 
  
History of vitamin B12 deficiency, vitamin D deficiency, hyperlipidemia, hypothyroidism on chronic Synthroid, hypertension 
-resume home medications if indicated ,and monitor clinically while inpatient, currently stable co morbidities add to patient's case complexity 
  
  
  
DVT PPx: lovenox Code Status: full  
  
Anticipated discharge: 2-4 days, depending on patient's progression 
  
Signed By: Marylou Briceño DO   
  March 18, 2021

## 2021-03-18 NOTE — PROGRESS NOTES
TRANSFER - IN REPORT:    Verbal report received from [facility] RN(name) on Raza Sweet  being received from ED(unit) for routine progression of care      Report consisted of patients Situation, Background, Assessment and   Recommendations(SBAR). Information from the following report(s) SBAR, Kardex, ED Summary, MAR, Accordion and Recent Results was reviewed with the receiving nurse. Opportunity for questions and clarification was provided. Assessment completed upon patients arrival to unit and care assumed.

## 2021-03-18 NOTE — PROGRESS NOTES
Multiple RTs unable to obtain an ABG on this patient. Patient oxygen saturation 94%on 6L.  Suggest trying again in the am.

## 2021-03-18 NOTE — PROGRESS NOTES
Problem: Breathing Pattern - Ineffective  Goal: *Absence of hypoxia  Outcome: Progressing Towards Goal  Goal: *Use of effective breathing techniques  Outcome: Progressing Towards Goal  Goal: *PALLIATIVE CARE:  Alleviation of Dyspnea  Outcome: Progressing Towards Goal     Problem: Patient Education: Go to Patient Education Activity  Goal: Patient/Family Education  Outcome: Progressing Towards Goal     Problem: Falls - Risk of  Goal: *Absence of Falls  Description: Document Nikia Fall Risk and appropriate interventions in the flowsheet. Outcome: Progressing Towards Goal  Note: Fall Risk Interventions:            Medication Interventions: Teach patient to arise slowly                   Problem: Patient Education: Go to Patient Education Activity  Goal: Patient/Family Education  Outcome: Progressing Towards Goal     Problem: Risk for Spread of Infection  Goal: Prevent transmission of infectious organism to others  Description: Prevent the transmission of infectious organisms to other patients, staff members, and visitors.   Outcome: Progressing Towards Goal     Problem: Patient Education:  Go to Education Activity  Goal: Patient/Family Education  Outcome: Progressing Towards Goal

## 2021-03-18 NOTE — PROGRESS NOTES
Pt is a readmission. She was seen in the ED on 3-13-21 with compliant of body aches and Dx with COVID-19. Readmission assessment completed. CM spoke with pt via telephone (r/t COVID-19 precautions) to discuss d/c planning. A/O x4. Demographics verified. Pt resides alone in a one-story house with 2 steps to enter. She does not have any home DME. Prior to hospitalization pt was independent with ADLs and was not receiving any HH services. She is employed and has insurance with pharmacy benefits. Pt is currently requiring 6L O2 NC. She is receiving Remdesivir (day 15). She uses the [facility] pharmacy in [facility]. Pt admitted with Dx of Acute Hypoxemic Respiratory Failure due to COVID-19 and Dehydration. Pt is currently a R/O.  PT and OT evals have been ordered to assess for any DME/further skilled therapy needs. Current d/c plan is to return home and to work when medically stable. No immediate needs identified. CM will continue to follow and remain available if any needs arise. Care Management Interventions  PCP Verified by CM: Yes(Reed A. Lavonna Baumgarten, MD)  Mode of Transport at Discharge:  Other (see comment)(Family)  Transition of Care Consult (CM Consult): Discharge Planning  Physical Therapy Consult: Yes  Occupational Therapy Consult: Yes  Speech Therapy Consult: No  Current Support Network: Own Home, Lives Alone  Confirm Follow Up Transport: Family  The Patient and/or Patient Representative was Provided with a Choice of Provider and Agrees with the Discharge Plan?: Yes  Name of the Patient Representative Who was Provided with a Choice of Provider and Agrees with the Discharge Plan: Gretel Davis  Freedom of Choice List was Provided with Basic Dialogue that Supports the Patient's Individualized Plan of Care/Goals, Treatment Preferences and Shares the Quality Data Associated with the Providers?: Yes   Resource Information Provided?: No  Discharge Location  Discharge Placement: Home

## 2021-03-19 ENCOUNTER — APPOINTMENT (OUTPATIENT)
Dept: GENERAL RADIOLOGY | Age: 61
DRG: 177 | End: 2021-03-19
Attending: PHYSICIAN ASSISTANT
Payer: COMMERCIAL

## 2021-03-19 LAB
ALBUMIN SERPL-MCNC: 2.8 G/DL (ref 3.5–5)
ALBUMIN/GLOB SERPL: 0.8 {RATIO} (ref 1.2–3.5)
ALP SERPL-CCNC: 73 U/L (ref 50–136)
ALT SERPL-CCNC: 44 U/L (ref 12–65)
ANION GAP SERPL CALC-SCNC: 4 MMOL/L (ref 7–16)
AST SERPL-CCNC: 38 U/L (ref 15–37)
BASOPHILS # BLD: 0 K/UL (ref 0–0.2)
BASOPHILS NFR BLD: 0 % (ref 0–2)
BILIRUB SERPL-MCNC: 0.2 MG/DL (ref 0.2–1.1)
BLD PROD TYP BPU: NORMAL
BLOOD BANK CMNT PATIENT-IMP: NORMAL
BNP SERPL-MCNC: 113 PG/ML (ref 5–125)
BPU ID: NORMAL
BUN SERPL-MCNC: 18 MG/DL (ref 6–23)
CALCIUM SERPL-MCNC: 8.4 MG/DL (ref 8.3–10.4)
CHLORIDE SERPL-SCNC: 107 MMOL/L (ref 98–107)
CO2 SERPL-SCNC: 29 MMOL/L (ref 21–32)
CREAT SERPL-MCNC: 0.54 MG/DL (ref 0.6–1)
CRP SERPL HS-MCNC: 17.7 MG/L
D DIMER PPP FEU-MCNC: 0.41 UG/ML(FEU)
DIFFERENTIAL METHOD BLD: ABNORMAL
EOSINOPHIL # BLD: 0 K/UL (ref 0–0.8)
EOSINOPHIL NFR BLD: 0 % (ref 0.5–7.8)
ERYTHROCYTE [DISTWIDTH] IN BLOOD BY AUTOMATED COUNT: 13 % (ref 11.9–14.6)
FERRITIN SERPL-MCNC: 572 NG/ML (ref 8–388)
GLOBULIN SER CALC-MCNC: 3.6 G/DL (ref 2.3–3.5)
GLUCOSE SERPL-MCNC: 96 MG/DL (ref 65–100)
HCT VFR BLD AUTO: 35.1 % (ref 35.8–46.3)
HGB BLD-MCNC: 11.2 G/DL (ref 11.7–15.4)
IMM GRANULOCYTES # BLD AUTO: 0 K/UL (ref 0–0.5)
IMM GRANULOCYTES NFR BLD AUTO: 0 % (ref 0–5)
LYMPHOCYTES # BLD: 0.7 K/UL (ref 0.5–4.6)
LYMPHOCYTES NFR BLD: 14 % (ref 13–44)
MAGNESIUM SERPL-MCNC: 2 MG/DL (ref 1.8–2.4)
MCH RBC QN AUTO: 29 PG (ref 26.1–32.9)
MCHC RBC AUTO-ENTMCNC: 31.9 G/DL (ref 31.4–35)
MCV RBC AUTO: 90.9 FL (ref 79.6–97.8)
MONOCYTES # BLD: 0.4 K/UL (ref 0.1–1.3)
MONOCYTES NFR BLD: 9 % (ref 4–12)
NEUTS SEG # BLD: 3.8 K/UL (ref 1.7–8.2)
NEUTS SEG NFR BLD: 77 % (ref 43–78)
NRBC # BLD: 0 K/UL (ref 0–0.2)
PLATELET # BLD AUTO: 208 K/UL (ref 150–450)
PMV BLD AUTO: 9.5 FL (ref 9.4–12.3)
POTASSIUM SERPL-SCNC: 4.1 MMOL/L (ref 3.5–5.1)
PROCALCITONIN SERPL-MCNC: 0.05 NG/ML
PROT SERPL-MCNC: 6.4 G/DL (ref 6.3–8.2)
RBC # BLD AUTO: 3.86 M/UL (ref 4.05–5.2)
SODIUM SERPL-SCNC: 140 MMOL/L (ref 136–145)
STATUS OF UNIT,%ST: NORMAL
UNIT DIVISION, %UDIV: NORMAL
WBC # BLD AUTO: 5 K/UL (ref 4.3–11.1)

## 2021-03-19 PROCEDURE — 86141 C-REACTIVE PROTEIN HS: CPT

## 2021-03-19 PROCEDURE — 65270000029 HC RM PRIVATE

## 2021-03-19 PROCEDURE — 97110 THERAPEUTIC EXERCISES: CPT

## 2021-03-19 PROCEDURE — 94762 N-INVAS EAR/PLS OXIMTRY CONT: CPT

## 2021-03-19 PROCEDURE — 94640 AIRWAY INHALATION TREATMENT: CPT

## 2021-03-19 PROCEDURE — 97161 PT EVAL LOW COMPLEX 20 MIN: CPT

## 2021-03-19 PROCEDURE — 74011250637 HC RX REV CODE- 250/637: Performed by: INTERNAL MEDICINE

## 2021-03-19 PROCEDURE — 94760 N-INVAS EAR/PLS OXIMETRY 1: CPT

## 2021-03-19 PROCEDURE — 85379 FIBRIN DEGRADATION QUANT: CPT

## 2021-03-19 PROCEDURE — 83880 ASSAY OF NATRIURETIC PEPTIDE: CPT

## 2021-03-19 PROCEDURE — 82728 ASSAY OF FERRITIN: CPT

## 2021-03-19 PROCEDURE — 84145 PROCALCITONIN (PCT): CPT

## 2021-03-19 PROCEDURE — 74011000258 HC RX REV CODE- 258: Performed by: HOSPITALIST

## 2021-03-19 PROCEDURE — 74011250636 HC RX REV CODE- 250/636: Performed by: HOSPITALIST

## 2021-03-19 PROCEDURE — 85025 COMPLETE CBC W/AUTO DIFF WBC: CPT

## 2021-03-19 PROCEDURE — 74011250636 HC RX REV CODE- 250/636: Performed by: INTERNAL MEDICINE

## 2021-03-19 PROCEDURE — 77010033711 HC HIGH FLOW OXYGEN

## 2021-03-19 PROCEDURE — 80053 COMPREHEN METABOLIC PANEL: CPT

## 2021-03-19 PROCEDURE — 74011250637 HC RX REV CODE- 250/637: Performed by: FAMILY MEDICINE

## 2021-03-19 PROCEDURE — 83735 ASSAY OF MAGNESIUM: CPT

## 2021-03-19 PROCEDURE — 74011000250 HC RX REV CODE- 250: Performed by: HOSPITALIST

## 2021-03-19 PROCEDURE — 2709999900 HC NON-CHARGEABLE SUPPLY

## 2021-03-19 PROCEDURE — 74011250637 HC RX REV CODE- 250/637: Performed by: HOSPITALIST

## 2021-03-19 PROCEDURE — 71045 X-RAY EXAM CHEST 1 VIEW: CPT

## 2021-03-19 RX ORDER — FUROSEMIDE 10 MG/ML
40 INJECTION INTRAMUSCULAR; INTRAVENOUS ONCE
Status: COMPLETED | OUTPATIENT
Start: 2021-03-19 | End: 2021-03-19

## 2021-03-19 RX ORDER — DEXAMETHASONE 4 MG/1
6 TABLET ORAL DAILY
Status: COMPLETED | OUTPATIENT
Start: 2021-03-19 | End: 2021-03-28

## 2021-03-19 RX ORDER — BUDESONIDE AND FORMOTEROL FUMARATE DIHYDRATE 160; 4.5 UG/1; UG/1
2 AEROSOL RESPIRATORY (INHALATION)
Status: DISCONTINUED | OUTPATIENT
Start: 2021-03-19 | End: 2021-03-28 | Stop reason: HOSPADM

## 2021-03-19 RX ADMIN — POLYETHYLENE GLYCOL 3350 17 G: 17 POWDER, FOR SOLUTION ORAL at 10:03

## 2021-03-19 RX ADMIN — AZITHROMYCIN MONOHYDRATE 250 MG: 250 TABLET ORAL at 10:03

## 2021-03-19 RX ADMIN — LEVOTHYROXINE SODIUM 50 MCG: 0.05 TABLET ORAL at 10:03

## 2021-03-19 RX ADMIN — DEXAMETHASONE 6 MG: 4 TABLET ORAL at 10:11

## 2021-03-19 RX ADMIN — FAMOTIDINE 20 MG: 20 TABLET, FILM COATED ORAL at 10:03

## 2021-03-19 RX ADMIN — ALBUTEROL SULFATE 2 PUFF: 90 AEROSOL, METERED RESPIRATORY (INHALATION) at 15:48

## 2021-03-19 RX ADMIN — MELOXICAM 7.5 MG: 7.5 TABLET ORAL at 10:03

## 2021-03-19 RX ADMIN — FUROSEMIDE 40 MG: 10 INJECTION, SOLUTION INTRAMUSCULAR; INTRAVENOUS at 10:02

## 2021-03-19 RX ADMIN — ASPIRIN 81 MG: 81 TABLET ORAL at 10:03

## 2021-03-19 RX ADMIN — FAMOTIDINE 20 MG: 20 TABLET, FILM COATED ORAL at 17:10

## 2021-03-19 RX ADMIN — ROSUVASTATIN CALCIUM 20 MG: 20 TABLET, COATED ORAL at 21:49

## 2021-03-19 RX ADMIN — ALBUTEROL SULFATE 2 PUFF: 90 AEROSOL, METERED RESPIRATORY (INHALATION) at 03:39

## 2021-03-19 RX ADMIN — ENOXAPARIN SODIUM 40 MG: 40 INJECTION SUBCUTANEOUS at 10:02

## 2021-03-19 RX ADMIN — TIOTROPIUM BROMIDE INHALATION SPRAY 2 PUFF: 3.12 SPRAY, METERED RESPIRATORY (INHALATION) at 11:42

## 2021-03-19 RX ADMIN — ALBUTEROL SULFATE 2 PUFF: 90 AEROSOL, METERED RESPIRATORY (INHALATION) at 00:08

## 2021-03-19 RX ADMIN — ALBUTEROL SULFATE 2 PUFF: 90 AEROSOL, METERED RESPIRATORY (INHALATION) at 08:37

## 2021-03-19 RX ADMIN — Medication 2 SPRAY: at 04:38

## 2021-03-19 RX ADMIN — ALBUTEROL SULFATE 2 PUFF: 90 AEROSOL, METERED RESPIRATORY (INHALATION) at 20:00

## 2021-03-19 RX ADMIN — CHOLECALCIFEROL TAB 125 MCG (5000 UNIT) 5000 UNITS: 125 TAB at 10:03

## 2021-03-19 RX ADMIN — FUROSEMIDE 40 MG: 10 INJECTION, SOLUTION INTRAMUSCULAR; INTRAVENOUS at 17:10

## 2021-03-19 RX ADMIN — BUSPIRONE HYDROCHLORIDE 15 MG: 10 TABLET ORAL at 10:11

## 2021-03-19 RX ADMIN — BUDESONIDE AND FORMOTEROL FUMARATE DIHYDRATE 2 PUFF: 160; 4.5 AEROSOL RESPIRATORY (INHALATION) at 11:42

## 2021-03-19 RX ADMIN — ALBUTEROL SULFATE 2 PUFF: 90 AEROSOL, METERED RESPIRATORY (INHALATION) at 11:43

## 2021-03-19 RX ADMIN — Medication 10 ML: at 21:49

## 2021-03-19 RX ADMIN — Medication 10 ML: at 17:10

## 2021-03-19 RX ADMIN — REMDESIVIR 100 MG: 100 INJECTION, POWDER, LYOPHILIZED, FOR SOLUTION INTRAVENOUS at 10:14

## 2021-03-19 RX ADMIN — ENOXAPARIN SODIUM 40 MG: 40 INJECTION SUBCUTANEOUS at 20:25

## 2021-03-19 NOTE — PROGRESS NOTES
Problem: Breathing Pattern - Ineffective  Goal: *Absence of hypoxia  Outcome: Progressing Towards Goal  Goal: *Use of effective breathing techniques  Outcome: Progressing Towards Goal  Goal: *PALLIATIVE CARE:  Alleviation of Dyspnea  Outcome: Progressing Towards Goal     Problem: Patient Education: Go to Patient Education Activity  Goal: Patient/Family Education  Outcome: Progressing Towards Goal     Problem: Falls - Risk of  Goal: *Absence of Falls  Description: Document Nikia Fall Risk and appropriate interventions in the flowsheet. Outcome: Progressing Towards Goal  Note: Fall Risk Interventions:            Medication Interventions: Teach patient to arise slowly                   Problem: Patient Education: Go to Patient Education Activity  Goal: Patient/Family Education  Outcome: Progressing Towards Goal     Problem: Risk for Spread of Infection  Goal: Prevent transmission of infectious organism to others  Description: Prevent the transmission of infectious organisms to other patients, staff members, and visitors. Outcome: Progressing Towards Goal     Problem: Patient Education:  Go to Education Activity  Goal: Patient/Family Education  Outcome: Progressing Towards Goal     Problem: Patient Education: Go to Patient Education Activity  Goal: Patient/Family Education  Outcome: Progressing Towards Goal     Problem: Patient Education: Go to Patient Education Activity  Goal: Patient/Family Education  Outcome: Progressing Towards Goal   Pt in no distress, no C/O pain/N/V/Diarrhea, tolerating meds/diet, oxygen intact, IV's intact, pt anxious, anxiety med given, MD has assessed pt.

## 2021-03-19 NOTE — PROGRESS NOTES
ACUTE PHYSICAL THERAPY GOALS:  (Developed with and agreed upon by patient and/or caregiver. )  LTG:  (1.)Ms. Ayah Irwin will move from supine to sit and sit to supine, scoot up and down and roll side to side in flat bed with INDEPENDENT within 7 day(s). (2.)Ms. Ayah Irwin will ambulate with modified INDEPENDENCE for 75+ feet with the least restrictive/no device within 7 day(s). (3.)Ms. Ayah Irwin will perform standing static and dynamic balance activities x 8 minutes with SUPERVISION to improve safety within 7 day(s). PHYSICAL THERAPY ASSESSMENT: Initial Assessment and Daily Note PT Treatment Day # 1      Elmon Leventhal is a 62 y.o. female   PRIMARY DIAGNOSIS: Acute respiratory [facility]  COVID-19 [U07.1]       Reason for Referral:     ICD-10: Treatment Diagnosis: Other abnormalities of gait and mobility (R26.89)  INPATIENT: Payor: BLUE CROSS / [place]: [place] [facility] / Product Type: PPO /     ASSESSMENT:     REHAB RECOMMENDATIONS:   Recommendation to date pending progress:  Setting:   tbd pending progress, HHPT?? Equipment:    To Be Determined     PRIOR LEVEL OF FUNCTION:  (Prior to Hospitalization) INITIAL/CURRENT LEVEL OF FUNCTION:  (Most Recently Demonstrated)   Bed Mobility:   Independent  Sit to Stand:   Independent  Transfers:   Independent  Gait/Mobility:   Independent Bed Mobility:   Modified Independent  Sit to Stand:   Independent  Transfers:   Standby Assistance  Gait/Mobility:   Standby Assistance     ASSESSMENT:  Ms. Ayah Irwin  is a 62year old female with known COVID-19 admitted with SOB and hypoxia. At baseline she lives alone and is independent with all ADLs, IADLs, mobility, and working. Today she is on Airvo and desats with minimal exertion. Mobility limited by low activity tolerance. Ms. Ayah Irwin would benefit from skilled physical therapy (medically necessary) to address her deficits and maximize her function.         SUBJECTIVE:   Ms. Ayah Irwin states, \"I am hot. \"    SOCIAL HISTORY/LIVING ENVIRONMENT: At baseline she lives alone and is independent with all ADLs, IADLs, mobility, and working.    Home Environment: Private residence  One/Two Story Residence: One story  Living Alone: No  Support Systems: Family member(s)  OBJECTIVE:     PAIN: VITAL SIGNS: LINES/DRAINS:   Pre Treatment: Pain Screen  Pain Scale 1: Numeric (0 - 10)  Pain Intensity 1: 0  Post Treatment: 0 SpO2 low 80's with activity, upper 80's with rest. continuous O2 monitor  O2 Device: Heated, Hi flow nasal cannula     GROSS EVALUATION:  B LE's Within Functional Limits Abnormal/ Functional Abnormal/ Non-Functional (see comments) Not Tested Comments:   AROM [x] [] [] []    PROM [] [] [] []    Strength [x] [] [] []    Balance [x] [] [] []    Posture [x] [] [] []    Sensation [] [] [] []    Coordination [] [] [] []    Tone [] [] [] []    Edema [] [] [] []    Activity Tolerance [] [] [x] []     [] [] [] []      COGNITION/  PERCEPTION: Intact Impaired   (see comments) Comments:   Orientation [x] []    Vision [x] []    Hearing [x] []    Command Following [x] []    Safety Awareness [x] []     [] []      MOBILITY: I Mod I S SBA CGA Min Mod Max Total  NT x2 Comments:   Bed Mobility    Rolling [] [x] [] [] [] [] [] [] [] [] []    Supine to Sit [] [x] [] [] [] [] [] [] [] [] []    Scooting [] [x] [] [] [] [] [] [] [] [] []    Sit to Supine [] [] [] [] [] [] [] [] [] [x] []    Transfers    Sit to Stand [x] [] [] [] [] [] [] [] [] [] []    Bed to Chair [] [] [] [x] [] [] [] [] [] [] []    Stand to Sit [x] [] [] [] [] [] [] [] [] [] []    I=Independent, Mod I=Modified Independent, S=Supervision, SBA=Standby Assistance, CGA=Contact Guard Assistance,   Min=Minimal Assistance, Mod=Moderate Assistance, Max=Maximal Assistance, Total=Total Assistance, NT=Not Tested  GAIT: I Mod I S SBA CGA Min Mod Max Total  NT x2 Comments:   Level of Assistance [] [] [] [x] [] [] [] [] [] [] []    Distance 4    DME None    Gait Quality WNL    Weightbearing Status N/A     I=Independent, Mod I=Modified Independent, S=Supervision, SBA=Standby Assistance, CGA=Contact Guard Assistance,   Min=Minimal Assistance, Mod=Moderate Assistance, Max=Maximal Assistance, Total=Total Assistance, NT=Not [facility]       How much difficulty does the patient currently have. .. Unable A Lot A Little None   1. Turning over in bed (including adjusting bedclothes, sheets and blankets)? [] 1   [] 2   [] 3   [x] 4   2. Sitting down on and standing up from a chair with arms ( e.g., wheelchair, bedside commode, etc.)   [] 1   [] 2   [] 3   [x] 4   3. Moving from lying on back to sitting on the side of the bed? [] 1   [] 2   [] 3   [x] 4   How much help from another person does the patient currently need. .. Total A Lot A Little None   4. Moving to and from a bed to a chair (including a wheelchair)? [] 1   [] 2   [x] 3   [] 4   5. Need to walk in hospital room? [] 1   [] 2   [x] 3   [] 4   6. Climbing 3-5 steps with a railing? [] 1   [] 2   [x] 3   [] 4   © 2007, Trustees of [facility] [employer], under license to [employer]. All rights reserved     Score:  Initial: 21 Most Recent: X (Date: -- )    Interpretation of Tool:  Represents activities that are increasingly more difficult (i.e. Bed mobility, Transfers, Gait). PLAN:   FREQUENCY/DURATION: PT Plan of Care: 3 times/week for duration of hospital stay or until stated goals are met, whichever comes first.    PROBLEM LIST:   (Skilled intervention is medically necessary to address:)  1. Decreased Activity Tolerance  2. Decreased Gait Ability  3. Decreased Transfer Abilities   INTERVENTIONS PLANNED:   (Benefits and precautions of physical therapy have been discussed with the patient.)  1. Therapeutic Activity  2. Therapeutic Exercise/HEP  3. Neuromuscular Re-education  4. Gait Training  5. Manual Therapy  6.  Education     TREATMENT: EVALUATION: Low Complexity : (Untimed Charge)    TREATMENT:   ($$ Therapeutic Exercises: 8-22 mins    )  Therapeutic Exercise (8 minutes Minutes): Therapeutic exercises noted below to improve functional activity tolerance, strength and mobility. Educated patient in position change and importance of prone lying.   TREATMENT GRID:      DATE: 3/19/21       Ambulation        Hip Flexion x10 AB       Long Arc Quads x10 AB       Hip ab/ad        Heel Raises        Toe Raises                                 Key:  A=active, AA=active assisted, P=passive, B=bilaterally, R=right, L=left   DF=dorsiflexion, PF=plantarflexion    AFTER TREATMENT POSITION/PRECAUTIONS:  Chair, Needs within reach and RN notified    INTERDISCIPLINARY COLLABORATION:  RN/PCT and PT/PTA    TOTAL TREATMENT DURATION:  PT Patient Time In/Time Out  Time In: 1352  Time Out: [address] [facility] [address], PT, DPT

## 2021-03-19 NOTE — PROGRESS NOTES
[facility]ist Note     Admit Date:  3/17/2021 10:20 PM   Name:  Klever Contreras   Age:  62 y.o.  :  1960   MRN:  [id number]   PCP:  Dharmesh Levy MD  Treatment Team: Attending Provider: Thao Cole MD; Care Manager: [facility] [place]; Staff Nurse: Magy Rodrigues, RN; Utilization Review: Merlinda Lover, RN; Physical Therapist: Ross Jasso, PT, DPT; Occupational Therapist: Pollo Greenberg, OTR/L    HPI/Subjective:     Chief Complaint : Positive For Covid-19    Subjective:  Patient 81-year-old female with a PMH of HTN, HL, Vit D deficiency who was diagnosed with COVID-19 on  in the emergency room, discharged home with a portable oximeter, who presented again on 3/17 with increasing SOB and hypoxia. Patient found oxygen saturation to be in the low 80s, with symptoms of shortness of breath, EMS was called, on arrival patient saturation 79, requires 4 L nasal cannula, patient also complaining of fevers, chills, fatigue, body ache, nausea, diarrhea, lower back pain, nonproductive cough. She was admitted for further care. 3/19: Patient's oxygenation worsened to high flow 30s per minute. She is anxious regarding her prognosis. She is asking for anxiety medication. I explained current management in detail. Denies CP, denies LE edema. She continues to have NP cough, exacerbated by deep inspirations. Assessment and Plan:     Principal Problem:    Acute respiratory failure due to COVID-19 -Airvo  - on admit requiring 6L via NC, now up to 10L. Low threshold for placing on airvo. Switch to Decadron, continue remdesivir; she received convalescent plasma yesterday.   Antibiotics.  -Incentive spirometry, inhalers added  - cont to monitor renal function, LFTs  - repeat CXR in AM given increasing O2 requirements    Active Problems:    Essential hypertension, benign - stable on lisinopril      Hypothyroidism, acquired, autoimmune - cont Synthroid      Hyperlipidemia with target low density lipoprotein (LDL) cholesterol less than 130 mg/dL -Cont crestor. Vitamin D deficiency - cont supplementation    DC planning/Dispo:  Await less O2 need. Diet:  DIET REGULAR  DVT ppx:  Lovenox  Care d/w patient, care team, Dr. Marianne Pal    Signed:  Lynda Alejandro MD        Objective:     Patient Vitals for the past 24 hrs:   Temp Pulse Resp BP SpO2   03/19/21 1143     94 %   03/19/21 1102 98.4 °F (36.9 °C) 77  100/62 96 %   03/19/21 0837     90 %   03/19/21 0758 98.4 °F (36.9 °C) 87  (!) 110/90 91 %   03/19/21 0357 99 °F (37.2 °C) 78 22 109/66 95 %   03/19/21 0339     95 %   03/19/21 0233     91 %   03/19/21 0008     92 %   03/18/21 2239 99.5 °F (37.5 °C) 79 20 114/77 97 %   03/18/21 2140 98.1 °F (36.7 °C) 75 20 115/72 91 %   03/18/21 2118     91 %   03/18/21 2040 98.3 °F (36.8 °C) 77 20 102/65 91 %   03/18/21 2025 98.2 °F (36.8 °C) 78 20 103/75 91 %   03/18/21 1920 98.2 °F (36.8 °C) 84 18 103/70 90 %   03/18/21 1630     90 %   03/18/21 1617 97.8 °F (36.6 °C) 77 18 103/68 90 %     Oxygen Therapy  O2 Sat (%): 94 % (03/19/21 1143)  Pulse via Oximetry: 64 beats per minute (03/19/21 1143)  O2 Device: Heated; Hi flow nasal cannula (03/19/21 1143)  Skin Assessment: Clean, dry, & intact (03/19/21 1143)  O2 Flow Rate (L/min): 30 l/min (03/19/21 1143)  O2 Temperature: 87.8 °F (31 °C) (03/19/21 1143)  FIO2 (%): 35 % (03/19/21 1143)  Intake and Output:  Date 03/18/21 0700 - 03/19/21 0659 03/19/21 0700 - 03/20/21 0659   Shift 8067-6809 0126-9111 24 Hour Total 0393-1000 1750-7541 24 Hour Total   INTAKE   P.O. 240 240 480        P. O. 240 240 480      I. V.(mL/kg/hr)  1980(1.9) 1980(1)        I.V.  1980 1980      Blood  166.3 166.3        Volume (TRANSFUSE CONVALESCENT PLASMA)  166.3 166.3      Shift Total(mL/kg) 240(2.9) 2386. 3(28.2) 2626.3(31)      OUTPUT   Urine(mL/kg/hr)           Urine Occurrence(s) 1 x 2 x 3 x      Stool           Stool Occurrence(s)  0 x 0 x      Shift Total(mL/kg)          2386. 3 2626. 3      Weight (kg) 82.4 84.6 84.6 84.6 84.6 84.6         Physical Exam:    General:   Well nourished. No overt distress  HEENT:  PERRLA  CV:  +S1/S2, RRR. No M/R/G. No edema noted b/l LEs  Lungs:  + bibasilar crackles noted  Abdomen: +BS, soft, nontender, nondistended. Extremities: Warm and dry. No cyanosis   Skin:  No rashes or cyanosis noted  Neuro:  No gross focal deficits. Psych:  Non-focal. Alert and oriented to person, time, place and situation    Data Reviewed:  I have reviewed all labs, meds, and studies from the last 24 hours:  Recent Results (from the past 24 hour(s))   CBC WITH AUTOMATED DIFF    Collection Time: 03/19/21  7:31 AM   Result Value Ref Range    WBC 5.0 4.3 - 11.1 K/uL    RBC 3.86 (L) 4.05 - 5.2 M/uL    HGB 11.2 (L) 11.7 - 15.4 g/dL    HCT 35.1 (L) 35.8 - 46.3 %    MCV 90.9 79.6 - 97.8 FL    MCH 29.0 26.1 - 32.9 PG    MCHC 31.9 31.4 - 35.0 g/dL    RDW 13.0 11.9 - 14.6 %    PLATELET 240 269 - 445 K/uL    MPV 9.5 9.4 - 12.3 FL    ABSOLUTE NRBC 0.00 0.0 - 0.2 K/uL    DF AUTOMATED      NEUTROPHILS 77 43 - 78 %    LYMPHOCYTES 14 13 - 44 %    MONOCYTES 9 4.0 - 12.0 %    EOSINOPHILS 0 (L) 0.5 - 7.8 %    BASOPHILS 0 0.0 - 2.0 %    IMMATURE GRANULOCYTES 0 0.0 - 5.0 %    ABS. NEUTROPHILS 3.8 1.7 - 8.2 K/UL    ABS. LYMPHOCYTES 0.7 0.5 - 4.6 K/UL    ABS. MONOCYTES 0.4 0.1 - 1.3 K/UL    ABS. EOSINOPHILS 0.0 0.0 - 0.8 K/UL    ABS. BASOPHILS 0.0 0.0 - 0.2 K/UL    ABS. IMM.  GRANS. 0.0 0.0 - 0.5 K/UL   METABOLIC PANEL, COMPREHENSIVE    Collection Time: 03/19/21  7:31 AM   Result Value Ref Range    Sodium 140 136 - 145 mmol/L    Potassium 4.1 3.5 - 5.1 mmol/L    Chloride 107 98 - 107 mmol/L    CO2 29 21 - 32 mmol/L    Anion gap 4 (L) 7 - 16 mmol/L    Glucose 96 65 - 100 mg/dL    BUN 18 6 - 23 MG/DL    Creatinine 0.54 (L) 0.6 - 1.0 MG/DL    GFR est AA >60 >60 ml/min/1.73m2    GFR est non-AA >60 >60 ml/min/1.73m2    Calcium 8.4 8.3 - 10.4 MG/DL    Bilirubin, total 0.2 0.2 - 1.1 MG/DL    ALT (SGPT) 44 12 - 65 U/L    AST (SGOT) 38 (H) 15 - 37 U/L    Alk.  phosphatase 73 50 - 136 U/L    Protein, total 6.4 6.3 - 8.2 g/dL    Albumin 2.8 (L) 3.5 - 5.0 g/dL    Globulin 3.6 (H) 2.3 - 3.5 g/dL    A-G Ratio 0.8 (L) 1.2 - 3.5     D DIMER    Collection Time: 03/19/21  7:31 AM   Result Value Ref Range    D DIMER 0.41 <0.56 ug/ml(FEU)   CRP, HIGH SENSITIVITY    Collection Time: 03/19/21  7:31 AM   Result Value Ref Range    CRP, High sensitivity 17.7 mg/L   PROCALCITONIN    Collection Time: 03/19/21  7:31 AM   Result Value Ref Range    Procalcitonin 0.05 ng/mL   FERRITIN    Collection Time: 03/19/21  7:31 AM   Result Value Ref Range    Ferritin 572 (H) 8 - 388 NG/ML   MAGNESIUM    Collection Time: 03/19/21  7:31 AM   Result Value Ref Range    Magnesium 2.0 1.8 - 2.4 mg/dL   NT-PRO BNP    Collection Time: 03/19/21  7:31 AM   Result Value Ref Range    NT pro- 5 - 125 PG/ML       Current Meds:  Current Facility-Administered Medications   Medication Dose Route Frequency    sodium chloride (OCEAN) 0.65 % nasal squeeze bottle 2 Spray  2 Spray Both Nostrils Q2H PRN    busPIRone (BUSPAR) tablet 15 mg  15 mg Oral BID PRN    budesonide-formoteroL (SYMBICORT) 160-4.5 mcg/actuation HFA inhaler 2 Puff  2 Puff Inhalation BID RT    tiotropium bromide (SPIRIVA RESPIMAT) 2.5 mcg /actuation  2 Puff Inhalation DAILY    furosemide (LASIX) injection 40 mg  40 mg IntraVENous ONCE    dexAMETHasone (DECADRON) tablet 6 mg  6 mg Oral DAILY    aspirin delayed-release tablet 81 mg  81 mg Oral DAILY    levothyroxine (SYNTHROID) tablet 50 mcg  50 mcg Oral ACB    baclofen (LIORESAL) tablet 5 mg  5 mg Oral Q12H PRN    [Held by provider] lisinopriL (PRINIVIL, ZESTRIL) tablet 20 mg  20 mg Oral DAILY    rosuvastatin (CRESTOR) tablet 20 mg  20 mg Oral QHS    sodium chloride (NS) flush 5-40 mL  5-40 mL IntraVENous Q8H    sodium chloride (NS) flush 5-40 mL  5-40 mL IntraVENous PRN    acetaminophen (TYLENOL) tablet 650 mg  650 mg Oral Q6H PRN    Or    acetaminophen (TYLENOL) suppository 650 mg  650 mg Rectal Q6H PRN    polyethylene glycol (MIRALAX) packet 17 g  17 g Oral DAILY    senna (SENOKOT) tablet 8.6 mg  1 Tab Oral DAILY PRN    bisacodyL (DULCOLAX) suppository 10 mg  10 mg Rectal DAILY PRN    ondansetron (ZOFRAN) injection 4 mg  4 mg IntraVENous Q6H PRN    famotidine (PEPCID) tablet 20 mg  20 mg Oral BID    enoxaparin (LOVENOX) injection 40 mg  40 mg SubCUTAneous Q12H    guaiFENesin-dextromethorphan (ROBITUSSIN DM) 100-10 mg/5 mL syrup 5 mL  5 mL Oral Q4H PRN    azithromycin (ZITHROMAX) tablet 250 mg  250 mg Oral DAILY    cholecalciferol (VITAMIN D3) (5000 Units/125 mcg) tablet 5,000 Units  5,000 Units Oral DAILY    0.9% sodium chloride infusion 250 mL  250 mL IntraVENous PRN    remdesivir 100 mg in 0.9% sodium chloride 250 mL IVPB  100 mg IntraVENous Q24H    albuterol (PROVENTIL HFA, VENTOLIN HFA, PROAIR HFA) inhaler 2 Puff  2 Puff Inhalation Q4H RT       Other Studies:  No results found for this visit on 03/17/21. Xr Chest Sngl V    Result Date: 3/19/2021  EXAM: XR CHEST SNGL V HISTORY: eval COVID PNA, increasing O2 demand. TECHNIQUE: A single frontal view of the chest was submitted. COMPARISON: 3/17/2021 FINDINGS: There is been interval increase in the bilateral lung infiltrates. This is most noted in the left lower lung. There is no obvious pneumothorax. No other significant interval changes are seen. .     Findings as described above.        All Micro Results     Procedure Component Value Units Date/Time    CULTURE, BLOOD [[id number]] Collected: 03/17/21 2245    Order Status: Completed Specimen: Blood Updated: 03/19/21 1212     Special Requests: --        RIGHT  Antecubital       Culture result: NO GROWTH 1 DAY       SARS-COV-2, PCR [[id number]]  (Abnormal) Collected: 03/18/21 0110    Order Status: Completed Specimen: Nasopharyngeal Updated: 03/18/21 1048     Specimen source Nasopharyngeal        SARS-CoV-2 Detected        Comment:      The specimen is POSITIVE for SARS-CoV-2, the novel coronavirus associated with COVID-19.       This test has been authorized by the FDA under an Emergency Use Authorization (EUA) for use by authorized laboratories.        Fact sheet for Healthcare Providers: https://www.fda.gov/media/999721/download  Fact sheet for Patients: https://fda.gov/media/992319/download       Methodology: RT-PCR  RESULTS VERIFIED, PHONED TO AND READ BACK BY  CAMMIE INIGUEZ ON 3/18/21 @1047,          CULTURE, BLOOD [[id number]] Collected: 03/17/21 1799    Order Status: Canceled Specimen: Blood

## 2021-03-19 NOTE — ADT AUTH CERT NOTES
[facility] 
  
FACILITY NPI :[id number] FACILITY TAX ID :  
  
 [address] [facility] [address]  
[phone] 
  
  
   
Patient Name :Shamir Tapia  : 1960 (60 yrs) MRN : [id number] 
  
Patient Mailing Address [address] [place] [address] [41] , [id number]      
  
  . 
  
   
Insurance Plan Payor: BLUE CROSS / Plan: [place] [employer] [place] / Product Type: PPO /  
  
Primary Coverage Subscriber ID : [id number] 
  
Secondary Coverage:  N/A 
  
Secondary Subscriber ID :   
   
Current Patient Class : INPATIENT Admit Date : 3/18/2021 
  
REQUESTED LEVEL OF CARE: INPATIENT [101] Diagnosis : COVID-19 
                    
  
ICD10 Code : HGQWM-62 [U07.1]   
Current Room and Bed 505/01 
  
Admitting and Attending Info: 
Admitting Provider : Jaun Severe, DO   NPI: [id number] Admitting Provider Phone. [phone] Admitting Provider Address: [place] [address] 
  
Attending Provider Justin Renteria [place] [address] Attending Provider Address:  [address] [place] [address] 
  
Attending Provider Phone: Attending phys phone: [phone] 
   
 
 
Utilization Reviews 
 
  
3/19 md note by Juan Aragon RN 
 
  
Review Entered Review Status 3/19/2021 15:05 In Primary  
  
Criteria Review Per attending Patient's oxygenation worsened to high flow 30s per minute.  She is anxious regarding her prognosis. Crys De Los Santos is asking for anxiety medication.  I explained current management in detail. Denies CP, denies LE edema.  She continues to have NP cough, exacerbated by deep inspirations. Acute respiratory failure due to COVID-19 -Airvo 
on admit requiring 6L via NC, now up to 10L. Low threshold for placing on airvo.  Switch to Decadron, continue remdesivir; she received convalescent plasma yesterday.  Antibiotics. Incentive spirometry, inhalers added 
cont to monitor renal function, LFTs 
repeat CXR in AM given increasing O2 requirements 
 General:   Well nourished.  No overt distress HEENT:  PERRLA 
CV:  +S1/S2, RRR.  No M/R/G.  No edema noted b/l LEs Lungs:  + bibasilar crackles noted Abdomen: +BS, soft, nontender, nondistended.   
Extremities:     Warm and dry.  No cyanosis Skin:  No rashes or cyanosis noted Neuro:  No gross focal deficits.   
Psych:  Non-focal. Alert and oriented to person, time, place and situation  
  
Viral Illness, Acute - Care Day 2 (3/19/2021) by Mitchel Christy RN 
 
  
Review Entered Review Status 3/19/2021 13:18 Completed  
  
Criteria Review Care Day: 2 Care Date: 3/19/2021 Level of Care: Inpatient Floor Guideline Day 2 Level Of Care (X) Floor 3/19/2021 13:18:27 EDT by Basim Mar   
  3/19 medical   
Clinical Status ( ) * Hypotension absent 3/19/2021 13:18:27 EDT by Basim Mar   
  99 77 100/62 22 95% 30l high flow o2 nc 
84.6 kg d dimer 0.41 gluc 96 ast 38 procal 0.05 crp 17.7   
(X) * No requirement for mechanical ventilation 3/19/2021 13:18:27 EDT by Kirk Gibbons ( ) * Oxygenation at baseline or improved 3/19/2021 13:18:27 EDT by Basim Mar   
  30 l high flow nc 
cxr interval increase in the bilateral lung infiltrates most noted in the left lower lung   
(X) * Mental status at baseline 3/19/2021 13:18:27 EDT by Mary Bautista Activity (X) Advance activity as tolerated 3/19/2021 13:18:27 EDT by Basim Mar   
  as tolerated  
elevate hob  
prone as tolerated Routes ( ) * Oral hydration   
(X) Oral or IV medications 3/19/2021 13:18:27 EDT by Jaime Romero   
  po decadron 6mg daily  
iv lasix 40 mg given x1   
(X) Usual diet 3/19/2021 13:18:27 EDT by Senia Barnard reg Interventions (X) Possible isolation 3/19/2021 13:18:27 EDT by Jaime Romero   
  droplet plus isol (X) Pulse oximetry 3/19/2021 13:18:27 EDT by Jaime Romero   
  cont   
(X) Possible oxygen 3/19/2021 13:18:27 EDT by Jaime Helms   
  30 l high flow o2 nc Medications (X) Possible antiviral medication 3/19/2021 13:18:27 EDT by Jaime Helms   
  iv remdesivir 100 mg q24h (X) Possible antibiotic (eg, for bacterial coinfection or superinfection) 3/19/2021 13:18:27 EDT by Jaime Helms   
  po zithromax 250 mg q24h * Milestone Additional Notes 3/19/21 LOCA IP MEDICAL Albuterol 2 puffs q4h Lovenox sc 40 mg q12h    
  
Per attending   
  
  
  
3/18 covid results by Merlinda Lover, RN 
 
  
Review Entered Review Status 3/19/2021 10:44 In Primary  
  
Criteria Review Is the illness suspected to be related to the Coronavirus (COVID-19)? Yes, Has the member been tested for the COVID-19? Yes If Yes, what are the results of the COVID-19? Positive, What is the severity of the members condition (i.e. Isolation, Ventilator use)? Droplet plus isol High flow o2 iv remdesivir iv decadron po abx convalescent plasma  
  
           
Date: 3/18/2021 Department: [facility] Released By:  (auto-released) Authorizing: Basia Appiah DO Specimen Information: Nasopharyngeal  
     
Component Value Flag Ref Range Units Status Specimen source Nasopharyngeal        Final  
SARS-CoV-2 Detected  Abnormal   NOTD   Final  
Comment:  
    
The specimen is POSITIVE for SARS-CoV-2, the novel coronavirus associated with COVID-19.   
   
  
Viral Illness, Acute - Care Day 1 (3/18/2021) by Merlinda Lover, RN 
 
  
Review Entered Review Status 3/19/2021 10:37 Completed   
Criteria Review Care Day: 1 Care Date: 3/18/2021 Level of Care: Inpatient Floor Guideline Day 1 Level Of Care (X) ICU [J] or floor 3/19/2021 10:37:09 EDT by Rach Loja   
  3/18 medical   
Clinical Status   
(X) * Clinical Indications met [K] 3/19/2021 10:37:09 EDT by Rach Loja   
  ed via ems + covid sat low o2 pt reports fever on and off chills fatigue body aches 99.2 85 24 124/79 79% ra 5/10 pain lower back  6l nc (X) Possible Hypoxemia 3/19/2021 10:37:09 EDT by Rach Loja   
  3/17 79% ra 6l nc 
3/18 88% 6l incrased high flow 10l nc Activity (X) Activity as tolerated 3/19/2021 10:37:09 EDT by Rach Loja   
  as tolerated  
elevate hob  
prone positioning as tolerated Routes   
(X) Oral or IV hydration 3/19/2021 10:37:09 EDT by Denise Slaughter ivf @ 100 (X) Parenteral or oral medications 3/19/2021 10:37:09 EDT by Rach Loja   
  iv convalescent plasma given x1 
iv zofran 4mg q6h prn given x1  
po decadron 6mg daily po vit d 5000 units daily (X) Liquid or usual diet 3/19/2021 10:37:09 EDT by Denise Slaughter reg Interventions (X) Possible isolation [G]   
3/19/2021 10:37:09 EDT by Rach Loja   
  droplet plus isol (X) CBC with differential, chemistries, renal and hepatic function testing, C-reactive protein, coagulation panel 3/19/2021 10:37:09 EDT by Rach Loja   
  3/17 na 134 cl 97 bun 27 cr 1.04 alt 71 ast 72  
3/18 procal 0.13 (X) PCR for viral pathogens 3/19/2021 10:37:09 EDT by Rach Loja   
  3/18 + sars cov (X) ABG or oximetry 3/19/2021 10:37:09 EDT by Rach Loja   
  cont pulse ox (X) Possible oxygen 3/19/2021 10:37:09 EDT by Rach Loja   
  increased to 10 l high flow nc (X) Possible chest x-ray 3/19/2021 10:37:09 EDT by Denise Slaughter cxr questionable interval development of mild infiltrate right lung peripherally  
ct scan chest interval development bilat peripherally oriented groundglass lung infiltrates Medications (X) Possible antiviral medication 3/19/2021 10:37:09 EDT by Zeenat Cruz   
  iv remdesivir 200 mg given x1 iv remdesivir 100 mg q24h (X) Possible antibiotic (eg, for bacterial coinfection or superinfection) 3/19/2021 10:37:09 EDT by Zeenat Cruz   
  po zithromax 250 mg daily * Milestone Additional Notes 3/18/21 LOC IP MEDICAL Ed tx 6l nc iv decadron 10 mg given x1 ivf @ 100 Pm hx hashimoto disease htn hyperlipidemia Covid 19 3/13/21 Per attending General:          Alert, cooperative, no distress, appears stated age.     
Head:               Normocephalic, without obvious abnormality, atraumatic. Nose:               Nares normal. No drainage or sinus tenderness. Lungs:             Coarse breath sounds bilaterally   
heart:               Regular rate and rhythm,  no murmur, rub or gallop. Abdomen:        Soft, non-tender. Not distended.  Bowel sounds normal.   
Extremities:     No cyanosis.  No edema. No clubbing Skin:                Texture, turgor normal. No rashes or lesions.  Not Jaundiced Neurologic:      Alert and oriented x 3, no focal deficits Acute hypoxic respiratory failure requiring 6 L nasal cannula-secondary to COVID-19 pneumonia, high risk due to morbid obesity, high calcium score likely CAD Day 5 of COVID-19 diagnoses, with new asp failure,  will order remdesivir & convalescent plasma Start Solu-Medrol 60 twice daily,Follow CT chest, ABG, patient's the window for cytokines going, if qualify for ARDS, consider pulse dose steroids 250 mg daily for 3 days, then taper Procalcitonin less than 0.25, continue azithromycin for anti-inflammatory and prevention of secondary bacterial pneumonia Diarrhea-likely secondary to COVID-19, gentle hydration's, keep euvolemia History of vitamin B12 deficiency, vitamin D deficiency, hyperlipidemia, hypothyroidism on chronic Synthroid, hypertension  
resume home medications if indicated ,and monitor clinically while inpatient, currently stable co morbidities add to patient's case complexity Md update 1310 Pt seen and examined. She is sitting comfortably in bedside chair. Per RN, pt was tearful just prior to entrance as she was having low O2 sats on 6L O2. Pt was turned up to 10L and during exam with O2 sats 90-92% at rest and with talking. Pt has had decreased oral intake, but tolerating. Denies CP, denies LE edema. She continues to have NP cough, exacerbated by deep inspirations Per OT At baseline she lives alone and is independent with all ADLs, IADLs, and working. Today, O2 sats ~ 90% on 6L at rest, but drops to 88% with activity. Instructed patient on deep breathing and energy conservation. She is functioning below her baseline and would benefit from continued OT.   
  
Viral Illness, Acute - Clinical Indications for Admission to Inpatient Care by Nhi Hirsch RN 
 
  
Review Entered Review Status 3/19/2021 10:26 Completed  
  
Criteria Review Clinical Indications for Admission to Inpatient Care Most Recent : Anastasiia Kerns Most Recent Date: 3/19/2021 10:26:37 EDT   
(X) Admission is indicated for  1 or more  of the following  [A] [B] (1) (2) (3) (4) (5) (6) (7)   
(8) (9):   
   (X) Pulmonary manifestation, as indicated by  1 or more  of the following :   
      (X) Hypoxemia   
      3/19/2021 10:26:37 EDT by Anastasiia Kerns   
        79% ra  
req 6l nc 98% + covid sat H&P Notes 
 
 H&P by eF Coyle DO at 03/18/21 0030 documented on ED to Hosp-Admission (Current) from 3/17/2021 in [address] Author: Fe Coyle DO Author Type: Physician Filed: 03/18/21 3525 Note Status: Addendum Cosign: Cosign Not Required Date of Service: 03/18/21 0030 : Fe Coyle DO (Physician) Prior Versions: 1.  Fe Coyle DO (Physician) at 21 0043 - Signed Expand AllCollapse All   
  
[facility] H&P/CONSULT 
NAME:            Peggy Yo Age:                62 y.o. 
:               1960 MRN:               [id number] PCP: Loki Nuñez MD 
Consulting MD: Treatment Team: Attending Provider: Estefana Alpers, DO; Primary Nurse: Melida Spring; Primary Nurse: Meir Abrams RN 
HPI:  
Patient 21-year-old female, diagnosis of COVID-19 on , in the emergency room, discharged home with a portable oximeter, patient's found proximally to be in the low 80s, with symptoms of shortness of breath, EMS was called, on arrival patient saturation 79, requires 4 L nasal cannula, patient also complaining of fevers, chills, fatigue, body ache, nausea, diarrhea, lower back pain, nonproductive cough   
  
  
Complete ROS done and is as stated in HPI or otherwise negative Past Medical History:  
Diagnosis Date  Acute medial meniscal tear 2018  
  Left  Hashimoto's disease    
 Hemorrhoids 2017  
  Internal + External per Colonoscopy  Hyperlipidemia    
 Hypertension    
 Hypothyroidism, acquired, autoimmune    
 Lumbar degenerative disc disease 2019  
  Mild Degeneration & Moderate Disc Bulges L3-S1 with Moderate Bilateral L>R Foramen Narrowings  Migraine headache    
 Osteoarthritis    
  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    
  Right Middle  Vitamin B12 deficiency 2013  Vitamin D deficiency 2013 Past Surgical History:  
Procedure Laterality Date  HX APPENDECTOMY      
 HX COLONOSCOPY   2017  
  Due   HX KNEE ARTHROSCOPY Right 2009  HX OVARIAN CYST REMOVAL Right    
 HX TONSILLECTOMY      
   
Prior to Admission Medications Prescriptions Last Dose Informant Patient Reported? Taking?   
OTHER     Yes No  
Sig: Indications: TUMERIC Omega-3 Fatty Acids (FISH OIL) 500 mg cap     Yes No  
Sig: Take  by mouth. acetaminophen (TYLENOL ARTHRITIS PAIN) 650 mg TbER     Yes No  
Sig: Take 650 mg by mouth every eight (8) hours as needed. albuterol (PROVENTIL HFA, VENTOLIN HFA, PROAIR HFA) 90 mcg/actuation inhaler     No No  
Sig: Take 2 Puffs by inhalation every four (4) hours as needed for Wheezing. aspirin delayed-release 81 mg tablet     Yes No  
Sig: Take 81 mg by mouth daily. baclofen (LIORESAL) 10 mg tablet     No No  
Sig: Take 1 Tab by mouth three (3) times daily as needed for Muscle Spasm(s). ergocalciferol (ERGOCALCIFEROL) 1,250 mcg (50,000 unit) capsule     No No  
Sig: Take 1 Cap by mouth every seven (7) days. glucosamine-chondroitin (ARTHX) 500-400 mg cap     Yes No  
Sig: Take 1 Cap by mouth daily. levothyroxine (synthroid) 50 mcg tablet     No No  
Sig: Take 1 Tab by mouth Daily (before breakfast). lisinopriL (PRINIVIL, ZESTRIL) 20 mg tablet     No No  
Sig: Take 1 Tab by mouth daily. meloxicam (MOBIC) 7.5 mg tablet     No No  
Sig: Take 1 Tab by mouth two (2) times daily (with meals). rosuvastatin (CRESTOR) 20 mg tablet     No No  
Sig: Take 1 Tab by mouth nightly. Facility-Administered Medications: None  
  
No Known Allergies Social History  
  
Tobacco Use  Smoking status: Never Smoker  Smokeless tobacco: Never Used Substance Use Topics  Alcohol use: Yes  
    Alcohol/week: 3.0 - 4.0 standard drinks  
    Types: 3 - 4 Glasses of wine per week Family History Problem Relation Age of Onset  Cancer Mother    
      Lung  Hypertension Mother    
 Diabetes Mother    
 Hypertension Father    
 Heart Disease Father    
 Hypertension Brother    
 Breast Cancer Neg Hx    
   
Objective:  
  
Visit Vitals /69 Pulse 73 Temp 99.2 °F (37.3 °C) Resp 18 Ht 5' 2\" (1.575 m) Wt 83.5 kg (184 lb) LMP 03/02/2010 SpO2 98% BMI 33.65 kg/m² Temp (24hrs), Av.2 °F (37.3 °C), Min:99.2 °F (37.3 °C), Max:99.2 °F (37.3 °C) 
  
Oxygen Therapy O2 Sat (%): 98 % (21) Pulse via Oximetry: 74 beats per minute (21) O2 Device: Nasal cannula (21) O2 Flow Rate (L/min): 6 l/min (21) Physical Exam: 
General:          Alert, cooperative, no distress, appears stated age. Head:               Normocephalic, without obvious abnormality, atraumatic. Nose:               Nares normal. No drainage or sinus tenderness. Lungs:             Coarse breath sounds bilaterally  
heart:               Regular rate and rhythm,  no murmur, rub or gallop. Abdomen:        Soft, non-tender. Not distended. Bowel sounds normal.  
Extremities:     No cyanosis. No edema. No clubbing Skin:                Texture, turgor normal. No rashes or lesions. Not Jaundiced Neurologic:      Alert and oriented x 3, no focal deficits Data Review:  
Recent Results Recent Results (from the past 24 hour(s)) TYPE & SCREEN  
  Collection Time: 21 10:42 PM  
Result Value Ref Range  
  Crossmatch Expiration 2021,    
  ABO/Rh(D) O POSITIVE    
  Antibody screen NEG    
CULTURE, BLOOD  
  Collection Time: 21 10:44 PM  
  Specimen: Blood Result Value Ref Range  
  Special Requests: RIGHT Antecubital 
     
  Culture result: PENDING    
CBC WITH AUTOMATED DIFF  
  Collection Time: 21 10:44 PM  
Result Value Ref Range  
  WBC 3.8 (L) 4.3 - 11.1 K/uL  
  RBC 4.78 4.05 - 5.2 M/uL  
  HGB 13.8 11.7 - 15.4 g/dL  
  HCT 42.5 35.8 - 46.3 %  
  MCV 88.9 79.6 - 97.8 FL  
  MCH 28.9 26.1 - 32.9 PG  
  MCHC 32.5 31.4 - 35.0 g/dL  
  RDW 12.9 11.9 - 14.6 %  
  PLATELET 758 019 - 955 K/uL  
  MPV 9.4 9.4 - 12.3 FL  
  ABSOLUTE NRBC 0.00 0.0 - 0.2 K/uL  
  DF AUTOMATED    
  NEUTROPHILS 77 43 - 78 %  
  LYMPHOCYTES 12 (L) 13 - 44 %  
  MONOCYTES 9 4.0 - 12.0 %  
  EOSINOPHILS 0 (L) 0.5 - 7.8 %  
  BASOPHILS 0 0.0 - 2.0 %  
  IMMATURE GRANULOCYTES 1 0.0 - 5.0 %  
  ABS. NEUTROPHILS 2.9 1.7 - 8.2 K/UL  
  ABS. LYMPHOCYTES 0.5 0.5 - 4.6 K/UL  
  ABS. MONOCYTES 0.4 0.1 - 1.3 K/UL  
  ABS. EOSINOPHILS 0.0 0.0 - 0.8 K/UL  
  ABS. BASOPHILS 0.0 0.0 - 0.2 K/UL  
  ABS. IMM. GRANS. 0.1 0.0 - 0.5 K/UL MAGNESIUM  
  Collection Time: 03/17/21 10:44 PM  
Result Value Ref Range  
  Magnesium 2.5 (H) 1.8 - 2.4 mg/dL METABOLIC PANEL, COMPREHENSIVE  
  Collection Time: 03/17/21 10:44 PM  
Result Value Ref Range  
  Sodium 134 (L) 136 - 145 mmol/L  
  Potassium 4.2 3.5 - 5.1 mmol/L  
  Chloride 97 (L) 98 - 107 mmol/L  
  CO2 28 21 - 32 mmol/L  
  Anion gap 9 7 - 16 mmol/L  
  Glucose 103 (H) 65 - 100 mg/dL  
  BUN 27 (H) 6 - 23 MG/DL  
  Creatinine 1.04 (H) 0.6 - 1.0 MG/DL  
  GFR est AA >60 >60 ml/min/1.73m2  
  GFR est non-AA 58 (L) >60 ml/min/1.73m2  
  Calcium 9.0 8.3 - 10.4 MG/DL  
  Bilirubin, total 0.4 0.2 - 1.1 MG/DL  
  ALT (SGPT) 71 (H) 12 - 65 U/L  
  AST (SGOT) 72 (H) 15 - 37 U/L  
  Alk. phosphatase 98 50 - 136 U/L  
  Protein, total 8.4 (H) 6.3 - 8.2 g/dL  
  Albumin 3.9 3.5 - 5.0 g/dL  
  Globulin 4.5 (H) 2.3 - 3.5 g/dL  
  A-G Ratio 0.9 (L) 1.2 - 3.5 PROCALCITONIN  
  Collection Time: 03/17/21 10:44 PM  
Result Value Ref Range  
  Procalcitonin 0.13 ng/mL LACTIC ACID  
  Collection Time: 03/17/21 10:44 PM  
Result Value Ref Range  
  Lactic acid 1.2 0.4 - 2.0 MMOL/L  
EKG, 12 LEAD, INITIAL  
  Collection Time: 03/17/21 10:44 PM  
Result Value Ref Range  
  Ventricular Rate 82 BPM  
  Atrial Rate 82 BPM  
  P-R Interval 150 ms  
  QRS Duration 70 ms  
  Q-T Interval 346 ms  
  QTC Calculation (Bezet) 404 ms  
  Calculated P Axis 36 degrees  
  Calculated R Axis 0 degrees  
  Calculated T Axis 59 degrees  
  Diagnosis      
    !! AGE AND GENDER SPECIFIC ECG ANALYSIS !! Normal sinus rhythm Normal ECG No previous ECGs available 
   
  
  
Imaging June Lanes [facility] XR CHEST PORT Final Result Question interval development of mild infiltrate in the right lung peripherally. The overlying scapula and breast tissue are limiting evaluation of this area.  
   
   
  
  
Assessment and Plan:  
  
    
Active Hospital Problems  
  Diagnosis Date Noted  COVID-19 03/18/2021  
  
  
Acute hypoxic respiratory failure requiring 6 L nasal cannula-secondary to COVID-19 pneumonia, high risk due to morbid obesity, high calcium score likely CAD Day 5 of COVID-19 diagnoses, with new asp failure,  will order remdesivir & convalescent plasma 
-Start Solu-Medrol 60 twice daily,Follow CT chest, ABG, patient's the window for cytokines going, if qualify for ARDS, consider pulse dose steroids 250 mg daily for 3 days, then taper  
-Procalcitonin less than 0.25, continue azithromycin for anti-inflammatory and prevention of secondary bacterial pneumonia 
  
Diarrhealikely secondary to COVID-19, gentle hydration's, keep euvolemia 
  
History of vitamin B12 deficiency, vitamin D deficiency, hyperlipidemia, hypothyroidism on chronic Synthroid, hypertension 
-resume home medications if indicated ,and monitor clinically while inpatient, currently stable co morbidities add to patient's case complexity 
  
  
  
DVT PPx: lovenox Code Status: full  
  
Anticipated discharge: 2-4 days, depending on patient's progression 
  
Signed By: Kelle Cordoba DO   
  March 18, 2021

## 2021-03-19 NOTE — ROUTINE PROCESS
After ambulating to the bathroom with 10L HF O2, SpO2 dropped to 72%. Oxygen increased to 15L HF, SpO2 88%. RT at bedside, will place patient on AirVo. MD notified via [employer].

## 2021-03-19 NOTE — PROGRESS NOTES
Problem: Breathing Pattern - Ineffective  Goal: *Absence of hypoxia  Outcome: Progressing Towards Goal  Goal: *Use of effective breathing techniques  Outcome: Progressing Towards Goal     Problem: Falls - Risk of  Goal: *Absence of Falls  Description: Document Nikia Fall Risk and appropriate interventions in the flowsheet.   Outcome: Progressing Towards Goal  Note: Fall Risk Interventions:            Medication Interventions: Teach patient to arise slowly

## 2021-03-19 NOTE — PROGRESS NOTES
Chart screened by CM for d/c planning. Pt currently requiring 30L O2 heated HFNC Airvo 35%. Per progress notes pt quickly destats with exertion. OT has evaluated pt and does not recommend any further skilled therapy upon d/c.  PT has not yet evaluated pt. No immediate needs identified. CM will continue to follow and remain available if any needs arise.

## 2021-03-20 ENCOUNTER — APPOINTMENT (OUTPATIENT)
Dept: GENERAL RADIOLOGY | Age: 61
DRG: 177 | End: 2021-03-20
Attending: INTERNAL MEDICINE
Payer: COMMERCIAL

## 2021-03-20 LAB
CRP SERPL HS-MCNC: 31.1 MG/L
TSH SERPL DL<=0.005 MIU/L-ACNC: 0.29 UIU/ML (ref 0.36–3.74)

## 2021-03-20 PROCEDURE — 74011000250 HC RX REV CODE- 250: Performed by: HOSPITALIST

## 2021-03-20 PROCEDURE — 36415 COLL VENOUS BLD VENIPUNCTURE: CPT

## 2021-03-20 PROCEDURE — 2709999900 HC NON-CHARGEABLE SUPPLY

## 2021-03-20 PROCEDURE — 74011250636 HC RX REV CODE- 250/636: Performed by: HOSPITALIST

## 2021-03-20 PROCEDURE — 86141 C-REACTIVE PROTEIN HS: CPT

## 2021-03-20 PROCEDURE — 94640 AIRWAY INHALATION TREATMENT: CPT

## 2021-03-20 PROCEDURE — 74011250637 HC RX REV CODE- 250/637: Performed by: INTERNAL MEDICINE

## 2021-03-20 PROCEDURE — P9047 ALBUMIN (HUMAN), 25%, 50ML: HCPCS | Performed by: INTERNAL MEDICINE

## 2021-03-20 PROCEDURE — 74011250637 HC RX REV CODE- 250/637: Performed by: HOSPITALIST

## 2021-03-20 PROCEDURE — 71045 X-RAY EXAM CHEST 1 VIEW: CPT

## 2021-03-20 PROCEDURE — 65270000029 HC RM PRIVATE

## 2021-03-20 PROCEDURE — 84443 ASSAY THYROID STIM HORMONE: CPT

## 2021-03-20 PROCEDURE — 5A09357 ASSISTANCE WITH RESPIRATORY VENTILATION, LESS THAN 24 CONSECUTIVE HOURS, CONTINUOUS POSITIVE AIRWAY PRESSURE: ICD-10-PCS | Performed by: INTERNAL MEDICINE

## 2021-03-20 PROCEDURE — 77010033711 HC HIGH FLOW OXYGEN

## 2021-03-20 PROCEDURE — 74011000258 HC RX REV CODE- 258: Performed by: INTERNAL MEDICINE

## 2021-03-20 PROCEDURE — 85379 FIBRIN DEGRADATION QUANT: CPT

## 2021-03-20 PROCEDURE — 94762 N-INVAS EAR/PLS OXIMTRY CONT: CPT

## 2021-03-20 PROCEDURE — 74011000258 HC RX REV CODE- 258: Performed by: HOSPITALIST

## 2021-03-20 PROCEDURE — 74011250636 HC RX REV CODE- 250/636: Performed by: INTERNAL MEDICINE

## 2021-03-20 RX ORDER — ASCORBIC ACID 500 MG
1000 TABLET ORAL 3 TIMES DAILY
Status: DISCONTINUED | OUTPATIENT
Start: 2021-03-20 | End: 2021-03-25

## 2021-03-20 RX ORDER — FUROSEMIDE 10 MG/ML
20 INJECTION INTRAMUSCULAR; INTRAVENOUS EVERY 8 HOURS
Status: DISCONTINUED | OUTPATIENT
Start: 2021-03-20 | End: 2021-03-20

## 2021-03-20 RX ORDER — ALBUMIN HUMAN 250 G/1000ML
12.5 SOLUTION INTRAVENOUS EVERY 12 HOURS
Status: DISCONTINUED | OUTPATIENT
Start: 2021-03-20 | End: 2021-03-20

## 2021-03-20 RX ORDER — FUROSEMIDE 20 MG/1
20 TABLET ORAL ONCE
Status: COMPLETED | OUTPATIENT
Start: 2021-03-20 | End: 2021-03-20

## 2021-03-20 RX ORDER — ZINC SULFATE 50(220)MG
220 CAPSULE ORAL DAILY
Status: DISCONTINUED | OUTPATIENT
Start: 2021-03-20 | End: 2021-03-25

## 2021-03-20 RX ORDER — ALBUMIN HUMAN 250 G/1000ML
25 SOLUTION INTRAVENOUS EVERY 12 HOURS
Status: COMPLETED | OUTPATIENT
Start: 2021-03-20 | End: 2021-03-21

## 2021-03-20 RX ORDER — LORAZEPAM 1 MG/1
1 TABLET ORAL
Status: DISCONTINUED | OUTPATIENT
Start: 2021-03-20 | End: 2021-03-28 | Stop reason: HOSPADM

## 2021-03-20 RX ORDER — SERTRALINE HYDROCHLORIDE 50 MG/1
50 TABLET, FILM COATED ORAL DAILY
Status: DISCONTINUED | OUTPATIENT
Start: 2021-03-20 | End: 2021-03-28 | Stop reason: HOSPADM

## 2021-03-20 RX ADMIN — ENOXAPARIN SODIUM 40 MG: 40 INJECTION SUBCUTANEOUS at 08:38

## 2021-03-20 RX ADMIN — ALBUMIN (HUMAN) 25 G: 0.25 INJECTION, SOLUTION INTRAVENOUS at 21:29

## 2021-03-20 RX ADMIN — ENOXAPARIN SODIUM 40 MG: 40 INJECTION SUBCUTANEOUS at 21:29

## 2021-03-20 RX ADMIN — Medication 10 ML: at 05:43

## 2021-03-20 RX ADMIN — BUDESONIDE AND FORMOTEROL FUMARATE DIHYDRATE 2 PUFF: 160; 4.5 AEROSOL RESPIRATORY (INHALATION) at 19:06

## 2021-03-20 RX ADMIN — FAMOTIDINE 20 MG: 20 TABLET, FILM COATED ORAL at 08:37

## 2021-03-20 RX ADMIN — FAMOTIDINE 20 MG: 20 TABLET, FILM COATED ORAL at 18:00

## 2021-03-20 RX ADMIN — Medication 10 ML: at 21:30

## 2021-03-20 RX ADMIN — AZITHROMYCIN MONOHYDRATE 250 MG: 250 TABLET ORAL at 08:37

## 2021-03-20 RX ADMIN — REMDESIVIR 100 MG: 100 INJECTION, POWDER, LYOPHILIZED, FOR SOLUTION INTRAVENOUS at 09:41

## 2021-03-20 RX ADMIN — ALBUTEROL SULFATE 2 PUFF: 90 AEROSOL, METERED RESPIRATORY (INHALATION) at 04:00

## 2021-03-20 RX ADMIN — DEXAMETHASONE 6 MG: 4 TABLET ORAL at 08:38

## 2021-03-20 RX ADMIN — CHOLECALCIFEROL TAB 125 MCG (5000 UNIT) 5000 UNITS: 125 TAB at 08:38

## 2021-03-20 RX ADMIN — GUAIFENESIN AND DEXTROMETHORPHAN 5 ML: 100; 10 SYRUP ORAL at 04:11

## 2021-03-20 RX ADMIN — CEFTRIAXONE SODIUM 1 G: 1 INJECTION, POWDER, FOR SOLUTION INTRAMUSCULAR; INTRAVENOUS at 11:39

## 2021-03-20 RX ADMIN — ROSUVASTATIN CALCIUM 20 MG: 20 TABLET, COATED ORAL at 21:29

## 2021-03-20 RX ADMIN — SERTRALINE 50 MG: 50 TABLET, FILM COATED ORAL at 09:46

## 2021-03-20 RX ADMIN — ASPIRIN 81 MG: 81 TABLET ORAL at 08:37

## 2021-03-20 RX ADMIN — TIOTROPIUM BROMIDE INHALATION SPRAY 2 PUFF: 3.12 SPRAY, METERED RESPIRATORY (INHALATION) at 08:20

## 2021-03-20 RX ADMIN — BUDESONIDE AND FORMOTEROL FUMARATE DIHYDRATE 2 PUFF: 160; 4.5 AEROSOL RESPIRATORY (INHALATION) at 08:20

## 2021-03-20 RX ADMIN — OXYCODONE HYDROCHLORIDE AND ACETAMINOPHEN 1000 MG: 500 TABLET ORAL at 21:29

## 2021-03-20 RX ADMIN — LEVOTHYROXINE SODIUM 50 MCG: 0.05 TABLET ORAL at 06:45

## 2021-03-20 RX ADMIN — BUSPIRONE HYDROCHLORIDE 15 MG: 10 TABLET ORAL at 16:48

## 2021-03-20 RX ADMIN — FUROSEMIDE 20 MG: 20 TABLET ORAL at 17:24

## 2021-03-20 RX ADMIN — OXYCODONE HYDROCHLORIDE AND ACETAMINOPHEN 1000 MG: 500 TABLET ORAL at 16:49

## 2021-03-20 RX ADMIN — BUSPIRONE HYDROCHLORIDE 15 MG: 10 TABLET ORAL at 21:29

## 2021-03-20 RX ADMIN — POLYETHYLENE GLYCOL 3350 17 G: 17 POWDER, FOR SOLUTION ORAL at 08:38

## 2021-03-20 RX ADMIN — ALBUMIN (HUMAN) 25 G: 0.25 INJECTION, SOLUTION INTRAVENOUS at 10:35

## 2021-03-20 RX ADMIN — Medication 220 MG: at 11:39

## 2021-03-20 RX ADMIN — Medication 10 ML: at 13:52

## 2021-03-20 RX ADMIN — ALBUTEROL SULFATE 2 PUFF: 90 AEROSOL, METERED RESPIRATORY (INHALATION) at 08:00

## 2021-03-20 NOTE — PROGRESS NOTES
[employer] Hospitalist Note     Admit Date:  3/17/2021 10:20 PM   Name:  Maricarmen Parents   Age:  62 y.o.  :  1960   MRN:  [id number]   PCP:  Xin Hernandez MD  Treatment Team: Attending Provider: Krysten Blackburn MD; [place] Manager[place] [facility] [place] Nurse: Vikas Awad RN    HPI/Subjective:     Chief Complaint : Positive For Covid-19    Subjective:  Patient 72-year-old female with a PMH of HTN, HL, Vit D deficiency who was diagnosed with COVID-19 on  in the emergency room, discharged home with a portable oximeter, who presented again on 3/17 with increasing SOB and hypoxia. Patient found oxygen saturation to be in the low 80s, with symptoms of shortness of breath, EMS was called, on arrival patient saturation 79, requires 4 L nasal cannula, patient also complaining of fevers, chills, fatigue, body ache, nausea, diarrhea, lower back pain, nonproductive cough. She was admitted for further care. 3/20: Patient's oxygenation at 45L PM (from 30s per minute). Not taking full breaths. Denies CP, denies LE edema. She continues to have NP cough, exacerbated by deep inspirations. Assessment and Plan:     Principal Problem:    Acute respiratory failure due to COVID-19 -Airvo  - Low threshold for placing on airvo. Switch to Decadron, continue remdesivir; she received convalescent plasma yesterday. Antibiotics.  -Incentive spirometry, inhalers added  - cont to monitor renal function, LFTs  -CRP/Dimer are not indicating imprending CRS  -3/20: obtain neg fluid balance    Active Problems:    Essential hypertension, benign - dc lisinopril      Hypothyroidism, acquired, autoimmune - cont Synthroid  -3/20: check TSH      Hyperlipidemia with target low density lipoprotein (LDL) cholesterol less than 130 mg/dL -Cont crestor. Vitamin D deficiency - cont supplementation    DC planning/Dispo:  Await less O2 need.  Diet:  DIET REGULAR  DVT ppx:  Lovenox  Care d/w patient, care team, Dr. Stephanie Davis    Signed:  Joel Metcalf MD        Objective:     Patient Vitals for the past 24 hrs:   Temp Pulse Resp BP SpO2   03/20/21 0824     93 %   03/20/21 0753 98.3 °F (36.8 °C) 71 18 119/66 97 %   03/20/21 0343 98.4 °F (36.9 °C) 71 18 101/68 97 %   03/19/21 2320 98.2 °F (36.8 °C) 80 18 97/81 90 %   03/19/21 1929 97.9 °F (36.6 °C) 81 18 118/70 95 %   03/19/21 1548     93 %   03/19/21 1510 98.2 °F (36.8 °C) 85 20 112/68 94 %   03/19/21 1143     94 %   03/19/21 1102 98.4 °F (36.9 °C) 77 20 100/62 96 %     Oxygen Therapy  O2 Sat (%): 93 % (03/20/21 0824)  Pulse via Oximetry: 81 beats per minute (03/20/21 0824)  O2 Device: Hi flow nasal cannula; Heated;Humidifier (03/20/21 3822)  Skin Assessment: Clean, dry, & intact (03/20/21 8702)  Skin Protection for O2 Device: No (03/19/21 0747)  O2 Flow Rate (L/min): 45 l/min (03/20/21 0824)  O2 Temperature: 87.8 °F (31 °C) (03/20/21 0824)  FIO2 (%): 80 % (03/20/21 0824)  Intake and Output:  Date 03/19/21 0700 - 03/20/21 0659 03/20/21 0700 - 03/21/21 0659   Shift 6130-5195 3881-2280 24 Hour Total 4330-2419 3781-0811 24 Hour Total   INTAKE   P.O. 237  237        P.O. 237  237      Shift Total(mL/kg) 237(2.8)  237(2.8)      OUTPUT   Urine(mL/kg/hr) 500(0.5)  500(0.2)        Urine Voided 500  500        Urine Occurrence(s) 1 x  1 x      Stool           Stool Occurrence(s) 1 x  1 x      Shift Total(mL/kg) 500(5.9)  500(5.9)      NET -263  -263      Weight (kg) 84.6 84.6 84.6 84.6 84.6 84.6         Physical Exam:    General:   Well nourished. No overt distress  HEENT:  PERRLA  CV:  +S1/S2, RRR. No M/R/G. No edema noted b/l LEs  Lungs:  + bibasilar crackles noted  Abdomen: +BS, soft, nontender, nondistended. Extremities: Warm and dry. No cyanosis   Skin:  No rashes or cyanosis noted  Neuro:  No gross focal deficits.     Psych:  Non-focal. Alert and oriented to person, time, place and situation    Data Reviewed:  I have reviewed all labs, meds, and studies from the last 24 hours:  No results found for this or any previous visit (from the past 24 hour(s)).     Current Meds:  Current Facility-Administered Medications   Medication Dose Route Frequency    sertraline (ZOLOFT) tablet 50 mg  50 mg Oral DAILY    albumin human 25% (BUMINATE) solution 25 g  25 g IntraVENous Q12H    sodium chloride (OCEAN) 0.65 % nasal squeeze bottle 2 Spray  2 Spray Both Nostrils Q2H PRN    busPIRone (BUSPAR) tablet 15 mg  15 mg Oral BID PRN    budesonide-formoteroL (SYMBICORT) 160-4.5 mcg/actuation HFA inhaler 2 Puff  2 Puff Inhalation BID RT    tiotropium bromide (SPIRIVA RESPIMAT) 2.5 mcg /actuation  2 Puff Inhalation DAILY    dexAMETHasone (DECADRON) tablet 6 mg  6 mg Oral DAILY    aspirin delayed-release tablet 81 mg  81 mg Oral DAILY    levothyroxine (SYNTHROID) tablet 50 mcg  50 mcg Oral ACB    baclofen (LIORESAL) tablet 5 mg  5 mg Oral Q12H PRN    [Held by provider] lisinopriL (PRINIVIL, ZESTRIL) tablet 20 mg  20 mg Oral DAILY    rosuvastatin (CRESTOR) tablet 20 mg  20 mg Oral QHS    sodium chloride (NS) flush 5-40 mL  5-40 mL IntraVENous Q8H    sodium chloride (NS) flush 5-40 mL  5-40 mL IntraVENous PRN    acetaminophen (TYLENOL) tablet 650 mg  650 mg Oral Q6H PRN    Or    acetaminophen (TYLENOL) suppository 650 mg  650 mg Rectal Q6H PRN    polyethylene glycol (MIRALAX) packet 17 g  17 g Oral DAILY    senna (SENOKOT) tablet 8.6 mg  1 Tab Oral DAILY PRN    bisacodyL (DULCOLAX) suppository 10 mg  10 mg Rectal DAILY PRN    ondansetron (ZOFRAN) injection 4 mg  4 mg IntraVENous Q6H PRN    famotidine (PEPCID) tablet 20 mg  20 mg Oral BID    enoxaparin (LOVENOX) injection 40 mg  40 mg SubCUTAneous Q12H    guaiFENesin-dextromethorphan (ROBITUSSIN DM) 100-10 mg/5 mL syrup 5 mL  5 mL Oral Q4H PRN    azithromycin (ZITHROMAX) tablet 250 mg  250 mg Oral DAILY    cholecalciferol (VITAMIN D3) (5000 Units/125 mcg) tablet 5,000 Units  5,000 Units Oral DAILY    0.9% sodium chloride infusion 250 mL  250 mL IntraVENous PRN    remdesivir 100 mg in 0.9% sodium chloride 250 mL IVPB  100 mg IntraVENous Q24H       Other Studies:  No results found for this visit on 03/17/21. No results found. All Micro Results     Procedure Component Value Units Date/Time    CULTURE, BLOOD [[id number]] Collected: 03/17/21 2244    Order Status: Completed Specimen: Blood Updated: 03/19/21 1212     Special Requests: --        RIGHT  Antecubital       Culture result: NO GROWTH 1 DAY       SARS-COV-2, PCR [[id number]]  (Abnormal) Collected: 03/18/21 0110    Order Status: Completed Specimen: Nasopharyngeal Updated: 03/18/21 1048     Specimen source Nasopharyngeal        SARS-CoV-2 Detected        Comment:      The specimen is POSITIVE for SARS-CoV-2, the novel coronavirus associated with COVID-19. This test has been authorized by the FDA under an Emergency Use Authorization (EUA) for use by authorized laboratories.         Fact sheet for Healthcare Providers: ConventionUpdate.co.nz  Fact sheet for Patients: https://fda.gov/media/926786/download       Methodology: RT-PCR  RESULTS VERIFIED, PHONED TO AND READ BACK BY  CAMMIE INIGUEZ ON 3/18/21 @1047, TA         CULTURE, BLOOD [[id number]] Collected: 03/17/21 2245    Order Status: Canceled Specimen: Blood

## 2021-03-20 NOTE — PROGRESS NOTES
1545: Telephone update given Kandace Haddad, cousin to patient she knew the code. Update given, no further concerns. Will monitor.

## 2021-03-20 NOTE — PROGRESS NOTES
Problem: Breathing Pattern - Ineffective  Goal: *Absence of hypoxia  Outcome: Progressing Towards Goal  Goal: *Use of effective breathing techniques  Outcome: Progressing Towards Goal  Goal: *PALLIATIVE CARE:  Alleviation of Dyspnea  Outcome: Progressing Towards Goal     Problem: Patient Education: Go to Patient Education Activity  Goal: Patient/Family Education  Outcome: Progressing Towards Goal     Problem: Falls - Risk of  Goal: *Absence of Falls  Description: Document Nikia Fall Risk and appropriate interventions in the flowsheet. Outcome: Progressing Towards Goal  Note: Fall Risk Interventions:            Medication Interventions: Teach patient to arise slowly                   Problem: Patient Education: Go to Patient Education Activity  Goal: Patient/Family Education  Outcome: Progressing Towards Goal     Problem: Risk for Spread of Infection  Goal: Prevent transmission of infectious organism to others  Description: Prevent the transmission of infectious organisms to other patients, staff members, and visitors.   Outcome: Progressing Towards Goal     Problem: Patient Education:  Go to Education Activity  Goal: Patient/Family Education  Outcome: Progressing Towards Goal     Problem: Patient Education: Go to Patient Education Activity  Goal: Patient/Family Education  Outcome: Progressing Towards Goal     Problem: Patient Education: Go to Patient Education Activity  Goal: Patient/Family Education  Outcome: Progressing Towards Goal

## 2021-03-20 NOTE — PROGRESS NOTES
Shift assessment completed via flow sheet.  Pt is alert and oriented x 4.  Pt is observed in bed in prone position.  No acute distress noted at this time.  Pt denies c/o pain and other needs.  Bed in low and locked position.  Call light within reach and pt is aware to call for assistance.

## 2021-03-21 LAB
ALBUMIN SERPL-MCNC: 3.8 G/DL (ref 3.5–5)
ALBUMIN/GLOB SERPL: 1.1 {RATIO} (ref 1.2–3.5)
ALP SERPL-CCNC: 77 U/L (ref 50–136)
ALT SERPL-CCNC: 34 U/L (ref 12–65)
ANION GAP SERPL CALC-SCNC: 5 MMOL/L (ref 7–16)
AST SERPL-CCNC: 27 U/L (ref 15–37)
BASOPHILS # BLD: 0 K/UL (ref 0–0.2)
BASOPHILS NFR BLD: 0 % (ref 0–2)
BILIRUB SERPL-MCNC: 0.4 MG/DL (ref 0.2–1.1)
BUN SERPL-MCNC: 22 MG/DL (ref 6–23)
CALCIUM SERPL-MCNC: 9.2 MG/DL (ref 8.3–10.4)
CHLORIDE SERPL-SCNC: 105 MMOL/L (ref 98–107)
CO2 SERPL-SCNC: 30 MMOL/L (ref 21–32)
CREAT SERPL-MCNC: 0.63 MG/DL (ref 0.6–1)
CRP SERPL HS-MCNC: 18 MG/L
D DIMER PPP FEU-MCNC: 0.41 UG/ML(FEU)
D DIMER PPP FEU-MCNC: 0.53 UG/ML(FEU)
DIFFERENTIAL METHOD BLD: ABNORMAL
EOSINOPHIL # BLD: 0 K/UL (ref 0–0.8)
EOSINOPHIL NFR BLD: 0 % (ref 0.5–7.8)
ERYTHROCYTE [DISTWIDTH] IN BLOOD BY AUTOMATED COUNT: 12.4 % (ref 11.9–14.6)
GLOBULIN SER CALC-MCNC: 3.6 G/DL (ref 2.3–3.5)
GLUCOSE SERPL-MCNC: 95 MG/DL (ref 65–100)
HCT VFR BLD AUTO: 37.5 % (ref 35.8–46.3)
HGB BLD-MCNC: 12.1 G/DL (ref 11.7–15.4)
IMM GRANULOCYTES # BLD AUTO: 0 K/UL (ref 0–0.5)
IMM GRANULOCYTES NFR BLD AUTO: 1 % (ref 0–5)
LYMPHOCYTES # BLD: 0.9 K/UL (ref 0.5–4.6)
LYMPHOCYTES NFR BLD: 11 % (ref 13–44)
MCH RBC QN AUTO: 29.1 PG (ref 26.1–32.9)
MCHC RBC AUTO-ENTMCNC: 32.3 G/DL (ref 31.4–35)
MCV RBC AUTO: 90.1 FL (ref 79.6–97.8)
MONOCYTES # BLD: 0.9 K/UL (ref 0.1–1.3)
MONOCYTES NFR BLD: 10 % (ref 4–12)
NEUTS SEG # BLD: 6.9 K/UL (ref 1.7–8.2)
NEUTS SEG NFR BLD: 79 % (ref 43–78)
NRBC # BLD: 0 K/UL (ref 0–0.2)
PLATELET # BLD AUTO: 293 K/UL (ref 150–450)
PMV BLD AUTO: 9.3 FL (ref 9.4–12.3)
POTASSIUM SERPL-SCNC: 3.6 MMOL/L (ref 3.5–5.1)
PROT SERPL-MCNC: 7.4 G/DL (ref 6.3–8.2)
RBC # BLD AUTO: 4.16 M/UL (ref 4.05–5.2)
SODIUM SERPL-SCNC: 140 MMOL/L (ref 136–145)
WBC # BLD AUTO: 8.8 K/UL (ref 4.3–11.1)

## 2021-03-21 PROCEDURE — 74011250637 HC RX REV CODE- 250/637: Performed by: INTERNAL MEDICINE

## 2021-03-21 PROCEDURE — 85379 FIBRIN DEGRADATION QUANT: CPT

## 2021-03-21 PROCEDURE — 74011250636 HC RX REV CODE- 250/636: Performed by: INTERNAL MEDICINE

## 2021-03-21 PROCEDURE — 74011250636 HC RX REV CODE- 250/636: Performed by: HOSPITALIST

## 2021-03-21 PROCEDURE — 74011000258 HC RX REV CODE- 258: Performed by: INTERNAL MEDICINE

## 2021-03-21 PROCEDURE — 65270000029 HC RM PRIVATE

## 2021-03-21 PROCEDURE — 86141 C-REACTIVE PROTEIN HS: CPT

## 2021-03-21 PROCEDURE — 74011250637 HC RX REV CODE- 250/637: Performed by: HOSPITALIST

## 2021-03-21 PROCEDURE — 77010033711 HC HIGH FLOW OXYGEN

## 2021-03-21 PROCEDURE — 74011000258 HC RX REV CODE- 258: Performed by: HOSPITALIST

## 2021-03-21 PROCEDURE — 85025 COMPLETE CBC W/AUTO DIFF WBC: CPT

## 2021-03-21 PROCEDURE — 80053 COMPREHEN METABOLIC PANEL: CPT

## 2021-03-21 PROCEDURE — 74011000250 HC RX REV CODE- 250: Performed by: HOSPITALIST

## 2021-03-21 PROCEDURE — 94762 N-INVAS EAR/PLS OXIMTRY CONT: CPT

## 2021-03-21 PROCEDURE — 94640 AIRWAY INHALATION TREATMENT: CPT

## 2021-03-21 RX ORDER — LOPERAMIDE HYDROCHLORIDE 2 MG/1
4 CAPSULE ORAL
Status: DISCONTINUED | OUTPATIENT
Start: 2021-03-21 | End: 2021-03-28 | Stop reason: HOSPADM

## 2021-03-21 RX ADMIN — OXYCODONE HYDROCHLORIDE AND ACETAMINOPHEN 1000 MG: 500 TABLET ORAL at 09:01

## 2021-03-21 RX ADMIN — BUSPIRONE HYDROCHLORIDE 15 MG: 10 TABLET ORAL at 09:01

## 2021-03-21 RX ADMIN — CHOLECALCIFEROL TAB 125 MCG (5000 UNIT) 5000 UNITS: 125 TAB at 09:01

## 2021-03-21 RX ADMIN — OXYCODONE HYDROCHLORIDE AND ACETAMINOPHEN 1000 MG: 500 TABLET ORAL at 17:33

## 2021-03-21 RX ADMIN — TIOTROPIUM BROMIDE INHALATION SPRAY 2 PUFF: 3.12 SPRAY, METERED RESPIRATORY (INHALATION) at 07:25

## 2021-03-21 RX ADMIN — SERTRALINE 50 MG: 50 TABLET, FILM COATED ORAL at 09:01

## 2021-03-21 RX ADMIN — OXYCODONE HYDROCHLORIDE AND ACETAMINOPHEN 1000 MG: 500 TABLET ORAL at 21:30

## 2021-03-21 RX ADMIN — BUSPIRONE HYDROCHLORIDE 15 MG: 10 TABLET ORAL at 21:30

## 2021-03-21 RX ADMIN — Medication 220 MG: at 09:01

## 2021-03-21 RX ADMIN — Medication 10 ML: at 05:23

## 2021-03-21 RX ADMIN — AZITHROMYCIN MONOHYDRATE 250 MG: 250 TABLET ORAL at 09:01

## 2021-03-21 RX ADMIN — BUDESONIDE AND FORMOTEROL FUMARATE DIHYDRATE 2 PUFF: 160; 4.5 AEROSOL RESPIRATORY (INHALATION) at 20:19

## 2021-03-21 RX ADMIN — LORAZEPAM 1 MG: 1 TABLET ORAL at 17:33

## 2021-03-21 RX ADMIN — POLYETHYLENE GLYCOL 3350 17 G: 17 POWDER, FOR SOLUTION ORAL at 09:01

## 2021-03-21 RX ADMIN — FAMOTIDINE 20 MG: 20 TABLET, FILM COATED ORAL at 17:33

## 2021-03-21 RX ADMIN — Medication 10 ML: at 21:29

## 2021-03-21 RX ADMIN — BUDESONIDE AND FORMOTEROL FUMARATE DIHYDRATE 2 PUFF: 160; 4.5 AEROSOL RESPIRATORY (INHALATION) at 07:25

## 2021-03-21 RX ADMIN — Medication 10 ML: at 15:13

## 2021-03-21 RX ADMIN — ENOXAPARIN SODIUM 40 MG: 40 INJECTION SUBCUTANEOUS at 09:01

## 2021-03-21 RX ADMIN — ENOXAPARIN SODIUM 40 MG: 40 INJECTION SUBCUTANEOUS at 21:29

## 2021-03-21 RX ADMIN — LOPERAMIDE HYDROCHLORIDE 4 MG: 2 CAPSULE ORAL at 18:27

## 2021-03-21 RX ADMIN — CEFTRIAXONE SODIUM 1 G: 1 INJECTION, POWDER, FOR SOLUTION INTRAMUSCULAR; INTRAVENOUS at 12:09

## 2021-03-21 RX ADMIN — ROSUVASTATIN CALCIUM 20 MG: 20 TABLET, COATED ORAL at 21:30

## 2021-03-21 RX ADMIN — LORAZEPAM 1 MG: 1 TABLET ORAL at 05:08

## 2021-03-21 RX ADMIN — REMDESIVIR 100 MG: 100 INJECTION, POWDER, LYOPHILIZED, FOR SOLUTION INTRAVENOUS at 09:12

## 2021-03-21 RX ADMIN — DEXAMETHASONE 6 MG: 4 TABLET ORAL at 09:01

## 2021-03-21 RX ADMIN — FAMOTIDINE 20 MG: 20 TABLET, FILM COATED ORAL at 09:01

## 2021-03-21 RX ADMIN — LEVOTHYROXINE SODIUM 50 MCG: 0.05 TABLET ORAL at 09:01

## 2021-03-21 NOTE — PROGRESS NOTES
Problem: Breathing Pattern - Ineffective  Goal: *Absence of hypoxia  3/21/2021 0735 by Pratik Joseph RN  Outcome: Progressing Towards Goal  3/21/2021 0734 by Pratik Joseph RN  Outcome: Progressing Towards Goal  Goal: *Use of effective breathing techniques  3/21/2021 0735 by Pratik Joseph RN  Outcome: Progressing Towards Goal  3/21/2021 0734 by Pratik Joseph RN  Outcome: Progressing Towards Goal  Goal: *PALLIATIVE CARE:  Alleviation of Dyspnea  3/21/2021 0735 by Pratik Joseph RN  Outcome: Progressing Towards Goal  3/21/2021 0734 by Pratik Joseph RN  Outcome: Progressing Towards Goal     Problem: Patient Education: Go to Patient Education Activity  Goal: Patient/Family Education  3/21/2021 0735 by Pratik Joseph RN  Outcome: Progressing Towards Goal  3/21/2021 0734 by Pratik Joseph RN  Outcome: Progressing Towards Goal     Problem: Risk for Spread of Infection  Goal: Prevent transmission of infectious organism to others  Description: Prevent the transmission of infectious organisms to other patients, staff members, and visitors.   3/21/2021 0735 by Pratik Joseph RN  Outcome: Progressing Towards Goal  3/21/2021 0734 by Pratik Joseph RN  Outcome: Progressing Towards Goal     Problem: Patient Education:  Go to Education Activity  Goal: Patient/Family Education  3/21/2021 0735 by Pratik Joseph RN  Outcome: Progressing Towards Goal  3/21/2021 0734 by Pratik Joseph RN  Outcome: Progressing Towards Goal     Problem: Patient Education: Go to Patient Education Activity  Goal: Patient/Family Education  3/21/2021 0735 by Pratik Joseph RN  Outcome: Progressing Towards Goal  3/21/2021 0734 by Pratik Joseph RN  Outcome: Progressing Towards Goal     Problem: Patient Education: Go to Patient Education Activity  Goal: Patient/Family Education  Outcome: Resolved/Met     Problem: Patient Education: Go to Patient Education Activity  Goal: Patient/Family Education  3/21/2021 0735 by Pratik Joseph RN  Outcome: Progressing Towards Goal  3/21/2021 0734 by Jorge Garnett RN  Outcome: Progressing Towards Goal     Problem: Gas Exchange - Impaired  Goal: Absence of hypoxia  Outcome: Progressing Towards Goal  Goal: Promote optimal lung function  Outcome: Progressing Towards Goal     Problem: Risk for Fluid Volume Deficit  Goal: Maintain normal heart rhythm  Outcome: Progressing Towards Goal  Goal: Maintain absence of muscle cramping  Outcome: Progressing Towards Goal  Goal: Maintain normal serum potassium, sodium, calcium, phosphorus, and pH  Outcome: Progressing Towards Goal     Problem: Loneliness or Risk for Loneliness  Goal: Demonstrate positive use of time alone when socialization is not possible  Outcome: Progressing Towards Goal     Problem: Fatigue  Goal: Verbalize increase energy and improved vitality  Outcome: Progressing Towards Goal     Pt in no distress, no C/O pain/N/V/diarrhea, airvo intact & oxygen saturation appropriate (94%), IV intact, RT has been in to assess pt.

## 2021-03-21 NOTE — PROGRESS NOTES
[facility] Hospitalist Note     Admit Date:  3/17/2021 10:20 PM   Name:  Ana Staton   Age:  62 y.o.  :  1960   MRN:  [id number]   PCP:  Rosalia Mcdonough MD  Treatment Team: Attending Provider: Keysha Posada MD; Care Manager: Vanessa Lindsey PAUL; Primary Nurse: Shobha Guevara, RN; Staff Nurse: Silver Trejo RN    HPI/Subjective:     Chief Complaint : Positive For Covid-19    Subjective:  Patient 52-year-old female with a PMH of HTN, HL, Vit D deficiency who was diagnosed with COVID-19 on  in the emergency room, discharged home with a portable oximeter, who presented again on 3/17 with increasing SOB and hypoxia. Patient found oxygen saturation to be in the low 80s, with symptoms of shortness of breath, EMS was called, on arrival patient saturation 79, requires 4 L nasal cannula, patient also complaining of fevers, chills, fatigue, body ache, nausea, diarrhea, lower back pain, nonproductive cough. She was admitted for further care. 3/21: Patient's on Airvo 50LPM  Anxiety improved on Zoloft  Inflammatory marker labs are looking better  -Encouraged incentive spirometer, reassured   Denies CP, denies LE edema. Assessment and Plan:     Principal Problem:    Acute respiratory failure due to COVID-19 -Airvo  -   C/w Decadron, continue remdesivir; she received convalescent plasma . Antibiotics.  -Incentive spirometry, inhalers added  - cont to monitor renal function, LFTs  -CRP/Dimer are not indicating impending CRS  -3/20: obtain neg fluid balance  -3/21: lungs sound better, c/w current mgmt    Active Problems:    Essential hypertension, benign - dc lisinopril      Hypothyroidism, acquired, autoimmune - cont Synthroid  -3/20: check TSH      Hyperlipidemia with target low density lipoprotein (LDL) cholesterol less than 130 mg/dL -Cont crestor. Vitamin D deficiency - cont supplementation    DC planning/Dispo:  Await less O2 need.  Diet:  DIET REGULAR  DVT ppx: Lovenox  Care d/w patient, care team, Dr. Nabeel Coffey    Signed:  Eugenio Landeros MD        Objective:     Patient Vitals for the past 24 hrs:   Temp Pulse Resp BP SpO2   03/21/21 1130     94 %   03/21/21 1129 97.7 °F (36.5 °C) 68 18 112/68 96 %   03/21/21 0718 98.1 °F (36.7 °C) 78 18 114/69 98 %   03/21/21 0715     95 %   03/21/21 0311 98.5 °F (36.9 °C) 76 18 134/79 97 %   03/21/21 0244     96 %   03/21/21 0009 98.9 °F (37.2 °C) 78 16 124/69 98 %   03/20/21 2251     95 %   03/20/21 1938 97.9 °F (36.6 °C) 76 18 127/82 97 %   03/20/21 1906     95 %   03/20/21 1614     90 %   03/20/21 1538 97.8 °F (36.6 °C) 80 18 127/73 96 %   03/20/21 1205 97.5 °F (36.4 °C) 72 17 117/72 99 %     Oxygen Therapy  O2 Sat (%): 94 % (03/21/21 1130)  Pulse via Oximetry: 70 beats per minute (03/21/21 1130)  O2 Device: Heated; Hi flow nasal cannula (03/21/21 1130)  Skin Assessment: Clean, dry, & intact (03/20/21 0221)  Skin Protection for O2 Device: No (03/19/21 5547)  O2 Flow Rate (L/min): 50 l/min (03/21/21 1130)  O2 Temperature: 87.8 °F (31 °C) (03/21/21 1130)  FIO2 (%): 90 % (03/21/21 1130)  Intake and Output:  Date 03/20/21 0700 - 03/21/21 0659 03/21/21 0700 - 03/22/21 0659   Shift 1866-2858 8109-4596 24 Hour Total 3074-2533 8422-8148 24 Hour Total   INTAKE   Shift Total(mL/kg)         OUTPUT   Urine(mL/kg/hr) 175(0.2)  175(0.1)        Urine Voided 175  175        Urine Occurrence(s)  2 x 2 x      Stool           Stool Occurrence(s) 1 x 1 x 2 x      Shift Total(mL/kg) 175(2.1)  175(2.1)      NET -175  -175      Weight (kg) 84.6 84.6 84.6 84.6 84.6 84.6         Physical Exam:    General:   Well nourished. No overt distress  HEENT:  PERRLA  CV:  +S1/S2, RRR. No M/R/G. No edema noted b/l LEs  Lungs:  + bibasilar crackles noted  Abdomen: +BS, soft, nontender, nondistended. Extremities: Warm and dry. No cyanosis   Skin:  No rashes or cyanosis noted  Neuro:  No gross focal deficits.     Psych:  Non-focal. Alert and oriented to person, time, place and situation    Data Reviewed:  I have reviewed all labs, meds, and studies from the last 24 hours:  Recent Results (from the past 24 hour(s))   CRP, HIGH SENSITIVITY    Collection Time: 03/20/21  5:54 PM   Result Value Ref Range    CRP, High sensitivity 31.1 mg/L   TSH 3RD GENERATION    Collection Time: 03/20/21  5:54 PM   Result Value Ref Range    TSH 0.289 (L) 0.358 - 3.740 uIU/mL   D DIMER    Collection Time: 03/20/21  9:12 PM   Result Value Ref Range    D DIMER 0.41 <0.56 ug/ml(FEU)   CBC WITH AUTOMATED DIFF    Collection Time: 03/21/21  9:41 AM   Result Value Ref Range    WBC 8.8 4.3 - 11.1 K/uL    RBC 4.16 4.05 - 5.2 M/uL    HGB 12.1 11.7 - 15.4 g/dL    HCT 37.5 35.8 - 46.3 %    MCV 90.1 79.6 - 97.8 FL    MCH 29.1 26.1 - 32.9 PG    MCHC 32.3 31.4 - 35.0 g/dL    RDW 12.4 11.9 - 14.6 %    PLATELET 089 817 - 419 K/uL    MPV 9.3 (L) 9.4 - 12.3 FL    ABSOLUTE NRBC 0.00 0.0 - 0.2 K/uL    DF AUTOMATED      NEUTROPHILS 79 (H) 43 - 78 %    LYMPHOCYTES 11 (L) 13 - 44 %    MONOCYTES 10 4.0 - 12.0 %    EOSINOPHILS 0 (L) 0.5 - 7.8 %    BASOPHILS 0 0.0 - 2.0 %    IMMATURE GRANULOCYTES 1 0.0 - 5.0 %    ABS. NEUTROPHILS 6.9 1.7 - 8.2 K/UL    ABS. LYMPHOCYTES 0.9 0.5 - 4.6 K/UL    ABS. MONOCYTES 0.9 0.1 - 1.3 K/UL    ABS. EOSINOPHILS 0.0 0.0 - 0.8 K/UL    ABS. BASOPHILS 0.0 0.0 - 0.2 K/UL    ABS. IMM.  GRANS. 0.0 0.0 - 0.5 K/UL   CRP, HIGH SENSITIVITY    Collection Time: 03/21/21  9:41 AM   Result Value Ref Range    CRP, High sensitivity 18.0 mg/L   D DIMER    Collection Time: 03/21/21  9:41 AM   Result Value Ref Range    D DIMER 0.53 <0.56 ug/ml(FEU)   METABOLIC PANEL, COMPREHENSIVE    Collection Time: 03/21/21  9:41 AM   Result Value Ref Range    Sodium 140 136 - 145 mmol/L    Potassium 3.6 3.5 - 5.1 mmol/L    Chloride 105 98 - 107 mmol/L    CO2 30 21 - 32 mmol/L    Anion gap 5 (L) 7 - 16 mmol/L    Glucose 95 65 - 100 mg/dL    BUN 22 6 - 23 MG/DL    Creatinine 0.63 0.6 - 1.0 MG/DL    GFR est AA >60 >60 ml/min/1.73m2    GFR est non-AA >60 >60 ml/min/1.73m2    Calcium 9.2 8.3 - 10.4 MG/DL    Bilirubin, total 0.4 0.2 - 1.1 MG/DL    ALT (SGPT) 34 12 - 65 U/L    AST (SGOT) 27 15 - 37 U/L    Alk.  phosphatase 77 50 - 136 U/L    Protein, total 7.4 6.3 - 8.2 g/dL    Albumin 3.8 3.5 - 5.0 g/dL    Globulin 3.6 (H) 2.3 - 3.5 g/dL    A-G Ratio 1.1 (L) 1.2 - 3.5         Current Meds:  Current Facility-Administered Medications   Medication Dose Route Frequency    sertraline (ZOLOFT) tablet 50 mg  50 mg Oral DAILY    cefTRIAXone (ROCEPHIN) 1 g in 0.9% sodium chloride (MBP/ADV) 50 mL MBP  1 g IntraVENous Q24H    zinc sulfate (ZINCATE) 50 mg zinc (220 mg) capsule 220 mg  220 mg Oral DAILY    ascorbic acid (vitamin C) (VITAMIN C) tablet 1,000 mg  1,000 mg Oral TID    LORazepam (ATIVAN) tablet 1 mg  1 mg Oral BID PRN    sodium chloride (OCEAN) 0.65 % nasal squeeze bottle 2 Spray  2 Spray Both Nostrils Q2H PRN    busPIRone (BUSPAR) tablet 15 mg  15 mg Oral BID PRN    budesonide-formoteroL (SYMBICORT) 160-4.5 mcg/actuation HFA inhaler 2 Puff  2 Puff Inhalation BID RT    tiotropium bromide (SPIRIVA RESPIMAT) 2.5 mcg /actuation  2 Puff Inhalation DAILY    dexAMETHasone (DECADRON) tablet 6 mg  6 mg Oral DAILY    levothyroxine (SYNTHROID) tablet 50 mcg  50 mcg Oral ACB    baclofen (LIORESAL) tablet 5 mg  5 mg Oral Q12H PRN    [Held by provider] lisinopriL (PRINIVIL, ZESTRIL) tablet 20 mg  20 mg Oral DAILY    rosuvastatin (CRESTOR) tablet 20 mg  20 mg Oral QHS    sodium chloride (NS) flush 5-40 mL  5-40 mL IntraVENous Q8H    sodium chloride (NS) flush 5-40 mL  5-40 mL IntraVENous PRN    acetaminophen (TYLENOL) tablet 650 mg  650 mg Oral Q6H PRN    Or    acetaminophen (TYLENOL) suppository 650 mg  650 mg Rectal Q6H PRN    polyethylene glycol (MIRALAX) packet 17 g  17 g Oral DAILY    senna (SENOKOT) tablet 8.6 mg  1 Tab Oral DAILY PRN    bisacodyL (DULCOLAX) suppository 10 mg  10 mg Rectal DAILY PRN    ondansetron (ZOFRAN) injection 4 mg  4 mg IntraVENous Q6H PRN    famotidine (PEPCID) tablet 20 mg  20 mg Oral BID    enoxaparin (LOVENOX) injection 40 mg  40 mg SubCUTAneous Q12H    guaiFENesin-dextromethorphan (ROBITUSSIN DM) 100-10 mg/5 mL syrup 5 mL  5 mL Oral Q4H PRN    azithromycin (ZITHROMAX) tablet 250 mg  250 mg Oral DAILY    cholecalciferol (VITAMIN D3) (5000 Units/125 mcg) tablet 5,000 Units  5,000 Units Oral DAILY    0.9% sodium chloride infusion 250 mL  250 mL IntraVENous PRN    remdesivir 100 mg in 0.9% sodium chloride 250 mL IVPB  100 mg IntraVENous Q24H       Other Studies:  No results found for this visit on 03/17/21. No results found. All Micro Results     Procedure Component Value Units Date/Time    CULTURE, BLOOD [[id number]] Collected: 03/17/21 2244    Order Status: Completed Specimen: Blood Updated: 03/20/21 1119     Special Requests: --        RIGHT  Antecubital       Culture result: NO GROWTH 2 DAYS       SARS-COV-2, PCR [[id number]]  (Abnormal) Collected: 03/18/21 0110    Order Status: Completed Specimen: Nasopharyngeal Updated: 03/18/21 1048     Specimen source Nasopharyngeal        SARS-CoV-2 Detected        Comment:      The specimen is POSITIVE for SARS-CoV-2, the novel coronavirus associated with COVID-19. This test has been authorized by the FDA under an Emergency Use Authorization (EUA) for use by authorized laboratories.         Fact sheet for Healthcare Providers: ConventionUpdate.co.nz  Fact sheet for Patients: https://fda.gov/media/829190/download       Methodology: RT-PCR  RESULTS VERIFIED, PHONED TO AND READ BACK BY  CAMMIE INIGUEZ ON 3/18/21 @1047, TA         CULTURE, BLOOD [[id number]] Collected: 03/17/21 2245    Order Status: Canceled Specimen: Blood

## 2021-03-21 NOTE — PROGRESS NOTES
TRANSFER - IN REPORT:    Verbal report received from [facility] RN on Ana Olivia  being received from 5th floor 504 for routine progression of care      Report consisted of patients Situation, Background, Assessment and   Recommendations(SBAR). Information from the following report(s) SBAR was reviewed with the receiving nurse. Opportunity for questions and clarification was provided. Assessment completed upon patients arrival to unit and care assumed.

## 2021-03-21 NOTE — ROUTINE PROCESS
Respiratory Care Services Policy Number: [id number] Title: Oxygen Protocol Effective Date: 01/1996 Revised Date: 06/2013, 02/29/2016, 4/2018, 7/2019 Reviewed Date: 05/2014, 03/2015, 06/2017, 11/2020 I. Policy: The Oxygen Protocol will be initiated for all patients upon written order from physician for administration of oxygen therapy or if a patient is found to have an oxygen saturation of 88% or less. Special consideration: the goal of oxygen therapy for COPD patients is to maintain oxygen saturation between 88% - 92% to comply with GOLD Guidelines. II. Purpose: To provide protocol driven respiratory therapy for the administration of oxygen at concentrations greater than that in ambient air with the intent of treating or preventing the symptoms and manifestations of hypoxia. III. Responsibility: Director Respiratory Care Services, all Respiratory Care Practitioners IV. Indications: A. Implement this protocol for patients when physician orders oxygen to be administered or when patient is found to have an oxygen saturation of 88% or less. B. To assure routine monitoring of patient's oxygen saturation b.i.d. and to make appropriate adjustments in accordance with ordered oxygen saturation parameters. C. To assure continuity of respiratory care that meets [facility] Clinical Practice Guidelines and GOLD Guidelines. D. Hb < 8 
E. Sickle Cell anemia crisis V. Assessment:  Assess the following parameters to determine the need to adjust oxygen: A. Measurement of patient's oxygen saturation via pulse oximetry. B. Observation of patient's color, respiratory effort, and responsiveness. C. Measurement of heart rate and respiratory rate. D. Complete a three-step ambulatory oxygen saturation when ordered. VI. Initiation:  Upon receipt of an order for oxygen, the RCP will: A. Verify order in the patient's EMR, which should include the desired oxygen saturation to be maintained. B. The patient shall be placed on oxygen with humidity (except for those oxygen delivery devices that do not require humidity, i.e. venturi masks and non-rebreather masks) as ordered by the physician to achieve the prescribed oxygen saturation. C. In the event that no saturation is specified, a saturation of 90% will be maintained. D. Patients, who are found to have a SaO2 of 88% or less, may be started on supplemental oxygen as described above. E. Patients admitted with cardiac problems/disease shall be maintained at 92% per Chest Committee recommendation. F. The patient will be informed of the \"no smoking policy\" and instructed in the proper use of oxygen therapy. G. Once desired oxygen saturation has been achieved, the RCP will document FIO2 and oxygen saturation in the respiratory section of the patient's EMR. VII. Maintenance: A. 30-second oxygen saturation check will be taken to maintain the saturation ordered by the physician each day. B. Patients will be assessed each shift and as needed by pulse oximetry to determine if oxygen needs to be decreased, increased or discontinued. C. If changes in FIO2 are indicated, all changes will be documented in the respiratory section of the patient's EMR. D. If no changes in FIO2 are required, the patient's oxygen flow rate and saturation will be recorded in the respiratory section of the patient's EMR. E. Per [facility], patients who are receiving oxygen therapy but are not on oxygen at home, should be weaned off oxygen as soon as possible or when anticipated discharge becomes evident. Jeane Goldy will be discontinued after oxygen saturation has been maintained for 24 hours on room air and documented in the patient's EMR. G. Patients on the Inpatient Rehabilitation area on 9th floor will be exempt from having their oxygen discontinued per protocol.  Oxygen may be weaned but will be changed to prn to meet the needs of the patient when exercising and participating in therapy. H. The goal of oxygen therapy is to maintain patients with a diagnosis of COPD at oxygen saturation between 88% - 92% to comply with GOLD Guidelines. VIII. Safety: RCP will address the following safety issues: A. Identify patient using the two patient identifiers name and birth date via ID bracelet. B. Perform hand hygiene per hospital policy utilizing Standard Precautions for all patients and following transmission-based isolation as indicated per hospital policy. C. Cardiac patients will be maintained at an oxygen saturation of 92%. D. If a patient's FIO2 requirements necessitate changing oxygen delivery devices to a high concentration of oxygen, documentation indicating the change must be included in the progress notes, as well as in the respiratory flowsheet. E. If a patient has a hemoglobin level <8 mg. RCP will consult physician before discontinuing oxygen. IX. Interventions: A. RCP will assess patient for signs of respiratory distress or suspicion of CO2 retention. B. An ABG may be obtained for patients exhibiting respiratory distress or sickle cell crisis. C. An order should be entered into patient's EMR for ABG under per protocol. X. Documentation A. Document assessment findings in the respiratory section of the patient's EMR. B. Document changes in therapy per protocol in the respiratory orders section and in the care plan section of the patient's EMR. C. Document patient education in the patient education section of the patient's EMR. XI. Reportable Conditions:  Report to the physician immediately: A. Acute changes in patient's respiratory status. B. An oxygen saturation <85%. C. A change in oxygen delivery device to provide a high concentration of oxygen. XII. Patient Instructions: Review with Patient A. Purpose of oxygen therapy B. Proper technique for using oxygen C. No smoking policy Approval: Pulmonary Committee (1-25-96) Revision: Chest Committee (4-28-05) L - Respiratory Care Department Policy, Procedure and Protocol Guideline Manual, 1995, JENELLE Orosco. L - Therapist Driven Respiratory Care Protocols  A Practitioner's Guide for Criteria-Based Respiratory Care by Venkat Reza M.D., and JENELLE Paul RRT. L - The rationale for therapist-driven protocols: an update. Respiratory Care 1998. N  [facility] Clinical Practice Guidelines. Respiratory Care Services Policy Number: [id number] Title: Patient Care Assessment Protocol Effective Date: 01/1999 Revised Date: 05/2014, 04/2018, 12/2018, 07/2019 Reviewed Date: 06/2013/ 03/2015, 03/2016, 06/2017, 11/2020 Overview In an effort to improve quality and reduce costs of respiratory care at [facility], the Respiratory Department has developed a number of Patient Care Protocols. These protocols have been developed according to Mike 3 and are utilized for those patients who are ordered respiratory therapy using therapeutic indications and standardized approaches for accomplishing objectives. Patient Care Protocols are intended to improve care by:  Defining the indications and standards of care agreed upon by the Pulmonary Medicine and [address] [place] [address] of [facility].  Training respiratory care practitioners to apply those criteria to individual patients and modify therapy as indicated by the protocols.  Documenting the indication and care plan as part of the initial ordering process.  Tapering or discontinuing treatments once the indication for therapy changes. The Patient Care Protocols shall be universally applied throughout the hospital as determined by  
the Pulmonary Medicine and [address] [place], [place] [address]e. Rationale for Patient Care Assessment Protocols:  Continuous Quality Improvement  Cost containment  Standardization of care 
 Enhanced continuity of care  Utilization review  Timely intervention The following patient care assessment protocols have been developed:  Aerosolized Medication Protocol  Bronchial Hygiene Protocol  Oxygen Protocol  CVRU Fast Track Weaning Protocol  Asthma Treatment Protocol ER 
 Pediatric Asthma Treatment Protocol ER  Alpha-1 Antitrypsin Deficiency Protocol  Prone Positioning Protocol  COPD Protocol  Home Oxygen Assessment Protocol  Ventilator Weaning Protocol  Lung Volume Expansion Protocol The Director of Claudio Tsang oversees the Patient Care Assessment Program. The Respiratory Educator is responsible for protocol development and training. The Supervisor is responsible for implementation and  activities. Each patient with an order for respiratory treatments will receive an evaluation. Respiratory Care Practitioners (RCP's) will perform the evaluations. The same evaluation tool will be utilized for initial and follow-up assessments. If the patient does not meet criteria for ordered therapy, the therapy will be discontinued. If the patient demonstrates an adverse response to initially ordered therapy, the therapy will be discontinued and the physician will be contacted. Specific physician's orders that deviate from protocols and are deemed \"inappropriate\" or \"unsafe\" will be addressed with ordering physician and/or medical director as required. Respiratory Patient Care Assessment Protocols I. Policy: In an effort to provide quality patient care and effective utilization of services, physicians who order respiratory therapy will have their patients treated via the protocols established (see attached) Respiratory Care Practitioners (RCP's) will complete the initial assessment which will indicate patient needs,  the care plan developed and will performed within 24 hours of admission.  Frequency of the therapy will be set according to the results of the respiratory therapy evaluation and frequency guidelines policy. Reassessment will be continued every 48 hours and more frequently as needed for the individual patient. II. Purpose: F. To provide a process that will allow for ongoing assessment and care plan modification for patients receiving respiratory services based on both objective and or subjective patient responses to interventions. This process of protocol utilization will assist in patient care progression while eliminating the need for the physician to continually update respiratory therapy orders. G. To assure continuity of respiratory care that meets [facility] Clinical Practice Guidelines. III. Initiation:  Implement Respiratory Care Protocols for patients who are ordered by physician  
       to receive respiratory therapy procedures or for ventilator management. IV. Protocol: 
E. Upon receiving an order for therapy the RCP will review the patient's EMR (electronic medical record) for all pertinent information includin. Physician's order for therapy 2. Patient history and physical examination 3. Physician progress notes 4. Diagnostic. X-rays, PFT's, arterial blood gases etc. 
F. The RCP will perform a respiratory assessment in the following manner: 1. General observations: color, pattern and effort of breathing, chest expansion, (symmetrical and bilateral), level of consciousness and the ability to ambulate. 2. The RCP will assess patient's cough ability and determine if bronchial hygiene is needed. If patient is unable to produce sputum, at that time, the RCP should question the patient with regard to their sputum: production, color consistency, frequency and amount. 3. Auscultation: Using a stethoscope, the RCP will listen and note quality of breath sounds and presence or absence of adventitious breath sounds in all lung fields, both anteriorly and posteriorly.  
4. Upon completing the EMR review and physical assessment, the RCP will document findings in the RT Assessment section of the EMR. The score level will be provided and will be used to determine the frequency of therapy. V. Indications: A. Bronchial Hygiene Protocol indications: 1. Potential for or presence of atelectasis. 1. Need for hydration and removal of retained secretions. 1. Need for improvement of cough effectiveness. 1. Presence of conditions associated with disorder of pulmonary clearance: 
a. Cystic fibrosis b. Bronchiectasis 
c. Neuromuscular disease 
d. Obstructive lung diseases 
e. Restrictive lung diseases Aerosolized Medication(s) Protocol indications:Treatment of bronchospasm/wheezing 2. Improvement of mucociliary clearance 3. Treatment of stridor 4. History of Bronchiectasis, Asthma or COPD 
C. Oxygen Therapy Protocol indications: 1. Documented hypoxemia 2. Severe trauma 3. Acute myocardial infarction 4. Short-term therapy (e.g. post anesthesia recovery) D. CVRU Ventilator Weaning Protocol indications: 1. All mechanically ventilated surgical patients unless they have a no wean order. E. Asthma Treatment Protocol ER indications: 1. Patients 15years of age and older that have been triaged or diagnosed with   asthma exacerbation shall be indicated for the ER Asthma Treatment Protocol. A physician order will be required to initiate the protocol. F. Pediatric Asthma protocol in the ER indications: 1. Patients less than 15years old that have been triaged or diagnosed with asthma exacerbation shall be indicated for the Pediatric Asthma protocol. A physician order will be required to initiate the protocol. G. Alpha-1 Antitrypsin Deficiency Testing protocol indications: 1. Patients admitted and diagnosed with COPD. H. Prone Positioning Protocol indications: 
       1. Acute lung injury 2. Acute respiratory distress syndrome (ARDS) I.   Respiratory Care COPD Protocol indications: 1. History of COPD in patient's records 2. Smoking history J. Home Oxygen Assessment Protocol indications: 1. Chronic lung disease 2. Cor pulmonale 3. Unable to wean to room air 48 hours prior to anticipated discharge. K.  Ventilator Weaning Protocol indications: 1. Patient's mechanically ventilated 2. Managed by intensivist 
L. Lung Volume Expansion Protocol indications: 
      1. Any patient at risk for pulmonary complications. VI. Maintenance: H. Timely patient assessment is an integral part of this protocol therefore the following will be applied: 1. All non- critical care patients will be evaluated upon receiving initial respiratory care orders within 24 hours and re-evaluated within 48 hours (or more as needed). 2. Orders requesting a Respiratory Consult will be responded to in the following manner: 
a. In patient emergency situations, the RCP assigned to the floor will respond immediately to the patient, provide an initial respiratory assessment, and contact the patient's physician as necessary for appropriate orders. b. In non-emergent situations, the RCP assigned to the floor will respond to the patient within 90 minutes and provide an initial respiratory assessment and contact patient's physician as necessary for appropriate orders. c. An RCP will provide a comprehensive assessment as soon as possible. 3. Upon completion of an evaluation, the RCP will complete documentation in the patient's EMR in the RT Assessment section. 4. The RCP who completes the assessment will document orders for therapy in the orders section of the patient's EMR selecting new order. Next, per protocol should be selected indicating it is a protocol order and sign orders should be selected to complete the process. The applicable protocol must be added to the progress note per Joint Commission guidelines.  
5. The Pharmacy and Therapeutics (P&T) Committee has mandated that the medication Xopenex may be changed to unit dose albuterol without an order, except for those patients receiving Xopenex due to cardiac arrhythmias. 1. The dosage for these patients should be 0.63 mg. and may be changed from 1.25 mg. to 0.63 mg per P & T Committee by the RCP completing the assessment. 6. Patients who are not experiencing cardiac arrhythmias, and are ordered Xopenex and Atrovent may be changed to Duoneb. VII. Safety:       
I. The following safety issues shall be monitored: 1. The RCP will perform hand hygiene per hospital policy utilizing Standard Precautions for all patients and following transmission-based isolation as indicated per hospital policy. 2. The RCP must exercise professional judgment in classifying the patient for frequency of therapy. 3. Appropriate classification of the patient will require an evaluation utilizing the Therapy Assessment Protocol Guidelines. 4. The RCP will confer with the physician concerning the care of the patient at any time questions or problems arise. 5. If during therapy, the patient exhibits no improvement, or deterioration in clinical status the RCP will notify the physician and the patient's nurse. VIII. Interventions:  
F. The patient's nurse is responsible concerning all items related to his/her care. Ongoing communication with nursing is essential to successful protocol management. G. The RCP recognizes the value of the team approach in meeting the patient's needs. Nursing input regarding the patient's pulmonary condition will be sought as needed. IX. Reportable conditions: The RCP will inform the physician if: 1. There are acute changes in patient's respiratory status. 2. The therapist is unable to determine appropriate care plan upon assessment. 3. The patient fails to reach therapeutic objective. 4. A change or additional medication is needed. X.  Patient Education:   
D.  Patient will receive instruction on the followin. The treatment modality, including objectives and proper technique of therapy 2. Respiratory medications E. Documentation shall occur in the patient education section of the patient's EMR. XI. Documentation: Record all findings as described above in the patient's EMR. Related Protocols: A. Aerosolized Medication Protocol B. Bronchial Hygiene C. Oxygen Protocol D. RU Fast Track Weaning Protocol E. Asthma Treatment protocol ER 
F. Alpha-1 antitrypsin Deficiency Protocol G. Prone Positioning Protocol H. Respiratory Care COPD Protocol I. Home Oxygen assessment Protocol J. Ventilator Weaning Protocols K. Volume Expansion protocol Indications      Frequency          Level A. Aerosol therapy 1.  q4h     Severe SOB, wheezing, unable to sleep 1 2.  qid, q4 wa or q6h   Moderate SOB, wheezing   2 3.  tid      Hx of asthma, or COPD mild wheezing,  
      or facilitate secretion removal              3 
 4.  bid      Asthma, or COPD, Intermittent wheezing 4 5. PRN, i.e. tid PRN, qid PRN Asthma, or COPD, occasional wheezing 5 B. Bronchopulmonary Hygiene 1. q4h             Copious secretions, SOB, unable to sleep 1 2. qid & PRN            Moderate amounts of secretions   2 3. tid           Small amounts of secretions and poor cough,   
           history of secretions    3 4. PRN, i.e. tid PRN, qid PRN     Breathing exercises, encourage cough only 4  
  
C. Oxygen Therapy     Follow hospital approved Oxygen Protocol Note: 
qid treatments are due 0800, 1200, 1600, and 2000. tid treatments are due 0800, 1400, and 2000 Q6h treatments are due 0800, 1400, 2000, 0200 Q4 wa teatments are due 0800, 1200, 1600, and 2000. Q4h treatments are due  0800, 1200, 1600, 2000, 0000, and 0400. The Level 1-5 rating system is only to be used as criteria for determining appropriate frequency of therapy.   
 
References:  
[employer] National Standard L    Respiratory Care Department Policy, Procedure and Protocol Guideline Manual, 1995, JENELLE Orosco. L  Therapist Driven Respiratory Care Protocols  A Practitioner's Guide for Criteria-Based Respiratory Care by Heather Sweet M.D., and TARA Olson  The rationale for therapist-driven protocols: an update. Respiratory Care 1998; 84:245-366 L Therapist Driven Respiratory Care Protocols  A Practitioner's Guide for Criteria-Based Respiratory Care by Heather Sweet M.D., and TARA Olson The rationale for therapist-driven protocols: an update. Respiratory Care 1998; [id number]. N   [facility] Clinical Practice Guidelines. D. The RCP will perform a respiratory assessment in the following manner: 1. Perform hand hygiene per hospital policy utilizing Standard Precautions for all patients and following transmission-based isolation as indicated per policy. 2. Identify patient via ID bracelet verifying patient name and birth date. 3. General observations: color, pattern and effort of breathing, chest expansion, (symmetrical and bilateral), level of consciousness and the ability to ambulate. 4. The RCP will assess patients cough ability and determine if Nasotracheal suctioning is needed. If patient is unable to produce sputum, at that time, the RCP should question the patient with regard to their sputum: production, color consistency, frequency and amount. 5. Auscultation: Using a stethoscope, the RCP will listen and note quality of breath sounds and presence or absence of adventitious breath sounds in all lung fields, both anteriorly and posteriorly. 6. Upon completing the EMR review and physical assessment, the RCP will document findings in the RT Assessment section of the EMR. The score level will be provided and will be used to determine the frequency of therapy. V. Indications: A. Indications for Bronchial Hygiene Protocol will include: 1.  Potential for or presence of atelectasis. 2. Need for hydration and removal of retained secretions. 3. Need for improvement of cough effectiveness. 4. Presence of conditions associated with disorder of pulmonary clearance: 
a. Cystic fibrosis b. Bronchiectasis B. Indications for Aerosolized Medication(s) Protocol should include: 1. Treatment of bronchospasm/wheezing 2. Improvement of mucociliary clearance 3. Treatment of stridor 4. History of Asthma or COPD 
           C.  Indications for Oxygen Therapy Protocol should include: 1. Documented hypoxemia 2. Severe trauma 3. Acute myocardial infarction 4. Short-term therapy (e.g. post anesthesia recovery) VI. Maintenance:   
D. Timely patient assessment is an integral part of this protocol therefore the following will be applied: 1. All non- critical care patients will be evaluated upon receiving initial respiratory care orders within 24 hours and re-evaluated within 48 hours (or more as needed). 2. Orders requesting a Respiratory Consult will be responded to in the following manner: 
a. In patient emergency situations, the RCP assigned to the floor will respond immediately to the patient, provide an initial respiratory assessment, and contact the patients physician as necessary for appropriate orders. b. In non-emergent situations, the RCP assigned to the floor will respond to the patient within 90 minutes and provide an initial respiratory assessment and contact patients physician as necessary for appropriate orders. c. An RCP will provide a comprehensive assessment as soon as possible. 3. Upon completion of an evaluation, the RCP will complete documentation in the patients EMR in the RT Assessment section. 4. The RCP who completes the assessment will document orders for therapy in the orders section of the patients EMR selecting new order.  Next, per protocol should be selected indicating it is a protocol order and sign orders should be selected to complete the process. 5. The Pharmacy and Therapeutics (P&T) Committee has mandated that the medication Xopenex may be changed to unit dose albuterol without an order, except for those patients receiving Xopenex due to cardiac arrhythmias. The dosage for these patients should be 0.63 mg. and may be changed from 1.25 mg. to 0.63 mg per P & T Committee by the RCP completing the assessment. 6. Patients who are not experiencing cardiac arrhythmias, and are ordered Xopenex and Atrovent may be changed to Duoneb. VII. Safety: A. The following safety issues shall be monitored: 1. The RCP will perform hand hygiene per hospital policy utilizing Standard Precautions for all patients and following transmission-based isolation as indicated per hospital policy. 2. The RCP must exercise professional judgment in classifying the patient for frequency of therapy. 3. Appropriate classification of the patient will require an evaluation utilizing the Therapy Assessment Protocol Guidelines. 4. The RCP will confer with the physician concerning the care of the patient at any time questions or problems arise. 5. If during therapy, the patient exhibits no improvement or deterioration in clinical status the RCP will notify the physician and the patients nurse. VIII. Interventions: A. The patients nurse is responsible concerning all items related to his/her care. Ongoing communication with nursing is essential to successful protocol management. B. The RCP recognizes the value of the team approach in meeting the patients needs. Nursing input regarding the patients pulmonary condition will be sought as needed. IX. Reportable conditions: The RCP will inform the physician if: 1. There are acute changes in patients respiratory status. 2. The therapist is unable to determine appropriate care plan upon assessment. 3. The patient fails to reach therapeutic objective.  
4. A change or additional medication is needed. X.  Patient Education: A. Patient will receive instruction on the followin. The treatment modality, including objectives and proper technique of therapy 2. Respiratory medications B. Documentation shall occur in the patient education section of the patients EMR. XI. Documentation: Record all findings as described above in the patients EMR. Related Protocols: A. Aerosolized Medication Protocol B. Bronchial Hygiene C. Oxygen Protocol D. Volume Expansion/Secretion Clearance E. Ventilator Weaning Protocols References: 
[address] Standard L    Respiratory Care Department Policy, Procedure and Protocol Guideline Manual, , JENELLE Orosco. L  Therapist Driven Respiratory Care Protocols  A Practitioners Guide for Criteria-Based Respiratory Care by Nicholas Humphries M.D., and JENELLE Funes RRT. L  The rationale for therapist-driven protocols: an update. Respiratory Care 1998; 72:068-851 N   [facility] Clinical Practice Guidelines. Respiratory Care Services Policy Number: [id number] Title: Aerosolized Medication Protocol Effective Date: 10/1998 Revised Date: , , ,  Reviewed Date: / 03/15 , , , 2020 I. Policy: The Aerosolized Medication Protocol shall by implemented by Respiratory Care Practitioners (RCP) for patients with orders to receive aerosol therapy with medication. II. Purpose: To open and maintain obstructed airways, the RCP, will utilize the following  
protocol to select the indicated aerosolized medication(s) and determine the most effective method of delivery to the patient. III. Patient Type: All patients who are determined to meet aerosolized medication criteria as  
       outlined in this protocol. IV.  Responsibility: Director, [address], registered Respiratory Care Practitioners (RCP's) with documented competency in the performance of respiratory therapeutic techniques. V. Equipment needed: Gita Wright I. Pulse oximeter J. AeroEclipse nebulizer K. Meter Dose Inhaler (MDI) VI. Protocol:  
G. The following conditions are accepted indications for aerosolized medication therapy. 5. Bronchospasm/wheezing 6. Impaired mucociliary clearance 7. Tracheobronchial mucosal congestion/and laryngeal stridor 8. Diseases which commonly require aerosolized medication therapy include, but are not limited to: 
f. Asthma/reactive airway disease 
g. Bronchitis/emphysema (COPD) h. Cystic fibrosis 
i. Severe laryngitis/tracheitis 
j. Bronchiectasis 
k. Smoke inhalation or chemical trauma to the lung or upper airway 
l. Physical trauma to the upper airway 
m. Laryngotracheobronchitis 
n. Bronchiolitis 
o. Non-specific wheezing B. Indications for bronchodilator medications will include: 
d. Bronchospasm/ wheezing 
e. Asthma/reactive airway disease 
f. Chronic obstructive pulmonary disease 
g. Obstructive defect on pulmonary function testing C. Administration of medications 5. If a bronchodilator or any other type of respiratory medication is needed, a physician order must be indicated in the medication section in the patient's EMR. 6. When the physician specifies the medication and dosage at the time of request, the ordered medication will be used as part of the care plan. D. The following guidelines will be utilized in the evaluation and selection of the appropriate delivery device for indicated medication(s): 1. MDI is the preferred method of medication delivery via common canister. a. Patient should be alert/cooperative 
b. Able to perform 3 second breath hold. c. Patient may be changed to self-administer as appropriate. d. Patients in isolation will be provided an individual inhaler. 2. Unassisted aerosol (UA) is indicated if 
c. Ventilation is inadequate 
d.  Patient is unable to perform MDI appropriately VII. Guidelines:  
Monitor patient's vital signs and evaluate patient's clinical status. The need to change medication and/or modality may be indicated by: 5. A pulse greater than 120 bpm, or if a pulse increase of 20 bpm occurs with bronchodilator medications. 6. Significant worsening of dyspnea or wheezing occurring during or within 30 minutes of discontinuing therapy. 7. Worsening of patient's sensorium (e.g. patient becomes confused or obtunded, and unable to follow directions). 8. Worsening of patient's chest x-ray. 9. Change in sputum (e.g. increased pulmonary infiltrate, which might indicate need for volume expansion therapy). 10. Patient has difficulty coughing up secretions, which might indicate need for acetylcysteine and/or bronchial hygiene therapy. 11. Call physician immediately if dyspnea worsens and is not responsive to modifications allowed by protocol. VIII. Clinical Responsibility: 
I. The therapy assessment guidelines will be used to evaluate all patients receiving aerosolized medications with the exception of critical care areas. 2. RCP's will perform changes in therapy per protocol. 2. It will be the responsibility of RCP to provide instruction regarding respiratory medications, possible side effects, aerosol therapy and proper MDI technique, as well as, spacer usage to patients ordered MDI therapy. 2. Current therapy that is part of a patient's home regimen will not be discontinued. a. Provide spacer and educate patient on proper inhaler technique if needed. IX. Documentation D. Document assessment findings in the respiratory assessment section of the patient's EMR. E. Document changes in therapy per protocol in the respiratory orders section and in the care plan section of the patient's EMR. F. Document patient education in the patient education section of the patient's EMR. X. Outcome Criteria: 
J. Relief of wheezes and obstruction K.  Improved cough and sputum color and consistency L. Improved chest x-ray M. Improved arterial oxygen tension and or SaO2 
N. Improved Peak Flow on asthmatic patients XI. Related Policy/Protocols: H. Respiratory Patient Care Protocols I. Bronchial Hygiene Therapy J. Oxygen Protocol K. [facility] Administration of Metered Dose Inhalers via a Common Canister [facility] Clinical Resources [facility] Aerosol Generating Procedures Reference: L - Respiratory Care Department Policy, Procedure and Protocol Guideline Manual, 1995, JENELLE Orosco. L -  Therapist Driven Respiratory Care Protocols  A Practitioner's Guide for Criteria-Based Respiratory Care by Stacey Agrawal M.D., and JENELLE Moss, YUN. L - The rationale for therapist-driven protocols: an update. Respiratory Care 1998; [id number]. N -[facility] Clinical Practice Guidelines. Respiratory Care Services Policy Number: [id number] Title: Bronchial Hygiene Protocol Effective Date: 01/1999 Revised Date: 12/2014, 11/2017, 7/2019 Reviewed Date: 06/2013, 05/2014, 03/2015, 03/2016, 06/2017, 4/2018, 11/2020 I. Purpose: The Respiratory Care Practitioner (RCP) will utilize the following protocol to select and initiate bronchial hygiene therapy to open and maintain obstructed airways when indicated. II. Patients: All patients who are ordered bronchial hygiene therapy. III. Clinical Area: All general patient floors IV. Protocol: The following conditions or diseases are indications for bronchial hygiene  
                        therapy. L. Oscillating PEP Therapy Indications should include:  
9. Atelectasis caused by mucus plugging or foreign body 10. Chronic mucociliary clearance disorders 11. Retained secretions which may be associated with the following conditions: 
p. Bronchitis 
q. Bronchiectasis 
r. Pneumonia M. PAP- Positive airway pressure therapy Indications include: 7.  Patients with post-operative atelectasis or to prevent post operative atelectasis. 8. Patients who cannot perform deep breathing exercises due to pain. 9. Patients requiring lung expansion therapy who cannot follow instructions. 10. Patients requiring lung expansion therapy with poor inspiratory capacity <10cc/kg. 11. Patients requiring aerosol therapy in conjunction with opening their airways. N. Vibratory / Acoustical Airway Clearance Therapy (ACT)- i.e. (Vibralung, Vest, or Percussor) Indications should include 12. Patient conditions  that involve retained secretions, increased mucus production and defective mucociliary clearance such as: 
h. Cystic fibrosis 
i. Chronic bronchitis 
j. Bronchiectasis 
k. Pneumonia 
l. Asthma 
m. Muscular dystrophy 
n. Post-operative atelectasis 
o. Neuromuscular respiratory impairments 
p. ACT may be considered in patients with COPD with 
symptomatic secretion retention, guided by patient preference, 
toleration, and effectiveness of therapy Brandy Wagner et al., 2013). O. Nasotracheal suctioning indications should include: 
5. Inability to cough effectively 6. Excessive secretions 7. Artificial airway V. Equipment: A. PEP therapy device B. Vest therapy equipment Eura Naegeli D. Zada Nipple Gabriela Birmingham F. NT suction equipment VI. Guidelines:   Monitor patient's vital signs and evaluate patient's clinical status. The need to  
                             change therapy modality may be indicated by: 
Al Orozco in patient's sensorium (patient now confused or obtunded, and unable to follow directions). I. A significant deterioration is evident on patient's chest radiograph or increased sputum production. J. Increased thickening of secretions (e.g. mucolytic therapy may be indicated.) K. Development of wheezing L. Decrease in oxygen saturation M. Development of chest pain. VII.    Clinical Responsibility: The Therapy Assessment Protocol guidelines will be used to  
             re-evaluate all patients on bronchial hygiene therapy (See Therapy Assessment Protocol). J. RCP's will perform changes in therapy according to protocol. Karis DIOR. Bronchial hygiene therapy may be discontinued when goals of therapy are met, e.g., secretions easily expectorated for 48 hours, atelectasis is resolved, etc. 
L. PAP Therapy may be utilized in place of IPPB therapy per discretion of the RCP, as approved by the Pulmonary Medicine and [facility]. VIII. Outcome Criteria:  Outcome criteria for bronchial hygiene therapy should include: O. Decrease in sputum production P. Improved breath sounds Q. Improved arterial oxygen tension and/or SaO2 
R. Improved chest X-ray S. Subjective response to therapy IX. Documentation G. Document assessment findings in the respiratory assessment section of the patient's EMR. H. Document changes in therapy per protocol in the respiratory orders section and in the care plan section of the patient's EMR. I. Document patient education in the patient education section of the patient's EMR. X. Related Protocols: 3. Respiratory Patient Care Assessment Protocols 3. Aerosolized Medication Protocol 3. Oxygen Therapy Protocol Reference: L - Respiratory Care Department Policy, Procedure and Protocol Guideline Manual, 1995, JENELLE Orosco. L - Therapist Driven Respiratory Care Protocols  A Practitioner's Guide for Criteria-Based Respiratory Care by Venkat Reza M.D., and JENELLE Paul RRT. N  [facility] Clinical Practice Guidelines. Marjorie Severe., Klever Rosenberg., Valeria Gamez., Nemesio Elaine., Carlos Wise., . . . NEIL Reece (2013, December). [facility] Clinical Practice Guideline: Effectiveness of Nonpharmacologic Airway Clearance Therapies in Hospitalized Patients. Respiratory Care, 58(12), 6522-9948. Retrieved June 28, 2019

## 2021-03-21 NOTE — PROGRESS NOTES
Explained to pt the importance of her wearing the CPAP and she refused to wear. Offered pt Ativan to help relieve anxiety and she still refused to wear CPAP.

## 2021-03-21 NOTE — PROGRESS NOTES
Problem: Breathing Pattern - Ineffective  Goal: *Absence of hypoxia  Outcome: Progressing Towards Goal  Goal: *Use of effective breathing techniques  Outcome: Progressing Towards Goal  Goal: *PALLIATIVE CARE:  Alleviation of Dyspnea  Outcome: Progressing Towards Goal     Problem: Patient Education: Go to Patient Education Activity  Goal: Patient/Family Education  Outcome: Progressing Towards Goal     Problem: Falls - Risk of  Goal: *Absence of Falls  Description: Document Nikia Fall Risk and appropriate interventions in the flowsheet. Outcome: Progressing Towards Goal  Note: Fall Risk Interventions:            Medication Interventions: Patient to call before getting OOB, Teach patient to arise slowly                   Problem: Patient Education: Go to Patient Education Activity  Goal: Patient/Family Education  Outcome: Progressing Towards Goal     Problem: Risk for Spread of Infection  Goal: Prevent transmission of infectious organism to others  Description: Prevent the transmission of infectious organisms to other patients, staff members, and visitors.   Outcome: Progressing Towards Goal     Problem: Patient Education:  Go to Education Activity  Goal: Patient/Family Education  Outcome: Progressing Towards Goal     Problem: Patient Education: Go to Patient Education Activity  Goal: Patient/Family Education  Outcome: Progressing Towards Goal     Problem: Patient Education: Go to Patient Education Activity  Goal: Patient/Family Education  Outcome: Progressing Towards Goal

## 2021-03-22 ENCOUNTER — APPOINTMENT (OUTPATIENT)
Dept: GENERAL RADIOLOGY | Age: 61
DRG: 177 | End: 2021-03-22
Attending: INTERNAL MEDICINE
Payer: COMMERCIAL

## 2021-03-22 LAB
ALBUMIN SERPL-MCNC: 3.5 G/DL (ref 3.5–5)
ALBUMIN/GLOB SERPL: 1 {RATIO} (ref 1.2–3.5)
ALP SERPL-CCNC: 76 U/L (ref 50–136)
ALT SERPL-CCNC: 33 U/L (ref 12–65)
ANION GAP SERPL CALC-SCNC: 6 MMOL/L (ref 7–16)
AST SERPL-CCNC: 17 U/L (ref 15–37)
BASOPHILS # BLD: 0 K/UL (ref 0–0.2)
BASOPHILS NFR BLD: 0 % (ref 0–2)
BILIRUB SERPL-MCNC: 0.4 MG/DL (ref 0.2–1.1)
BUN SERPL-MCNC: 23 MG/DL (ref 6–23)
CALCIUM SERPL-MCNC: 9.5 MG/DL (ref 8.3–10.4)
CHLORIDE SERPL-SCNC: 107 MMOL/L (ref 98–107)
CO2 SERPL-SCNC: 29 MMOL/L (ref 21–32)
CREAT SERPL-MCNC: 0.55 MG/DL (ref 0.6–1)
CRP SERPL HS-MCNC: 11.2 MG/L
D DIMER PPP FEU-MCNC: 0.46 UG/ML(FEU)
DIFFERENTIAL METHOD BLD: ABNORMAL
EOSINOPHIL # BLD: 0 K/UL (ref 0–0.8)
EOSINOPHIL NFR BLD: 0 % (ref 0.5–7.8)
ERYTHROCYTE [DISTWIDTH] IN BLOOD BY AUTOMATED COUNT: 12.1 % (ref 11.9–14.6)
GLOBULIN SER CALC-MCNC: 3.6 G/DL (ref 2.3–3.5)
GLUCOSE SERPL-MCNC: 85 MG/DL (ref 65–100)
HCT VFR BLD AUTO: 37.7 % (ref 35.8–46.3)
HGB BLD-MCNC: 12.1 G/DL (ref 11.7–15.4)
IMM GRANULOCYTES # BLD AUTO: 0.1 K/UL (ref 0–0.5)
IMM GRANULOCYTES NFR BLD AUTO: 1 % (ref 0–5)
LYMPHOCYTES # BLD: 1 K/UL (ref 0.5–4.6)
LYMPHOCYTES NFR BLD: 13 % (ref 13–44)
MCH RBC QN AUTO: 28.8 PG (ref 26.1–32.9)
MCHC RBC AUTO-ENTMCNC: 32.1 G/DL (ref 31.4–35)
MCV RBC AUTO: 89.8 FL (ref 79.6–97.8)
MONOCYTES # BLD: 0.8 K/UL (ref 0.1–1.3)
MONOCYTES NFR BLD: 10 % (ref 4–12)
NEUTS SEG # BLD: 5.8 K/UL (ref 1.7–8.2)
NEUTS SEG NFR BLD: 76 % (ref 43–78)
NRBC # BLD: 0 K/UL (ref 0–0.2)
PLATELET # BLD AUTO: 328 K/UL (ref 150–450)
PMV BLD AUTO: 9.3 FL (ref 9.4–12.3)
POTASSIUM SERPL-SCNC: 3.7 MMOL/L (ref 3.5–5.1)
PROT SERPL-MCNC: 7.1 G/DL (ref 6.3–8.2)
RBC # BLD AUTO: 4.2 M/UL (ref 4.05–5.2)
SODIUM SERPL-SCNC: 142 MMOL/L (ref 136–145)
TSH SERPL DL<=0.005 MIU/L-ACNC: 0.54 UIU/ML (ref 0.36–3.74)
WBC # BLD AUTO: 7.6 K/UL (ref 4.3–11.1)

## 2021-03-22 PROCEDURE — 74011000258 HC RX REV CODE- 258: Performed by: HOSPITALIST

## 2021-03-22 PROCEDURE — 94762 N-INVAS EAR/PLS OXIMTRY CONT: CPT

## 2021-03-22 PROCEDURE — 74011250636 HC RX REV CODE- 250/636: Performed by: INTERNAL MEDICINE

## 2021-03-22 PROCEDURE — 85379 FIBRIN DEGRADATION QUANT: CPT

## 2021-03-22 PROCEDURE — 65270000029 HC RM PRIVATE

## 2021-03-22 PROCEDURE — 94640 AIRWAY INHALATION TREATMENT: CPT

## 2021-03-22 PROCEDURE — P9047 ALBUMIN (HUMAN), 25%, 50ML: HCPCS | Performed by: INTERNAL MEDICINE

## 2021-03-22 PROCEDURE — 74011000250 HC RX REV CODE- 250: Performed by: HOSPITALIST

## 2021-03-22 PROCEDURE — 84443 ASSAY THYROID STIM HORMONE: CPT

## 2021-03-22 PROCEDURE — 71045 X-RAY EXAM CHEST 1 VIEW: CPT

## 2021-03-22 PROCEDURE — 74011250636 HC RX REV CODE- 250/636: Performed by: HOSPITALIST

## 2021-03-22 PROCEDURE — 74011000258 HC RX REV CODE- 258: Performed by: INTERNAL MEDICINE

## 2021-03-22 PROCEDURE — 2709999900 HC NON-CHARGEABLE SUPPLY

## 2021-03-22 PROCEDURE — 77010033711 HC HIGH FLOW OXYGEN

## 2021-03-22 PROCEDURE — 36415 COLL VENOUS BLD VENIPUNCTURE: CPT

## 2021-03-22 PROCEDURE — 97530 THERAPEUTIC ACTIVITIES: CPT

## 2021-03-22 PROCEDURE — 80053 COMPREHEN METABOLIC PANEL: CPT

## 2021-03-22 PROCEDURE — 74011250637 HC RX REV CODE- 250/637: Performed by: INTERNAL MEDICINE

## 2021-03-22 PROCEDURE — 74011250637 HC RX REV CODE- 250/637: Performed by: HOSPITALIST

## 2021-03-22 PROCEDURE — 85025 COMPLETE CBC W/AUTO DIFF WBC: CPT

## 2021-03-22 PROCEDURE — 86141 C-REACTIVE PROTEIN HS: CPT

## 2021-03-22 RX ORDER — FUROSEMIDE 20 MG/1
20 TABLET ORAL DAILY
Status: DISCONTINUED | OUTPATIENT
Start: 2021-03-22 | End: 2021-03-22

## 2021-03-22 RX ORDER — FUROSEMIDE 20 MG/1
20 TABLET ORAL ONCE
Status: COMPLETED | OUTPATIENT
Start: 2021-03-22 | End: 2021-03-22

## 2021-03-22 RX ORDER — ALBUMIN HUMAN 250 G/1000ML
25 SOLUTION INTRAVENOUS ONCE
Status: COMPLETED | OUTPATIENT
Start: 2021-03-22 | End: 2021-03-22

## 2021-03-22 RX ADMIN — FAMOTIDINE 20 MG: 20 TABLET, FILM COATED ORAL at 17:10

## 2021-03-22 RX ADMIN — Medication 10 ML: at 06:15

## 2021-03-22 RX ADMIN — FAMOTIDINE 20 MG: 20 TABLET, FILM COATED ORAL at 08:33

## 2021-03-22 RX ADMIN — CEFTRIAXONE SODIUM 1 G: 1 INJECTION, POWDER, FOR SOLUTION INTRAMUSCULAR; INTRAVENOUS at 11:00

## 2021-03-22 RX ADMIN — Medication 10 ML: at 21:35

## 2021-03-22 RX ADMIN — Medication 220 MG: at 08:33

## 2021-03-22 RX ADMIN — DEXAMETHASONE 6 MG: 4 TABLET ORAL at 08:33

## 2021-03-22 RX ADMIN — BUDESONIDE AND FORMOTEROL FUMARATE DIHYDRATE 2 PUFF: 160; 4.5 AEROSOL RESPIRATORY (INHALATION) at 09:52

## 2021-03-22 RX ADMIN — AZITHROMYCIN MONOHYDRATE 250 MG: 250 TABLET ORAL at 08:33

## 2021-03-22 RX ADMIN — LORAZEPAM 1 MG: 1 TABLET ORAL at 08:00

## 2021-03-22 RX ADMIN — LEVOTHYROXINE SODIUM 50 MCG: 0.05 TABLET ORAL at 07:24

## 2021-03-22 RX ADMIN — ENOXAPARIN SODIUM 40 MG: 40 INJECTION SUBCUTANEOUS at 08:33

## 2021-03-22 RX ADMIN — CHOLECALCIFEROL TAB 125 MCG (5000 UNIT) 5000 UNITS: 125 TAB at 08:33

## 2021-03-22 RX ADMIN — TIOTROPIUM BROMIDE INHALATION SPRAY 2 PUFF: 3.12 SPRAY, METERED RESPIRATORY (INHALATION) at 09:53

## 2021-03-22 RX ADMIN — OXYCODONE HYDROCHLORIDE AND ACETAMINOPHEN 1000 MG: 500 TABLET ORAL at 08:32

## 2021-03-22 RX ADMIN — ALBUMIN (HUMAN) 25 G: 0.25 INJECTION, SOLUTION INTRAVENOUS at 09:04

## 2021-03-22 RX ADMIN — ENOXAPARIN SODIUM 40 MG: 40 INJECTION SUBCUTANEOUS at 21:35

## 2021-03-22 RX ADMIN — BUDESONIDE AND FORMOTEROL FUMARATE DIHYDRATE 2 PUFF: 160; 4.5 AEROSOL RESPIRATORY (INHALATION) at 20:00

## 2021-03-22 RX ADMIN — OXYCODONE HYDROCHLORIDE AND ACETAMINOPHEN 1000 MG: 500 TABLET ORAL at 16:35

## 2021-03-22 RX ADMIN — ROSUVASTATIN CALCIUM 20 MG: 20 TABLET, COATED ORAL at 21:35

## 2021-03-22 RX ADMIN — LORAZEPAM 1 MG: 1 TABLET ORAL at 20:32

## 2021-03-22 RX ADMIN — OXYCODONE HYDROCHLORIDE AND ACETAMINOPHEN 1000 MG: 500 TABLET ORAL at 21:35

## 2021-03-22 RX ADMIN — REMDESIVIR 100 MG: 100 INJECTION, POWDER, LYOPHILIZED, FOR SOLUTION INTRAVENOUS at 10:03

## 2021-03-22 RX ADMIN — Medication 10 ML: at 13:33

## 2021-03-22 RX ADMIN — SERTRALINE 50 MG: 50 TABLET, FILM COATED ORAL at 08:34

## 2021-03-22 RX ADMIN — FUROSEMIDE 20 MG: 20 TABLET ORAL at 12:01

## 2021-03-22 NOTE — PROGRESS NOTES
[facility] Hospitalist Note     Admit Date:  3/17/2021 10:20 PM   Name:  Brenda John   Age:  62 y.o.  :  1960   MRN:  [id number]   PCP:  Whitney De La Fuente MD  Treatment Team: Attending Provider: Ariana Patterson MD; Care Manager: Marialuisa Hand [facility]; Primary Nurse: Mary Roche, RN; Primary Nurse: David Brito, RN; Primary Nurse: Teodora Hernandez; Utilization Review: aPstora Santana RN; Physical Therapy Assistant: Reece Lal PTA; Occupational Therapist: Micky Reese, OTR/L    HPI/Subjective:     Chief Complaint : Positive For Covid-19    Subjective:  Patient 69-year-old female with a PMH of HTN, HL, Vit D deficiency who was diagnosed with COVID-19 on  in the emergency room, discharged home with a portable oximeter, who presented again on 3/17 with increasing SOB and hypoxia. Patient found oxygen saturation to be in the low 80s, with symptoms of shortness of breath, EMS was called, on arrival patient saturation 79, requires 4 L nasal cannula, patient also complaining of fevers, chills, fatigue, body ache, nausea, diarrhea, lower back pain, nonproductive cough. She was admitted for further care. 3/22: Patient's on Airvo 50LPM   Anxiety improved on Zoloft  Labs pending from AM ( a recurring issue, I've brought up multiple times)  -Encouraged incentive spirometer, reassured   Denies CP, denies LE edema. Assessment and Plan:     Principal Problem:    Acute respiratory failure due to COVID-19 -Airvo  -   C/w Decadron, continue remdesivir; she received convalescent plasma . Antibiotics.  -Incentive spirometry, inhalers added  - cont to monitor renal function, LFTs  -CRP/Dimer are not indicating impending CRS  -3/20: obtain neg fluid balance  -3/21: lungs sound better, c/w current mgmt  -3/22: attempt drying up lung with albumin and gentle diuretic lasix 20mg x 1; increase incentive spirometer use to 10x per day.  AM labs still pending    Active Problems: Essential hypertension, benign - dc lisinopril      Hypothyroidism, acquired, autoimmune - cont Synthroid  -3/20: TSH suppressed, possibly s/t acute illness. Need to rpt as OP      Hyperlipidemia with target low density lipoprotein (LDL) cholesterol less than 130 mg/dL -Cont crestor. Vitamin D deficiency - cont supplementation    DC planning/Dispo:  Await less O2 need. Diet:  DIET REGULAR  DVT ppx:  Lovenox  Care d/w patient, care team, Dr. Serena Painting    Signed:  Manuelito Odom MD        Objective:     Patient Vitals for the past 24 hrs:   Temp Pulse Resp BP SpO2   03/22/21 0733 99 °F (37.2 °C) 73 17 127/80 93 %   03/22/21 0342 98.6 °F (37 °C) 78 20 122/73 96 %   03/22/21 0257     91 %   03/21/21 2338 98.6 °F (37 °C) 75 20 119/72 98 %   03/21/21 2319     97 %   03/21/21 2019     91 %   03/21/21 1925 98.5 °F (36.9 °C) 75 20 110/62 92 %   03/21/21 1534 98.6 °F (37 °C) 76 18 105/60 98 %   03/21/21 1509     96 %   03/21/21 1130     94 %   03/21/21 1129 97.7 °F (36.5 °C) 68 18 112/68 96 %     Oxygen Therapy  O2 Sat (%): 93 % (03/22/21 0733)  Pulse via Oximetry: 78 beats per minute (03/21/21 1509)  O2 Device: Heated; Hi flow nasal cannula (03/22/21 0330)  Skin Assessment: Clean, dry, & intact (03/20/21 0238)  Skin Protection for O2 Device: No (03/19/21 0747)  O2 Flow Rate (L/min): 50 l/min (03/22/21 0330)  O2 Temperature: 87.8 °F (31 °C) (03/22/21 0257)  FIO2 (%): 80 % (03/22/21 0330)  Intake and Output:  Date 03/21/21 0700 - 03/22/21 0659 03/22/21 0700 - 03/23/21 0659   Shift [id number] 1900-0659 24 Hour Total 0700-1859 1900-0659 24 Hour Total   INTAKE   Shift Total(mL/kg)         OUTPUT   Urine(mL/kg/hr)           Urine Occurrence(s) 1 x 1 x 2 x      Stool           Stool Occurrence(s) 4 x  4 x      Shift Total(mL/kg)         NET         Weight (kg) 84.6 84.6 84.6 84.6 84.6 84.6         Physical Exam:    General:   Well nourished. No overt distress  HEENT:  PERRLA  CV:  +S1/S2, RRR. No M/R/G.   No edema noted b/l LEs  Lungs:  + bibasilar crackles noted  Abdomen: +BS, soft, nontender, nondistended. Extremities: Warm and dry. No cyanosis   Skin:  No rashes or cyanosis noted  Neuro:  No gross focal deficits. Psych:  Non-focal. Alert and oriented to person, time, place and situation    Data Reviewed:  I have reviewed all labs, meds, and studies from the last 24 hours:  Recent Results (from the past 24 hour(s))   CBC WITH AUTOMATED DIFF    Collection Time: 03/21/21  9:41 AM   Result Value Ref Range    WBC 8.8 4.3 - 11.1 K/uL    RBC 4.16 4.05 - 5.2 M/uL    HGB 12.1 11.7 - 15.4 g/dL    HCT 37.5 35.8 - 46.3 %    MCV 90.1 79.6 - 97.8 FL    MCH 29.1 26.1 - 32.9 PG    MCHC 32.3 31.4 - 35.0 g/dL    RDW 12.4 11.9 - 14.6 %    PLATELET 371 736 - 436 K/uL    MPV 9.3 (L) 9.4 - 12.3 FL    ABSOLUTE NRBC 0.00 0.0 - 0.2 K/uL    DF AUTOMATED      NEUTROPHILS 79 (H) 43 - 78 %    LYMPHOCYTES 11 (L) 13 - 44 %    MONOCYTES 10 4.0 - 12.0 %    EOSINOPHILS 0 (L) 0.5 - 7.8 %    BASOPHILS 0 0.0 - 2.0 %    IMMATURE GRANULOCYTES 1 0.0 - 5.0 %    ABS. NEUTROPHILS 6.9 1.7 - 8.2 K/UL    ABS. LYMPHOCYTES 0.9 0.5 - 4.6 K/UL    ABS. MONOCYTES 0.9 0.1 - 1.3 K/UL    ABS. EOSINOPHILS 0.0 0.0 - 0.8 K/UL    ABS. BASOPHILS 0.0 0.0 - 0.2 K/UL    ABS. IMM.  GRANS. 0.0 0.0 - 0.5 K/UL   CRP, HIGH SENSITIVITY    Collection Time: 03/21/21  9:41 AM   Result Value Ref Range    CRP, High sensitivity 18.0 mg/L   D DIMER    Collection Time: 03/21/21  9:41 AM   Result Value Ref Range    D DIMER 0.53 <0.56 ug/ml(FEU)   METABOLIC PANEL, COMPREHENSIVE    Collection Time: 03/21/21  9:41 AM   Result Value Ref Range    Sodium 140 136 - 145 mmol/L    Potassium 3.6 3.5 - 5.1 mmol/L    Chloride 105 98 - 107 mmol/L    CO2 30 21 - 32 mmol/L    Anion gap 5 (L) 7 - 16 mmol/L    Glucose 95 65 - 100 mg/dL    BUN 22 6 - 23 MG/DL    Creatinine 0.63 0.6 - 1.0 MG/DL    GFR est AA >60 >60 ml/min/1.73m2    GFR est non-AA >60 >60 ml/min/1.73m2    Calcium 9.2 8.3 - 10.4 MG/DL Bilirubin, total 0.4 0.2 - 1.1 MG/DL    ALT (SGPT) 34 12 - 65 U/L    AST (SGOT) 27 15 - 37 U/L    Alk.  phosphatase 77 50 - 136 U/L    Protein, total 7.4 6.3 - 8.2 g/dL    Albumin 3.8 3.5 - 5.0 g/dL    Globulin 3.6 (H) 2.3 - 3.5 g/dL    A-G Ratio 1.1 (L) 1.2 - 3.5         Current Meds:  Current Facility-Administered Medications   Medication Dose Route Frequency    albumin human 25% (BUMINATE) solution 25 g  25 g IntraVENous ONCE    loperamide (IMODIUM) capsule 4 mg  4 mg Oral Q6H PRN    sertraline (ZOLOFT) tablet 50 mg  50 mg Oral DAILY    cefTRIAXone (ROCEPHIN) 1 g in 0.9% sodium chloride (MBP/ADV) 50 mL MBP  1 g IntraVENous Q24H    zinc sulfate (ZINCATE) 50 mg zinc (220 mg) capsule 220 mg  220 mg Oral DAILY    ascorbic acid (vitamin C) (VITAMIN C) tablet 1,000 mg  1,000 mg Oral TID    LORazepam (ATIVAN) tablet 1 mg  1 mg Oral BID PRN    sodium chloride (OCEAN) 0.65 % nasal squeeze bottle 2 Spray  2 Spray Both Nostrils Q2H PRN    busPIRone (BUSPAR) tablet 15 mg  15 mg Oral BID PRN    budesonide-formoteroL (SYMBICORT) 160-4.5 mcg/actuation HFA inhaler 2 Puff  2 Puff Inhalation BID RT    tiotropium bromide (SPIRIVA RESPIMAT) 2.5 mcg /actuation  2 Puff Inhalation DAILY    dexAMETHasone (DECADRON) tablet 6 mg  6 mg Oral DAILY    levothyroxine (SYNTHROID) tablet 50 mcg  50 mcg Oral ACB    baclofen (LIORESAL) tablet 5 mg  5 mg Oral Q12H PRN    [Held by provider] lisinopriL (PRINIVIL, ZESTRIL) tablet 20 mg  20 mg Oral DAILY    rosuvastatin (CRESTOR) tablet 20 mg  20 mg Oral QHS    sodium chloride (NS) flush 5-40 mL  5-40 mL IntraVENous Q8H    sodium chloride (NS) flush 5-40 mL  5-40 mL IntraVENous PRN    acetaminophen (TYLENOL) tablet 650 mg  650 mg Oral Q6H PRN    Or    acetaminophen (TYLENOL) suppository 650 mg  650 mg Rectal Q6H PRN    polyethylene glycol (MIRALAX) packet 17 g  17 g Oral DAILY    senna (SENOKOT) tablet 8.6 mg  1 Tab Oral DAILY PRN    bisacodyL (DULCOLAX) suppository 10 mg  10 mg Rectal DAILY PRN    ondansetron (ZOFRAN) injection 4 mg  4 mg IntraVENous Q6H PRN    famotidine (PEPCID) tablet 20 mg  20 mg Oral BID    enoxaparin (LOVENOX) injection 40 mg  40 mg SubCUTAneous Q12H    guaiFENesin-dextromethorphan (ROBITUSSIN DM) 100-10 mg/5 mL syrup 5 mL  5 mL Oral Q4H PRN    azithromycin (ZITHROMAX) tablet 250 mg  250 mg Oral DAILY    cholecalciferol (VITAMIN D3) (5000 Units/125 mcg) tablet 5,000 Units  5,000 Units Oral DAILY    0.9% sodium chloride infusion 250 mL  250 mL IntraVENous PRN    remdesivir 100 mg in 0.9% sodium chloride 250 mL IVPB  100 mg IntraVENous Q24H       Other Studies:  No results found for this visit on 03/17/21. No results found. All Micro Results     Procedure Component Value Units Date/Time    CULTURE, BLOOD [[id number]] Collected: 03/17/21 2244    Order Status: Completed Specimen: Blood Updated: 03/21/21 1156     Special Requests: --        RIGHT  Antecubital       Culture result: NO GROWTH 3 DAYS       SARS-COV-2, PCR [[id number]]  (Abnormal) Collected: 03/18/21 0110    Order Status: Completed Specimen: Nasopharyngeal Updated: 03/18/21 1048     Specimen source Nasopharyngeal        SARS-CoV-2 Detected        Comment:      The specimen is POSITIVE for SARS-CoV-2, the novel coronavirus associated with COVID-19. This test has been authorized by the FDA under an Emergency Use Authorization (EUA) for use by authorized laboratories.         Fact sheet for Healthcare Providers: ConventionUpdate.co.nz  Fact sheet for Patients: https://fda.gov/media/533186/download       Methodology: RT-PCR  RESULTS VERIFIED, PHONED TO AND READ BACK BY  CAMMIE INIGUEZ ON 3/18/21 @1047, TA         CULTURE, BLOOD [[id number]] Collected: 03/17/21 2245    Order Status: Canceled Specimen: Blood

## 2021-03-22 NOTE — PROGRESS NOTES
Pt having anxiety and requested to have her anxiety medication before bed. Gave pt Buspar 15 mg per MD orders. Will continue to monitor pt.

## 2021-03-22 NOTE — PROGRESS NOTES
ACUTE PHYSICAL THERAPY GOALS:  (Developed with and agreed upon by patient and/or caregiver. )  LTG:  (1.)Ms. Kelly Deras will move from supine to sit and sit to supine, scoot up and down and roll side to side in flat bed with INDEPENDENT within 7 day(s). (2.)Ms. Kelly Deras will ambulate with modified INDEPENDENCE for 75+ feet with the least restrictive/no device within 7 day(s). (3.)Ms. Kelly Deras will perform standing static and dynamic balance activities x 8 minutes with SUPERVISION to improve safety within 7 day(s). PHYSICAL THERAPY: Daily Note and PM Treatment Day # 2    Maricarmen Parents is a 62 y.o. female   PRIMARY DIAGNOSIS: Acute respiratory failure due to COVID-19 ([facility] [address]  COVID-19 [U07.1]         ASSESSMENT:     REHAB RECOMMENDATIONS: CURRENT LEVEL OF FUNCTION:  (Most Recently Demonstrated)   Recommendation to date pending progress:  [address] [place] [address]  Equipment:    To Be Determined Bed Mobility:   Supervision  Sit to Stand:   Supervision  Transfers:   Standby Assistance  Gait/Mobility:  [facility] Standby Assistance     ASSESSMENT:  Ms. Kelly Deras is making steady progress towards PT goals. Patient ambulated 61' without AD but was slightly unsteady. O2 sats drop to 86% during ambulation. Reviewed pursed lipped breathing, LE exercises, and incentive spirometer. Will continue efforts. SUBJECTIVE:   Ms. Kelly Deras states, \"As long as I get to go home. \"    SOCIAL HISTORY/ LIVING ENVIRONMENT: See initial evaluation  Home Environment: Private residence  One/Two Story Residence: One story  Living Alone: No  Support Systems: Family member(s)  OBJECTIVE:     PAIN: VITAL SIGNS: LINES/DRAINS:   Pre Treatment: Pain Screen  Pain Scale 1: FLACC  Pain Intensity 1: 0  Post Treatment: 0   Continuous pulse ox  O2 Device: Humidifier, Hi flow nasal cannula, Heated     MOBILITY: I Mod I S SBA CGA Min Mod Max Total  NT x2 Comments:   Bed Mobility    Rolling [] [] [] [] [] [] [] [] [] [] []    Supine to Sit [] [] [x] [] [] [] [] [] [] [] []    Scooting [] [] [] [] [] [] [] [] [] [] []    Sit to Supine [] [] [x] [] [] [] [] [] [] [] []    Transfers    Sit to Stand [] [] [x] [] [] [] [] [] [] [] []    Bed to Chair [] [] [] [x] [] [] [] [] [] [] []    Stand to Sit [] [] [x] [] [] [] [] [] [] [] []    I=Independent, Mod I=Modified Independent, S=Supervision, SBA=Standby Assistance, CGA=Contact Guard Assistance,   Min=Minimal Assistance, Mod=Moderate Assistance, Max=Maximal Assistance, Total=Total Assistance, NT=Not Tested    BALANCE: Good Fair+ Fair Fair- Poor NT Comments   Sitting Static [x] [] [] [] [] []    Sitting Dynamic [x] [] [] [] [] []              Standing Static [x] [] [] [] [] []    Standing Dynamic [] [x] [] [] [] []      GAIT: I Mod I S SBA CGA Min Mod Max Total  NT x2 Comments:   Level of Assistance [] [] [] [x] [] [] [] [] [] [] []    Distance 60'    DME None    Gait Quality     Weightbearing  Status N/A     I=Independent, Mod I=Modified Independent, S=Supervision, SBA=Standby Assistance, CGA=Contact Guard Assistance,   Min=Minimal Assistance, Mod=Moderate Assistance, Max=Maximal Assistance, Total=Total Assistance, NT=Not Tested    PLAN:   FREQUENCY/DURATION: PT Plan of Care: 3 times/week for duration of hospital stay or until stated goals are met, whichever comes first.  TREATMENT:     TREATMENT:   ($$ Therapeutic Activity: 23-37 mins    )  Therapeutic Activity (23 Minutes): Therapeutic activity included Supine to Sit, Sit to Supine, Scooting, Transfer Training, Ambulation on level ground, Sitting balance  and Standing balance to improve functional Mobility, Strength and Activity tolerance.     TREATMENT GRID:  N/A    AFTER TREATMENT POSITION/PRECAUTIONS:  Bed, Needs within reach and RN notified    INTERDISCIPLINARY COLLABORATION:  RN/PCT    TOTAL TREATMENT DURATION:  PT Patient Time In/Time Out  Time In: 1310  Time Out: [address] [place], ADRIANNE

## 2021-03-22 NOTE — PROGRESS NOTES
Problem: Breathing Pattern - Ineffective  Goal: *Absence of hypoxia  Outcome: Progressing Towards Goal  Goal: *Use of effective breathing techniques  Outcome: Progressing Towards Goal  Goal: *PALLIATIVE CARE:  Alleviation of Dyspnea  Outcome: Progressing Towards Goal     Problem: Patient Education: Go to Patient Education Activity  Goal: Patient/Family Education  Outcome: Progressing Towards Goal     Problem: Risk for Spread of Infection  Goal: Prevent transmission of infectious organism to others  Description: Prevent the transmission of infectious organisms to other patients, staff members, and visitors. Outcome: Progressing Towards Goal     Problem: Patient Education:  Go to Education Activity  Goal: Patient/Family Education  Outcome: Progressing Towards Goal     Problem: Patient Education: Go to Patient Education Activity  Goal: Patient/Family Education  Outcome: Progressing Towards Goal     Problem: Patient Education: Go to Patient Education Activity  Goal: Patient/Family Education  Outcome: Progressing Towards Goal     Problem: Gas Exchange - Impaired  Goal: Absence of hypoxia  Outcome: Progressing Towards Goal  Goal: Promote optimal lung function  Outcome: Progressing Towards Goal     Problem: Risk for Fluid Volume Deficit  Goal: Maintain normal heart rhythm  Outcome: Progressing Towards Goal  Goal: Maintain absence of muscle cramping  Outcome: Progressing Towards Goal  Goal: Maintain normal serum potassium, sodium, calcium, phosphorus, and pH  Outcome: Progressing Towards Goal     Problem: Loneliness or Risk for Loneliness  Goal: Demonstrate positive use of time alone when socialization is not possible  Outcome: Progressing Towards Goal     Problem: Fatigue  Goal: Verbalize increase energy and improved vitality  Outcome: Progressing Towards Goal     Problem: Falls - Risk of  Goal: *Absence of Falls  Description: Document Nikia Fall Risk and appropriate interventions in the flowsheet.   Outcome: Progressing Towards Goal  Note: Fall Risk Interventions:            Medication Interventions: Patient to call before getting OOB, Teach patient to arise slowly                   Problem: Patient Education: Go to Patient Education Activity  Goal: Patient/Family Education  Outcome: Progressing Towards Goal

## 2021-03-22 NOTE — PROGRESS NOTES
Chart screened by CM for d/c planning. Pt is currently requiring 50L O2 heated HFNC Airvo 80%. Wean O2 as tolerated. Progress notes report frequent anxiety. OT eval recommends no further skilled therapy. PT eval recommends TBD based on progress. No immediate needs identified. CM will continue to follow and remain available if any needs arise.

## 2021-03-23 LAB
ANION GAP SERPL CALC-SCNC: 6 MMOL/L (ref 7–16)
BACTERIA SPEC CULT: NORMAL
BASOPHILS # BLD: 0 K/UL (ref 0–0.2)
BASOPHILS NFR BLD: 0 % (ref 0–2)
BUN SERPL-MCNC: 22 MG/DL (ref 6–23)
CALCIUM SERPL-MCNC: 9.1 MG/DL (ref 8.3–10.4)
CHLORIDE SERPL-SCNC: 105 MMOL/L (ref 98–107)
CO2 SERPL-SCNC: 27 MMOL/L (ref 21–32)
CREAT SERPL-MCNC: 0.51 MG/DL (ref 0.6–1)
CRP SERPL HS-MCNC: 7.8 MG/L
D DIMER PPP FEU-MCNC: 0.44 UG/ML(FEU)
DIFFERENTIAL METHOD BLD: ABNORMAL
EOSINOPHIL # BLD: 0 K/UL (ref 0–0.8)
EOSINOPHIL NFR BLD: 0 % (ref 0.5–7.8)
ERYTHROCYTE [DISTWIDTH] IN BLOOD BY AUTOMATED COUNT: 11.9 % (ref 11.9–14.6)
GLUCOSE SERPL-MCNC: 94 MG/DL (ref 65–100)
HCT VFR BLD AUTO: 37.2 % (ref 35.8–46.3)
HGB BLD-MCNC: 11.9 G/DL (ref 11.7–15.4)
IMM GRANULOCYTES # BLD AUTO: 0.1 K/UL (ref 0–0.5)
IMM GRANULOCYTES NFR BLD AUTO: 1 % (ref 0–5)
LYMPHOCYTES # BLD: 0.9 K/UL (ref 0.5–4.6)
LYMPHOCYTES NFR BLD: 12 % (ref 13–44)
MCH RBC QN AUTO: 28.4 PG (ref 26.1–32.9)
MCHC RBC AUTO-ENTMCNC: 32 G/DL (ref 31.4–35)
MCV RBC AUTO: 88.8 FL (ref 79.6–97.8)
MONOCYTES # BLD: 0.7 K/UL (ref 0.1–1.3)
MONOCYTES NFR BLD: 10 % (ref 4–12)
NEUTS SEG # BLD: 5.5 K/UL (ref 1.7–8.2)
NEUTS SEG NFR BLD: 77 % (ref 43–78)
NRBC # BLD: 0 K/UL (ref 0–0.2)
PLATELET # BLD AUTO: 322 K/UL (ref 150–450)
PMV BLD AUTO: 9.2 FL (ref 9.4–12.3)
POTASSIUM SERPL-SCNC: 3.8 MMOL/L (ref 3.5–5.1)
RBC # BLD AUTO: 4.19 M/UL (ref 4.05–5.2)
SERVICE CMNT-IMP: NORMAL
SODIUM SERPL-SCNC: 138 MMOL/L (ref 136–145)
TSH SERPL DL<=0.005 MIU/L-ACNC: 0.7 UIU/ML (ref 0.36–3.74)
WBC # BLD AUTO: 7.2 K/UL (ref 4.3–11.1)

## 2021-03-23 PROCEDURE — 74011250637 HC RX REV CODE- 250/637: Performed by: INTERNAL MEDICINE

## 2021-03-23 PROCEDURE — 86141 C-REACTIVE PROTEIN HS: CPT

## 2021-03-23 PROCEDURE — 94640 AIRWAY INHALATION TREATMENT: CPT

## 2021-03-23 PROCEDURE — 85025 COMPLETE CBC W/AUTO DIFF WBC: CPT

## 2021-03-23 PROCEDURE — 77010033711 HC HIGH FLOW OXYGEN

## 2021-03-23 PROCEDURE — 74011250636 HC RX REV CODE- 250/636: Performed by: HOSPITALIST

## 2021-03-23 PROCEDURE — 74011250637 HC RX REV CODE- 250/637: Performed by: HOSPITALIST

## 2021-03-23 PROCEDURE — 84443 ASSAY THYROID STIM HORMONE: CPT

## 2021-03-23 PROCEDURE — 80048 BASIC METABOLIC PNL TOTAL CA: CPT

## 2021-03-23 PROCEDURE — 74011000258 HC RX REV CODE- 258: Performed by: INTERNAL MEDICINE

## 2021-03-23 PROCEDURE — 65270000029 HC RM PRIVATE

## 2021-03-23 PROCEDURE — 94762 N-INVAS EAR/PLS OXIMTRY CONT: CPT

## 2021-03-23 PROCEDURE — 97535 SELF CARE MNGMENT TRAINING: CPT

## 2021-03-23 PROCEDURE — 85379 FIBRIN DEGRADATION QUANT: CPT

## 2021-03-23 PROCEDURE — 74011250636 HC RX REV CODE- 250/636: Performed by: INTERNAL MEDICINE

## 2021-03-23 RX ORDER — ALBUTEROL SULFATE 0.83 MG/ML
2.5 SOLUTION RESPIRATORY (INHALATION)
Status: DISCONTINUED | OUTPATIENT
Start: 2021-03-23 | End: 2021-03-28 | Stop reason: HOSPADM

## 2021-03-23 RX ADMIN — Medication 10 ML: at 13:19

## 2021-03-23 RX ADMIN — GUAIFENESIN AND DEXTROMETHORPHAN 5 ML: 100; 10 SYRUP ORAL at 21:22

## 2021-03-23 RX ADMIN — Medication 220 MG: at 08:26

## 2021-03-23 RX ADMIN — FAMOTIDINE 20 MG: 20 TABLET, FILM COATED ORAL at 17:31

## 2021-03-23 RX ADMIN — CHOLECALCIFEROL TAB 125 MCG (5000 UNIT) 5000 UNITS: 125 TAB at 08:26

## 2021-03-23 RX ADMIN — TIOTROPIUM BROMIDE INHALATION SPRAY 2 PUFF: 3.12 SPRAY, METERED RESPIRATORY (INHALATION) at 08:02

## 2021-03-23 RX ADMIN — ROSUVASTATIN CALCIUM 20 MG: 20 TABLET, COATED ORAL at 21:22

## 2021-03-23 RX ADMIN — LEVOTHYROXINE SODIUM 50 MCG: 0.05 TABLET ORAL at 07:20

## 2021-03-23 RX ADMIN — OXYCODONE HYDROCHLORIDE AND ACETAMINOPHEN 1000 MG: 500 TABLET ORAL at 15:22

## 2021-03-23 RX ADMIN — CEFTRIAXONE SODIUM 1 G: 1 INJECTION, POWDER, FOR SOLUTION INTRAMUSCULAR; INTRAVENOUS at 10:01

## 2021-03-23 RX ADMIN — Medication 10 ML: at 21:23

## 2021-03-23 RX ADMIN — Medication 10 ML: at 05:46

## 2021-03-23 RX ADMIN — FAMOTIDINE 20 MG: 20 TABLET, FILM COATED ORAL at 08:27

## 2021-03-23 RX ADMIN — DEXAMETHASONE 6 MG: 4 TABLET ORAL at 08:27

## 2021-03-23 RX ADMIN — OXYCODONE HYDROCHLORIDE AND ACETAMINOPHEN 1000 MG: 500 TABLET ORAL at 21:22

## 2021-03-23 RX ADMIN — OXYCODONE HYDROCHLORIDE AND ACETAMINOPHEN 1000 MG: 500 TABLET ORAL at 08:26

## 2021-03-23 RX ADMIN — BUSPIRONE HYDROCHLORIDE 15 MG: 10 TABLET ORAL at 21:22

## 2021-03-23 RX ADMIN — SERTRALINE 50 MG: 50 TABLET, FILM COATED ORAL at 08:26

## 2021-03-23 RX ADMIN — BUDESONIDE AND FORMOTEROL FUMARATE DIHYDRATE 2 PUFF: 160; 4.5 AEROSOL RESPIRATORY (INHALATION) at 08:02

## 2021-03-23 RX ADMIN — BUDESONIDE AND FORMOTEROL FUMARATE DIHYDRATE 2 PUFF: 160; 4.5 AEROSOL RESPIRATORY (INHALATION) at 20:21

## 2021-03-23 RX ADMIN — LORAZEPAM 1 MG: 1 TABLET ORAL at 10:47

## 2021-03-23 RX ADMIN — ENOXAPARIN SODIUM 40 MG: 40 INJECTION SUBCUTANEOUS at 21:22

## 2021-03-23 RX ADMIN — ENOXAPARIN SODIUM 40 MG: 40 INJECTION SUBCUTANEOUS at 08:27

## 2021-03-23 NOTE — PROGRESS NOTES
03/22/21 2118   Oxygen Therapy   O2 Sat (%) 97 %   Pulse via Oximetry 65 beats per minute   O2 Device Heated; Hi flow nasal cannula   Skin Assessment Clean, dry, & intact   Skin Protection for O2 Device N/A   O2 Flow Rate (L/min) 50 l/min   O2 Temperature 87.8 °F (31 °C)   FIO2 (%) 75 %     Pt refused BIPAP for tonight and asked me to remove the machine from her room. Her breathing seems to be without any distress now.

## 2021-03-23 NOTE — PROGRESS NOTES
PT Daily Note:  Attempted to see patient for physical therapy this afternoon but patient refused to participate stating she is too tired. Encouraged patient to participate but she continued to refuse. Will check back on patient at a later date/time if schedule permits.   Thank you,  Mekhi Templeton, PTA

## 2021-03-23 NOTE — PROGRESS NOTES
Shift Assessment. Pt is A&O x 4. Pt cardiac rhythm is regular. Pt lungs are diminished. Pt is on airvo 50L at 75%. Bowel sounds present. Pt denies pain and nausea. Pt is resting in bed with the bed in the lowest position and the call light within reach.

## 2021-03-23 NOTE — PROGRESS NOTES
Problem: Breathing Pattern - Ineffective  Goal: *Absence of hypoxia  Outcome: Progressing Towards Goal  Goal: *Use of effective breathing techniques  Outcome: Progressing Towards Goal  Goal: *PALLIATIVE CARE:  Alleviation of Dyspnea  Outcome: Progressing Towards Goal     Problem: Patient Education: Go to Patient Education Activity  Goal: Patient/Family Education  Outcome: Progressing Towards Goal     Problem: Risk for Spread of Infection  Goal: Prevent transmission of infectious organism to others  Description: Prevent the transmission of infectious organisms to other patients, staff members, and visitors. Outcome: Progressing Towards Goal     Problem: Patient Education:  Go to Education Activity  Goal: Patient/Family Education  Outcome: Progressing Towards Goal     Problem: Patient Education: Go to Patient Education Activity  Goal: Patient/Family Education  Outcome: Progressing Towards Goal     Problem: Patient Education: Go to Patient Education Activity  Goal: Patient/Family Education  Outcome: Progressing Towards Goal     Problem: Gas Exchange - Impaired  Goal: Absence of hypoxia  Outcome: Progressing Towards Goal  Goal: Promote optimal lung function  Outcome: Progressing Towards Goal     Problem: Risk for Fluid Volume Deficit  Goal: Maintain normal heart rhythm  Outcome: Progressing Towards Goal  Goal: Maintain absence of muscle cramping  Outcome: Progressing Towards Goal  Goal: Maintain normal serum potassium, sodium, calcium, phosphorus, and pH  Outcome: Progressing Towards Goal     Problem: Loneliness or Risk for Loneliness  Goal: Demonstrate positive use of time alone when socialization is not possible  Outcome: Progressing Towards Goal     Problem: Fatigue  Goal: Verbalize increase energy and improved vitality  Outcome: Progressing Towards Goal     Problem: Falls - Risk of  Goal: *Absence of Falls  Description: Document Nikia Fall Risk and appropriate interventions in the flowsheet.   Outcome: Progressing Towards Goal  Note: Fall Risk Interventions:            Medication Interventions: Patient to call before getting OOB                   Problem: Patient Education: Go to Patient Education Activity  Goal: Patient/Family Education  Outcome: Progressing Towards Goal

## 2021-03-23 NOTE — PROGRESS NOTES
[facility] Hospitalist Progress Note     Name:  Cedrick Martinez  Age:60 y.o. Sex:female   :  1960    MRN:  [id number]     Admit Date:  3/17/2021    Reason for Admission:  COVID-19 [U07.1]    Assessment & Plan       Acute hypoxic respiratory failure due to COVID 19 pneumonia:  Weaning O2 as tolerant   Currently on FIO2 76%, 50 L   Continued vitamin C, vitamin D  Continued symbicort, spiriva and prn albuterol  On course of rocephin D4/7  Decadron D 5/10    HTN:  Holding lisinopril       Hypothyroidism:  Continued synthroid    Diet:  DIET REGULAR  DVT PPx: lovenox  GI Ppx: pepcid  Code: Full Code    Dispo/Discharge Planning: pending     Hospital Course/Subjective:     Ms. Kwame Odonnell is a 61 yo female admitted with COVID 19, acute hypoxic respiratory failure. She has required AIRVO. She has been on course of antibiotics, decadron, remdesivir and s/p convalescent plasma. She has received intermittent lasix diuretics. CT chest without contrast shows GG opacities. Discharge plans are pending. Subjective/24 hr Events (21):    Less short of breath, some cough, ate ok, had BM, getting out of bed at times     Objective:     Patient Vitals for the past 24 hrs:   Temp Pulse Resp BP SpO2   21 1040 98.2 °F (36.8 °C) 78 18 105/72 93 %   21 0751 98.2 °F (36.8 °C) 68 16 104/65 93 %   21 0723     92 %   21 0335 98.2 °F (36.8 °C) 78 18 119/70 97 %   21 0015 98.8 °F (37.1 °C) 82 18 116/73 96 %   21 2118     97 %   21 98.7 °F (37.1 °C) 77 16 109/67 96 %   21     97 %   21 1515     95 %   21 1448 98.4 °F (36.9 °C) 70 16 125/78 95 %     Oxygen Therapy  O2 Sat (%): 93 % (21 1040)  Pulse via Oximetry: 73 beats per minute (2123)  O2 Device: Heated; Hi flow nasal cannula (2145)  Skin Assessment: Clean, dry, & intact (21)  Skin Protection for O2 Device: N/A (21)  O2 Flow Rate (L/min): 50 l/min (03/23/21 0723)  O2 Temperature: 87.8 °F (31 °C) (03/23/21 0723)  FIO2 (%): 75 % (03/23/21 0723)    Body mass index is 34.13 kg/m². Physical Exam:   General: No acute distress, speaking in full sentences, no use of accessory muscles   Lungs: Coarse  Cardiovascular: Regular rate and rhythm with normal S1 and S2 , no edema   Abdomen: Soft, nontender, nondistended, normoactive bowel sounds   Extremities: No cyanosis clubbing or edema   Neuro: Nonfocal,  Psych: Normal affect     Data Review:  I have reviewed all labs, meds, and studies from the last 24 hours:    Labs:    Recent Results (from the past 24 hour(s))   TSH 3RD GENERATION    Collection Time: 03/23/21  4:02 AM   Result Value Ref Range    TSH 0.698 0.358 - 3.740 uIU/mL   CRP, HIGH SENSITIVITY    Collection Time: 03/23/21  4:02 AM   Result Value Ref Range    CRP, High sensitivity 7.8 mg/L   D DIMER    Collection Time: 03/23/21  4:02 AM   Result Value Ref Range    D DIMER 0.44 <0.56 ug/ml(FEU)   CBC WITH AUTOMATED DIFF    Collection Time: 03/23/21  4:02 AM   Result Value Ref Range    WBC 7.2 4.3 - 11.1 K/uL    RBC 4.19 4.05 - 5.2 M/uL    HGB 11.9 11.7 - 15.4 g/dL    HCT 37.2 35.8 - 46.3 %    MCV 88.8 79.6 - 97.8 FL    MCH 28.4 26.1 - 32.9 PG    MCHC 32.0 31.4 - 35.0 g/dL    RDW 11.9 11.9 - 14.6 %    PLATELET 266 347 - 967 K/uL    MPV 9.2 (L) 9.4 - 12.3 FL    ABSOLUTE NRBC 0.00 0.0 - 0.2 K/uL    DF AUTOMATED      NEUTROPHILS 77 43 - 78 %    LYMPHOCYTES 12 (L) 13 - 44 %    MONOCYTES 10 4.0 - 12.0 %    EOSINOPHILS 0 (L) 0.5 - 7.8 %    BASOPHILS 0 0.0 - 2.0 %    IMMATURE GRANULOCYTES 1 0.0 - 5.0 %    ABS. NEUTROPHILS 5.5 1.7 - 8.2 K/UL    ABS. LYMPHOCYTES 0.9 0.5 - 4.6 K/UL    ABS. MONOCYTES 0.7 0.1 - 1.3 K/UL    ABS. EOSINOPHILS 0.0 0.0 - 0.8 K/UL    ABS. BASOPHILS 0.0 0.0 - 0.2 K/UL    ABS. IMM.  GRANS. 0.1 0.0 - 0.5 K/UL   METABOLIC PANEL, BASIC    Collection Time: 03/23/21  4:02 AM   Result Value Ref Range    Sodium 138 136 - 145 mmol/L    Potassium 3.8 3.5 - 5.1 mmol/L    Chloride 105 98 - 107 mmol/L    CO2 27 21 - 32 mmol/L    Anion gap 6 (L) 7 - 16 mmol/L    Glucose 94 65 - 100 mg/dL    BUN 22 6 - 23 MG/DL    Creatinine 0.51 (L) 0.6 - 1.0 MG/DL    GFR est AA >60 >60 ml/min/1.73m2    GFR est non-AA >60 >60 ml/min/1.73m2    Calcium 9.1 8.3 - 10.4 MG/DL       All Micro Results     Procedure Component Value Units Date/Time    CULTURE, BLOOD [[id number]] Collected: 03/17/21 2244    Order Status: Completed Specimen: Blood Updated: 03/23/21 1107     Special Requests: --        RIGHT  Antecubital       Culture result: NO GROWTH 5 DAYS       SARS-COV-2, PCR [[id number]]  (Abnormal) Collected: 03/18/21 0110    Order Status: Completed Specimen: Nasopharyngeal Updated: 03/18/21 1048     Specimen source Nasopharyngeal        SARS-CoV-2 Detected        Comment:      The specimen is POSITIVE for SARS-CoV-2, the novel coronavirus associated with COVID-19. This test has been authorized by the FDA under an Emergency Use Authorization (EUA) for use by authorized laboratories. Fact sheet for Healthcare Providers: ConventionUpdate.co.nz  Fact sheet for Patients: https://fda.gov/media/689753/download       Methodology: RT-PCR  RESULTS VERIFIED, PHONED TO AND READ BACK BY  CAMMIE INIGUEZ ON 3/18/21 @1047, TA         CULTURE, BLOOD [[id number]] Collected: 03/17/21 2245    Order Status: Canceled Specimen: Blood           EKG Results     Procedure 720 Value Units Date/Time    EKG, 12 LEAD, INITIAL [[id number]] Collected: 03/17/21 2244    Order Status: Completed Updated: 03/18/21 0645     Ventricular Rate 82 BPM      Atrial Rate 82 BPM      P-R Interval 150 ms      QRS Duration 70 ms      Q-T Interval 346 ms      QTC Calculation (Bezet) 404 ms      Calculated P Axis 36 degrees      Calculated R Axis 0 degrees      Calculated T Axis 59 degrees      Diagnosis --     !! AGE AND GENDER SPECIFIC ECG ANALYSIS !!   Normal sinus rhythm  Normal ECG  No previous ECGs available  Confirmed by Sonal Ortega MD (), Kandy Barr ([id number]) on 3/18/2021 6:45:38 AM            Other Studies:  Ct Chest Wo Cont    Result Date: 3/18/2021  EXAMINATION: CT CHEST WO CONT HISTORY: History of Covid, rule out ARDS. TECHNIQUE: Axial images of the chest were obtained without the administration of intravenous contrast. Coronal reformatted images were submitted. All CT scans are performed using dose optimization technique as appropriate to a performed exam including automated exposure control and/or standardized protocols for targeted exams where dose is matched to indication/reason for exam/patient size. COMPARISON: CT heart dated 2/16/2021. FINDINGS: The visualized thyroid gland is unremarkable. There are no enlarged mediastinal, hilar, or axillary lymph nodes. The heart is not enlarged and there is no pericardial effusion. The esophagus appears normal. The airways are patent. There is no consolidation, pleural effusion, or pneumothorax. No pulmonary nodules. Bilateral groundglass infiltrates are seen, slightly greater on the right. These have developed since the previous examination. Visualized upper upper abdomen is normal. No aggressive osseous lesions. The soft tissues are normal.     Interval development of bilateral peripherally oriented, groundglass lung infiltrates. These findings are consistent with an atypical pneumonia including Covid. The appearance of the lung parenchyma at this time does not suggest ARDS. Xr Chest Port    Result Date: 3/17/2021  EXAM: XR CHEST PORT HISTORY: Covid +/ Hypoxia. TECHNIQUE: A single frontal view of the chest was submitted. COMPARISON: 3/13/2021 FINDINGS: The cardiac silhouette, mediastinum, and pulmonary vasculature are within normal limits. There is no consolidation, pleural effusion, or pneumothorax. Allowing for overlying structures, there may be subtle increased opacity in the periphery of the right lung. No significant osseous abnormalities are observed. Question interval development of mild infiltrate in the right lung peripherally. The overlying scapula and breast tissue are limiting evaluation of this area.           Current Meds:   Current Facility-Administered Medications   Medication Dose Route Frequency    loperamide (IMODIUM) capsule 4 mg  4 mg Oral Q6H PRN    sertraline (ZOLOFT) tablet 50 mg  50 mg Oral DAILY    cefTRIAXone (ROCEPHIN) 1 g in 0.9% sodium chloride (MBP/ADV) 50 mL MBP  1 g IntraVENous Q24H    zinc sulfate (ZINCATE) 50 mg zinc (220 mg) capsule 220 mg  220 mg Oral DAILY    ascorbic acid (vitamin C) (VITAMIN C) tablet 1,000 mg  1,000 mg Oral TID    LORazepam (ATIVAN) tablet 1 mg  1 mg Oral BID PRN    sodium chloride (OCEAN) 0.65 % nasal squeeze bottle 2 Spray  2 Spray Both Nostrils Q2H PRN    busPIRone (BUSPAR) tablet 15 mg  15 mg Oral BID PRN    budesonide-formoteroL (SYMBICORT) 160-4.5 mcg/actuation HFA inhaler 2 Puff  2 Puff Inhalation BID RT    tiotropium bromide (SPIRIVA RESPIMAT) 2.5 mcg /actuation  2 Puff Inhalation DAILY    dexAMETHasone (DECADRON) tablet 6 mg  6 mg Oral DAILY    levothyroxine (SYNTHROID) tablet 50 mcg  50 mcg Oral ACB    baclofen (LIORESAL) tablet 5 mg  5 mg Oral Q12H PRN    [Held by provider] lisinopriL (PRINIVIL, ZESTRIL) tablet 20 mg  20 mg Oral DAILY    rosuvastatin (CRESTOR) tablet 20 mg  20 mg Oral QHS    sodium chloride (NS) flush 5-40 mL  5-40 mL IntraVENous Q8H    sodium chloride (NS) flush 5-40 mL  5-40 mL IntraVENous PRN    acetaminophen (TYLENOL) tablet 650 mg  650 mg Oral Q6H PRN    Or    acetaminophen (TYLENOL) suppository 650 mg  650 mg Rectal Q6H PRN    polyethylene glycol (MIRALAX) packet 17 g  17 g Oral DAILY    senna (SENOKOT) tablet 8.6 mg  1 Tab Oral DAILY PRN    bisacodyL (DULCOLAX) suppository 10 mg  10 mg Rectal DAILY PRN    ondansetron (ZOFRAN) injection 4 mg  4 mg IntraVENous Q6H PRN    famotidine (PEPCID) tablet 20 mg  20 mg Oral BID    enoxaparin (LOVENOX) injection 40 mg  40 mg SubCUTAneous Q12H    guaiFENesin-dextromethorphan (ROBITUSSIN DM) 100-10 mg/5 mL syrup 5 mL  5 mL Oral Q4H PRN    cholecalciferol (VITAMIN D3) (5000 Units/125 mcg) tablet 5,000 Units  5,000 Units Oral DAILY    0.9% sodium chloride infusion 250 mL  250 mL IntraVENous PRN       Problem List:  Hospital Problems as of 3/23/2021 Date Reviewed: 3/18/2021          Codes Class Noted - Resolved POA    * (Principal) Acute respiratory failure due to COVID-19 [facility]) ICD-10-CM: U07.1, J96.00  ICD-9-CM: 518.81, 079.89  3/18/2021 - Present Yes        Essential hypertension, benign ICD-10-CM: I10  ICD-9-CM: 401.1  12/16/2013 - Present Yes        Hypothyroidism, acquired, autoimmune ICD-10-CM: E06.3  ICD-9-CM: 244.8  12/16/2013 - Present Yes        Hyperlipidemia with target low density lipoprotein (LDL) cholesterol less than 130 mg/dL ICD-10-CM: E78.5  ICD-9-CM: 272.4  12/16/2013 - Present Yes        Vitamin D deficiency ICD-10-CM: E55.9  ICD-9-CM: 268.9  12/16/2013 - Present Yes               Part of this note was written by using a voice dictation software and the note has been proof read but may still contain some grammatical/other typographical errors.     Signed By: Pito Lozada MD   [employer] [facility]    March 23, 2021  1:57 PM

## 2021-03-23 NOTE — PROGRESS NOTES
ACUTE OT GOALS:  (Developed with and agreed upon by patient and/or caregiver.)    1) Patient will complete lower body bathing and dressing with modified independence and adaptive equipment as needed. 2) Patient will complete toileting with modified independence. 3) Patient will complete functional transfers with modified independence and adaptive equipment as needed. 4) Patient will tolerate at least 20 minutes of OT activity with as needed rest breaks while maintaining O2 sats >90%. 5) Patient will verbalize at least 3 energy conservation technique to utilize during ADL/IADL. Timeframe: 7 visits     OCCUPATIONAL THERAPY: Daily Note OT Treatment Day # 2    Alex Zapien is a 62 y.o. female   PRIMARY DIAGNOSIS: Acute respiratory failure due to COVID-19 ([facility]  COVID-19 [U07.1]       Payor: BLUE CROSS / Plan: [address] / Product Type: PPO /   ASSESSMENT:     REHAB RECOMMENDATIONS: CURRENT LEVEL OF FUNCTION:  (Most Recently Demonstrated)   Recommendation to date pending progress:  [address] [place] [address]  Equipment:    To Be Determined Bathing:   Set Up  Dressing:   Minimal Assistance  Feeding/Grooming:   Minimal Assistance  Toileting:   Not tested  Functional Mobility:   Contact Guard Assistance     ASSESSMENT:  Ms. Fede Eden is unfortunately now on Airvo, but was able to maintain sats >90% entire session. She sat in chair for ADLs and required CGA for functional mobility today. She continues to be limited by fatigue and decreased endurance, but gives good effort. SUBJECTIVE:   Ms. Fede Eden states, \"I'm just so tired. \"    SOCIAL HISTORY/LIVING ENVIRONMENT:Lives alone, independent with all ADLs, IADLs, and driving. Works in [address]    Home Environment: Private residence  One/Two Story Residence: One story  Living Alone: No  Support Systems: Family member(s)    OBJECTIVE:     PAIN: VITAL SIGNS: LINES/DRAINS:   Pre Treatment: Pain Screen  Pain Scale 1: Numeric (0 - 10)  Pain Intensity 1: 0  Post Treatment: 0 O2 >90% entire session  IV  O2 Device: Heated, Hi flow nasal cannula     ACTIVITIES OF DAILY LIVING: I Mod I S SBA CGA Min Mod Max Total NT Comments   BASIC ADLs:              Bathing/ Showering [] [] [] [x] [] [] [] [] [] []    Toileting [] [] [] [] [] [] [] [] [] [x]    Dressing [] [] [] [] [] [x] [] [] [] [] Donning, doffing shirt    Feeding [] [] [x] [] [] [] [] [] [] []    Grooming [] [] [x] [] [] [] [x] [] [] [] S- brushing teeth  Mod- Combing hair, fixing hair  S- shampoo cap    Personal Device Care [] [] [] [] [] [] [] [] [] [x]    Functional Mobility [] [] [] [] [x] [] [] [] [] []    I=Independent, Mod I=Modified Independent, S=Supervision, SBA=Standby Assistance, CGA=Contact Guard Assistance,   Min=Minimal Assistance, Mod=Moderate Assistance, Max=Maximal Assistance, Total=Total Assistance, NT=Not Tested    MOBILITY: I Mod I S SBA CGA Min Mod Max Total  NT x2 Comments:   Supine to sit [] [] [] [x] [] [] [] [] [] [] []    Sit to supine [] [] [] [x] [] [] [] [] [] [] []    Sit to stand [] [] [] [] [x] [] [] [] [] [] []    Bed to chair [] [] [] [] [x] [] [] [] [] [] []    I=Independent, Mod I=Modified Independent, S=Supervision, SBA=Standby Assistance, CGA=Contact Guard Assistance,   Min=Minimal Assistance, Mod=Moderate Assistance, Max=Maximal Assistance, Total=Total Assistance, NT=Not Tested      PLAN:   FREQUENCY/DURATION: OT Plan of Care: 3 times/week for duration of hospital stay or until stated goals are met, whichever comes first.    TREATMENT:   TREATMENT:   ($$ Self Care/Home Management: 38-52 mins    )  Self Care (45 Minutes): Self care including Upper Body Bathing, Upper Body Dressing, Grooming and functional transfers from bed to chair and back to bed to increase independence and activity tolerance.         AFTER TREATMENT POSITION/PRECAUTIONS:  Bed, Needs within reach and RN notified    INTERDISCIPLINARY COLLABORATION:  RN/PCT and OT/MCCONNELL    TOTAL TREATMENT DURATION:  OT Patient Time In/Time Out  Time In: 1114  Time Out: [address], OTR/L

## 2021-03-24 LAB
ANION GAP SERPL CALC-SCNC: 5 MMOL/L (ref 7–16)
BASOPHILS # BLD: 0 K/UL (ref 0–0.2)
BASOPHILS NFR BLD: 0 % (ref 0–2)
BUN SERPL-MCNC: 18 MG/DL (ref 6–23)
CALCIUM SERPL-MCNC: 9.1 MG/DL (ref 8.3–10.4)
CHLORIDE SERPL-SCNC: 106 MMOL/L (ref 98–107)
CO2 SERPL-SCNC: 28 MMOL/L (ref 21–32)
CREAT SERPL-MCNC: 0.49 MG/DL (ref 0.6–1)
CRP SERPL HS-MCNC: 8.1 MG/L
D DIMER PPP FEU-MCNC: 0.47 UG/ML(FEU)
DIFFERENTIAL METHOD BLD: ABNORMAL
EOSINOPHIL # BLD: 0 K/UL (ref 0–0.8)
EOSINOPHIL NFR BLD: 0 % (ref 0.5–7.8)
ERYTHROCYTE [DISTWIDTH] IN BLOOD BY AUTOMATED COUNT: 11.9 % (ref 11.9–14.6)
GLUCOSE SERPL-MCNC: 109 MG/DL (ref 65–100)
HCT VFR BLD AUTO: 38.9 % (ref 35.8–46.3)
HGB BLD-MCNC: 12.6 G/DL (ref 11.7–15.4)
IMM GRANULOCYTES # BLD AUTO: 0.1 K/UL (ref 0–0.5)
IMM GRANULOCYTES NFR BLD AUTO: 1 % (ref 0–5)
LYMPHOCYTES # BLD: 0.8 K/UL (ref 0.5–4.6)
LYMPHOCYTES NFR BLD: 9 % (ref 13–44)
MCH RBC QN AUTO: 29 PG (ref 26.1–32.9)
MCHC RBC AUTO-ENTMCNC: 32.4 G/DL (ref 31.4–35)
MCV RBC AUTO: 89.6 FL (ref 79.6–97.8)
MONOCYTES # BLD: 0.8 K/UL (ref 0.1–1.3)
MONOCYTES NFR BLD: 10 % (ref 4–12)
NEUTS SEG # BLD: 7 K/UL (ref 1.7–8.2)
NEUTS SEG NFR BLD: 80 % (ref 43–78)
NRBC # BLD: 0 K/UL (ref 0–0.2)
PLATELET # BLD AUTO: 323 K/UL (ref 150–450)
PMV BLD AUTO: 9.3 FL (ref 9.4–12.3)
POTASSIUM SERPL-SCNC: 3.8 MMOL/L (ref 3.5–5.1)
RBC # BLD AUTO: 4.34 M/UL (ref 4.05–5.2)
SODIUM SERPL-SCNC: 139 MMOL/L (ref 136–145)
WBC # BLD AUTO: 8.7 K/UL (ref 4.3–11.1)

## 2021-03-24 PROCEDURE — 74011250636 HC RX REV CODE- 250/636: Performed by: HOSPITALIST

## 2021-03-24 PROCEDURE — 65270000029 HC RM PRIVATE

## 2021-03-24 PROCEDURE — 74011250637 HC RX REV CODE- 250/637: Performed by: INTERNAL MEDICINE

## 2021-03-24 PROCEDURE — 85379 FIBRIN DEGRADATION QUANT: CPT

## 2021-03-24 PROCEDURE — 74011000258 HC RX REV CODE- 258: Performed by: INTERNAL MEDICINE

## 2021-03-24 PROCEDURE — 97530 THERAPEUTIC ACTIVITIES: CPT

## 2021-03-24 PROCEDURE — 85025 COMPLETE CBC W/AUTO DIFF WBC: CPT

## 2021-03-24 PROCEDURE — 94762 N-INVAS EAR/PLS OXIMTRY CONT: CPT

## 2021-03-24 PROCEDURE — 94640 AIRWAY INHALATION TREATMENT: CPT

## 2021-03-24 PROCEDURE — 86141 C-REACTIVE PROTEIN HS: CPT

## 2021-03-24 PROCEDURE — 80048 BASIC METABOLIC PNL TOTAL CA: CPT

## 2021-03-24 PROCEDURE — 74011250637 HC RX REV CODE- 250/637: Performed by: HOSPITALIST

## 2021-03-24 PROCEDURE — 77010033711 HC HIGH FLOW OXYGEN

## 2021-03-24 PROCEDURE — 36415 COLL VENOUS BLD VENIPUNCTURE: CPT

## 2021-03-24 PROCEDURE — 2709999900 HC NON-CHARGEABLE SUPPLY

## 2021-03-24 PROCEDURE — 74011250636 HC RX REV CODE- 250/636: Performed by: INTERNAL MEDICINE

## 2021-03-24 RX ADMIN — DEXAMETHASONE 6 MG: 4 TABLET ORAL at 08:05

## 2021-03-24 RX ADMIN — BUSPIRONE HYDROCHLORIDE 15 MG: 10 TABLET ORAL at 21:37

## 2021-03-24 RX ADMIN — OXYCODONE HYDROCHLORIDE AND ACETAMINOPHEN 1000 MG: 500 TABLET ORAL at 17:07

## 2021-03-24 RX ADMIN — Medication 10 ML: at 21:37

## 2021-03-24 RX ADMIN — SERTRALINE 50 MG: 50 TABLET, FILM COATED ORAL at 08:04

## 2021-03-24 RX ADMIN — ENOXAPARIN SODIUM 40 MG: 40 INJECTION SUBCUTANEOUS at 21:37

## 2021-03-24 RX ADMIN — GUAIFENESIN AND DEXTROMETHORPHAN 5 ML: 100; 10 SYRUP ORAL at 11:55

## 2021-03-24 RX ADMIN — ENOXAPARIN SODIUM 40 MG: 40 INJECTION SUBCUTANEOUS at 08:06

## 2021-03-24 RX ADMIN — CEFTRIAXONE SODIUM 1 G: 1 INJECTION, POWDER, FOR SOLUTION INTRAMUSCULAR; INTRAVENOUS at 11:34

## 2021-03-24 RX ADMIN — ROSUVASTATIN CALCIUM 20 MG: 20 TABLET, COATED ORAL at 21:37

## 2021-03-24 RX ADMIN — CHOLECALCIFEROL TAB 125 MCG (5000 UNIT) 5000 UNITS: 125 TAB at 08:07

## 2021-03-24 RX ADMIN — Medication 220 MG: at 08:06

## 2021-03-24 RX ADMIN — Medication 10 ML: at 05:58

## 2021-03-24 RX ADMIN — POLYETHYLENE GLYCOL 3350 17 G: 17 POWDER, FOR SOLUTION ORAL at 08:06

## 2021-03-24 RX ADMIN — LORAZEPAM 1 MG: 1 TABLET ORAL at 11:55

## 2021-03-24 RX ADMIN — BUDESONIDE AND FORMOTEROL FUMARATE DIHYDRATE 2 PUFF: 160; 4.5 AEROSOL RESPIRATORY (INHALATION) at 20:00

## 2021-03-24 RX ADMIN — FAMOTIDINE 20 MG: 20 TABLET, FILM COATED ORAL at 08:06

## 2021-03-24 RX ADMIN — FAMOTIDINE 20 MG: 20 TABLET, FILM COATED ORAL at 17:07

## 2021-03-24 RX ADMIN — TIOTROPIUM BROMIDE INHALATION SPRAY 2 PUFF: 3.12 SPRAY, METERED RESPIRATORY (INHALATION) at 08:31

## 2021-03-24 RX ADMIN — Medication 10 ML: at 13:31

## 2021-03-24 RX ADMIN — OXYCODONE HYDROCHLORIDE AND ACETAMINOPHEN 1000 MG: 500 TABLET ORAL at 08:04

## 2021-03-24 RX ADMIN — LEVOTHYROXINE SODIUM 50 MCG: 0.05 TABLET ORAL at 07:52

## 2021-03-24 RX ADMIN — OXYCODONE HYDROCHLORIDE AND ACETAMINOPHEN 1000 MG: 500 TABLET ORAL at 21:37

## 2021-03-24 RX ADMIN — BUDESONIDE AND FORMOTEROL FUMARATE DIHYDRATE 2 PUFF: 160; 4.5 AEROSOL RESPIRATORY (INHALATION) at 08:31

## 2021-03-24 NOTE — PROGRESS NOTES
ACUTE PHYSICAL THERAPY GOALS:  (Developed with and agreed upon by patient and/or caregiver. )  LTG:  (1.)Ms. Katie Neal will move from supine to sit and sit to supine, scoot up and down and roll side to side in flat bed with INDEPENDENT within 7 day(s). (2.)Ms. Katie Neal will ambulate with modified INDEPENDENCE for 75+ feet with the least restrictive/no device within 7 day(s). (3.)Ms. Katie Neal will perform standing static and dynamic balance activities x 8 minutes with SUPERVISION to improve safety within 7 day(s). PHYSICAL THERAPY: Daily Note Treatment Day # 3    Darrian Camacho is a 62 y.o. female   PRIMARY DIAGNOSIS: Acute respiratory [facility]  COVID-19 [U07.1]         ASSESSMENT:     REHAB RECOMMENDATIONS: CURRENT LEVEL OF FUNCTION:  (Most Recently Demonstrated)   Recommendation to date pending progress:  [address] [place] [address]  Equipment:    To Be Determined Bed Mobility:   Supervision  Sit to Stand:   Supervision  Transfers:   Unable to perform  Gait/Mobility:   Unable to perform     ASSESSMENT:  Ms. Katie Neal supine in bed on Airvo. Stood up to exercise and reported feeling lightheaded/dizzy. Took BP in sitting 's and standing BP 85/75. .  Gait deferred due to symptomatic orthostatic hypotension. Notified RN. No progress today. . Will continue efforts. SUBJECTIVE:   Ms. Katie Neal states, \"I'm sorry. \"    SOCIAL HISTORY/ LIVING ENVIRONMENT: At baseline she lives alone and is independent with all ADLs, IADLs, mobility, and working.    Home Environment: Private residence  One/Two Story Residence: One story  Living Alone: No  Support Systems: Family member(s)  OBJECTIVE:     PAIN: VITAL SIGNS: LINES/DRAINS:   Pre Treatment: Pain Screen  Pain Scale 1: Numeric (0 - 10)  Pain Intensity 1: 0  Post Treatment: 0   Continuous pulse ox  O2 Device: Heated, Hi flow nasal cannula     MOBILITY: I Mod I S SBA CGA Min Mod Max Total  NT x2 Comments:   Bed Mobility    Rolling [] [] [] [] [] [] [] [] [] [] []    Supine to Sit [] [] [x] [] [] [] [] [] [] [] []    Scooting [] [] [x] [] [] [] [] [] [] [] []    Sit to Supine [] [] [x] [] [] [] [] [] [] [] []    Transfers    Sit to Stand [] [] [x] [] [] [] [] [] [] [] []    Bed to Chair [] [] [] [] [] [] [] [] [] [x] []    Stand to Sit [] [] [x] [] [] [] [] [] [] [] []    I=Independent, Mod I=Modified Independent, S=Supervision, SBA=Standby Assistance, CGA=Contact Guard Assistance,   Min=Minimal Assistance, Mod=Moderate Assistance, Max=Maximal Assistance, Total=Total Assistance, NT=Not Tested    BALANCE: Good Fair+ Fair Fair- Poor NT Comments   Sitting Static [x] [] [] [] [] []    Sitting Dynamic [x] [] [] [] [] []              Standing Static [] [x] [] [] [] []    Standing Dynamic [] [x] [] [] [] []      GAIT: I Mod I S SBA CGA Min Mod Max Total  NT x2 Comments:   Level of Assistance [] [] [] [x] [] [] [] [] [] [x] []    Distance 0'    DME None    Gait Quality     Weightbearing  Status N/A     I=Independent, Mod I=Modified Independent, S=Supervision, SBA=Standby Assistance, CGA=Contact Guard Assistance,   Min=Minimal Assistance, Mod=Moderate Assistance, Max=Maximal Assistance, Total=Total Assistance, NT=Not Tested    PLAN:   FREQUENCY/DURATION: PT Plan of Care: 3 times/week for duration of hospital stay or until stated goals are met, whichever comes first.  TREATMENT:     TREATMENT:   ($$ Therapeutic Activity: 23-37 mins    )  Therapeutic Activity (23 Minutes): Therapeutic activity included Supine to Sit, Sit to Supine, Scooting, Transfer Training, Standing balance and exercises to improve functional Mobility, Strength and Activity tolerance.     TREATMENT GRID:  N/A  DATE: 3/19/21  3/24/21         Ambulation             Hip Flexion x10 AB           Long Arc Quads x10 AB           Hip ab/ad             Heel Raises    x20 AB standing         Toe Raises              bridging    x20          SLR   2x10 AB                           AFTER TREATMENT POSITION/PRECAUTIONS:  Bed, Needs within reach and RN notified    INTERDISCIPLINARY COLLABORATION:  RN/PCT and PT/PTA    TOTAL TREATMENT DURATION:  PT Patient Time In/Time Out  Time In: 0962  Time Out: [address], PT, DPT

## 2021-03-24 NOTE — PROGRESS NOTES
Chart screened by CM for d/c planning. Pt is currently requiring 40L O2 heated HFNC Airvo 60%. Weaning O2 as tolerated. PT and OT are recommending [facility] upon d/c. No immediate needs identified. CM will continue to follow and remain available if any needs arise.

## 2021-03-24 NOTE — PROGRESS NOTES
Sat was reading between 88-90%. Changed pulse ox probe to a different finger and sat is reading 96% now.  Weaned Airvo to 40L and 60% (previous settings were 50L 75%)

## 2021-03-25 PROCEDURE — 74011250636 HC RX REV CODE- 250/636: Performed by: INTERNAL MEDICINE

## 2021-03-25 PROCEDURE — 77010033711 HC HIGH FLOW OXYGEN

## 2021-03-25 PROCEDURE — 77010033678 HC OXYGEN DAILY

## 2021-03-25 PROCEDURE — 74011250637 HC RX REV CODE- 250/637: Performed by: INTERNAL MEDICINE

## 2021-03-25 PROCEDURE — 74011250636 HC RX REV CODE- 250/636: Performed by: HOSPITALIST

## 2021-03-25 PROCEDURE — 74011000258 HC RX REV CODE- 258: Performed by: INTERNAL MEDICINE

## 2021-03-25 PROCEDURE — 94640 AIRWAY INHALATION TREATMENT: CPT

## 2021-03-25 PROCEDURE — 65270000029 HC RM PRIVATE

## 2021-03-25 PROCEDURE — 74011250637 HC RX REV CODE- 250/637: Performed by: HOSPITALIST

## 2021-03-25 PROCEDURE — 94762 N-INVAS EAR/PLS OXIMTRY CONT: CPT

## 2021-03-25 RX ADMIN — Medication 10 ML: at 13:47

## 2021-03-25 RX ADMIN — ROSUVASTATIN CALCIUM 20 MG: 20 TABLET, COATED ORAL at 21:32

## 2021-03-25 RX ADMIN — BUDESONIDE AND FORMOTEROL FUMARATE DIHYDRATE 2 PUFF: 160; 4.5 AEROSOL RESPIRATORY (INHALATION) at 07:30

## 2021-03-25 RX ADMIN — BUDESONIDE AND FORMOTEROL FUMARATE DIHYDRATE 2 PUFF: 160; 4.5 AEROSOL RESPIRATORY (INHALATION) at 20:00

## 2021-03-25 RX ADMIN — LORAZEPAM 1 MG: 1 TABLET ORAL at 12:09

## 2021-03-25 RX ADMIN — POLYETHYLENE GLYCOL 3350 17 G: 17 POWDER, FOR SOLUTION ORAL at 08:02

## 2021-03-25 RX ADMIN — ENOXAPARIN SODIUM 40 MG: 40 INJECTION SUBCUTANEOUS at 08:04

## 2021-03-25 RX ADMIN — SERTRALINE 50 MG: 50 TABLET, FILM COATED ORAL at 08:03

## 2021-03-25 RX ADMIN — ENOXAPARIN SODIUM 40 MG: 40 INJECTION SUBCUTANEOUS at 21:32

## 2021-03-25 RX ADMIN — FAMOTIDINE 20 MG: 20 TABLET, FILM COATED ORAL at 08:03

## 2021-03-25 RX ADMIN — DEXAMETHASONE 6 MG: 4 TABLET ORAL at 08:03

## 2021-03-25 RX ADMIN — LORAZEPAM 1 MG: 1 TABLET ORAL at 21:32

## 2021-03-25 RX ADMIN — LEVOTHYROXINE SODIUM 50 MCG: 0.05 TABLET ORAL at 07:53

## 2021-03-25 RX ADMIN — FAMOTIDINE 20 MG: 20 TABLET, FILM COATED ORAL at 17:22

## 2021-03-25 RX ADMIN — CHOLECALCIFEROL TAB 125 MCG (5000 UNIT) 5000 UNITS: 125 TAB at 08:03

## 2021-03-25 RX ADMIN — TIOTROPIUM BROMIDE INHALATION SPRAY 2 PUFF: 3.12 SPRAY, METERED RESPIRATORY (INHALATION) at 09:00

## 2021-03-25 RX ADMIN — Medication 10 ML: at 21:33

## 2021-03-25 RX ADMIN — CEFTRIAXONE SODIUM 1 G: 1 INJECTION, POWDER, FOR SOLUTION INTRAMUSCULAR; INTRAVENOUS at 11:26

## 2021-03-25 RX ADMIN — Medication 10 ML: at 05:04

## 2021-03-25 NOTE — PROGRESS NOTES
Problem: Breathing Pattern - Ineffective  Goal: *Absence of hypoxia  Outcome: Progressing Towards Goal  Goal: *Use of effective breathing techniques  Outcome: Progressing Towards Goal     Problem: Patient Education: Go to Patient Education Activity  Goal: Patient/Family Education  Outcome: Progressing Towards Goal     Problem: Risk for Spread of Infection  Goal: Prevent transmission of infectious organism to others  Description: Prevent the transmission of infectious organisms to other patients, staff members, and visitors. Outcome: Progressing Towards Goal     Problem: Patient Education:  Go to Education Activity  Goal: Patient/Family Education  Outcome: Progressing Towards Goal     Problem: Patient Education: Go to Patient Education Activity  Goal: Patient/Family Education  Outcome: Progressing Towards Goal     Problem: Patient Education: Go to Patient Education Activity  Goal: Patient/Family Education  Outcome: Progressing Towards Goal     Problem: Gas Exchange - Impaired  Goal: Absence of hypoxia  Outcome: Progressing Towards Goal  Goal: Promote optimal lung function  Outcome: Progressing Towards Goal     Problem: Risk for Fluid Volume Deficit  Goal: Maintain normal heart rhythm  Outcome: Progressing Towards Goal  Goal: Maintain absence of muscle cramping  Outcome: Progressing Towards Goal  Goal: Maintain normal serum potassium, sodium, calcium, phosphorus, and pH  Outcome: Progressing Towards Goal     Problem: Loneliness or Risk for Loneliness  Goal: Demonstrate positive use of time alone when socialization is not possible  Outcome: Progressing Towards Goal     Problem: Fatigue  Goal: Verbalize increase energy and improved vitality  Outcome: Progressing Towards Goal     Problem: Falls - Risk of  Goal: *Absence of Falls  Description: Document Nikia Fall Risk and appropriate interventions in the flowsheet.   Outcome: Progressing Towards Goal  Note: Fall Risk Interventions:            Medication Interventions: Patient to call before getting OOB, Teach patient to arise slowly                   Problem: Patient Education: Go to Patient Education Activity  Goal: Patient/Family Education  Outcome: Progressing Towards Goal

## 2021-03-25 NOTE — PROGRESS NOTES
Pt having anxiety. Pt requested something to help calm her down before going to bed. Gave pt Buspar 15 mg per MD orders.

## 2021-03-25 NOTE — PROGRESS NOTES
[facility] Hospitalist Progress Note     Name:  Katie Riley  Age:60 y.o. Sex:female   :  1960    MRN:  [id number]     Admit Date:  3/17/2021    Reason for Admission:  COVID-19 [U07.1]    Assessment & Plan       Acute hypoxic respiratory failure due to COVID 19 pneumonia:  Weaning O2 as tolerated  Currently on AirVo 60%, 40L   Continued vitamin C, vitamin D  Continued symbicort, spiriva and prn albuterol  On course of rocephin through 3/26  Decadron through 3/28    Facial swelling  Unclear cause. - will monitor    HTN:  Holding lisinopril     Hypothyroidism:  Continued synthroid    Diet:  DIET REGULAR  DVT PPx: lovenox  GI Ppx: pepcid  Code: Full Code    Dispo/Discharge Planning: likely home in 2-3 days    Hospital Course/Subjective:     Ms. Lu Jerez is a 63 yo female admitted with COVID 19, acute hypoxic respiratory failure. She has required AIRVO. She has been on course of antibiotics, decadron, remdesivir and s/p convalescent plasma. She has received intermittent lasix diuretics. CT chest without contrast shows GG opacities. Subjective/24 hr Events (21):    Pt reports feeling better today. SOB improving. Nsg notes mild facial swelling, pt agrees it does seem a little puffy. No other edema. Objective:     Patient Vitals for the past 24 hrs:   Temp Pulse Resp BP SpO2   21 0736     96 %   21 0725 98.5 °F (36.9 °C) 60 22 106/65 95 %   21 0302 98.2 °F (36.8 °C) 60 18 134/72 95 %   21 2307 98.2 °F (36.8 °C) 61 18 121/76 96 %   21 1917 98.6 °F (37 °C) 73 20 107/65 95 %   21 1538 97.7 °F (36.5 °C) 69 18 102/60 98 %   21 1058 98.1 °F (36.7 °C) 79 17 112/70 94 %     Oxygen Therapy  O2 Sat (%): 96 % (21 0736)  Pulse via Oximetry: 72 beats per minute (21)  O2 Device: Heated; Hi flow nasal cannula (21)  Skin Assessment: Clean, dry, & intact (21 0139)  Skin Protection for O2 Device: N/A (2127)  O2 Flow Rate (L/min): 40 l/min (03/25/21 0736)  O2 Temperature: 87.8 °F (31 °C) (03/24/21 0831)  FIO2 (%): 60 % (03/25/21 0736)    Body mass index is 34.13 kg/m². Physical Exam:   General: No acute distress, speaking in full sentences, no use of accessory muscles   HEENT: mild facial swelling, no stridor  Lungs: Coarse  Cardiovascular: Regular rate and rhythm with normal S1 and S2 , no edema   Abdomen: Soft, nontender, nondistended, normoactive bowel sounds   Extremities: No cyanosis clubbing or edema   Neuro: Nonfocal,  Psych: Normal affect     Data Review:  I have reviewed all labs, meds, and studies from the last 24 hours:    Labs:    No results found for this or any previous visit (from the past 24 hour(s)). All Micro Results     Procedure Component Value Units Date/Time    CULTURE, BLOOD [[id number]] Collected: 03/17/21 2244    Order Status: Completed Specimen: Blood Updated: 03/23/21 1107     Special Requests: --        RIGHT  Antecubital       Culture result: NO GROWTH 5 DAYS       SARS-COV-2, PCR [[id number]]  (Abnormal) Collected: 03/18/21 0110    Order Status: Completed Specimen: Nasopharyngeal Updated: 03/18/21 1048     Specimen source Nasopharyngeal        SARS-CoV-2 Detected        Comment:      The specimen is POSITIVE for SARS-CoV-2, the novel coronavirus associated with COVID-19. This test has been authorized by the FDA under an Emergency Use Authorization (EUA) for use by authorized laboratories.         Fact sheet for Healthcare Providers: ConventionUpdate.co.nz  Fact sheet for Patients: https://fda.gov/media/688603/download       Methodology: RT-PCR  RESULTS VERIFIED, PHONED TO AND READ BACK BY  CAMMIE INIGUEZ ON 3/18/21 @1047, TA         CULTURE, BLOOD [[id number]] Collected: 03/17/21 2245    Order Status: Canceled Specimen: Blood           EKG Results     Procedure 720 Value Units Date/Time    EKG, 12 LEAD, INITIAL [[id number]] Collected: 03/17/21 2244    Order Status: Completed Updated: 03/18/21 0645     Ventricular Rate 82 BPM      Atrial Rate 82 BPM      P-R Interval 150 ms      QRS Duration 70 ms      Q-T Interval 346 ms      QTC Calculation (Bezet) 404 ms      Calculated P Axis 36 degrees      Calculated R Axis 0 degrees      Calculated T Axis 59 degrees      Diagnosis --     !! AGE AND GENDER SPECIFIC ECG ANALYSIS !! Normal sinus rhythm  Normal ECG  No previous ECGs available  Confirmed by [facility]  MD (), Ramya Munguia ([id number]) on 3/18/2021 6:45:38 AM            Other Studies:  Ct Chest Wo Cont    Result Date: 3/18/2021  EXAMINATION: CT CHEST WO CONT HISTORY: History of Covid, rule out ARDS. TECHNIQUE: Axial images of the chest were obtained without the administration of intravenous contrast. Coronal reformatted images were submitted. All CT scans are performed using dose optimization technique as appropriate to a performed exam including automated exposure control and/or standardized protocols for targeted exams where dose is matched to indication/reason for exam/patient size. COMPARISON: CT heart dated 2/16/2021. FINDINGS: The visualized thyroid gland is unremarkable. There are no enlarged mediastinal, hilar, or axillary lymph nodes. The heart is not enlarged and there is no pericardial effusion. The esophagus appears normal. The airways are patent. There is no consolidation, pleural effusion, or pneumothorax. No pulmonary nodules. Bilateral groundglass infiltrates are seen, slightly greater on the right. These have developed since the previous examination. Visualized upper upper abdomen is normal. No aggressive osseous lesions. The soft tissues are normal.     Interval development of bilateral peripherally oriented, groundglass lung infiltrates. These findings are consistent with an atypical pneumonia including Covid. The appearance of the lung parenchyma at this time does not suggest ARDS. Xr Chest Port    Result Date: 3/17/2021  EXAM: XR CHEST PORT HISTORY: Covid +/ Hypoxia. TECHNIQUE: A single frontal view of the chest was submitted. COMPARISON: 3/13/2021 FINDINGS: The cardiac silhouette, mediastinum, and pulmonary vasculature are within normal limits. There is no consolidation, pleural effusion, or pneumothorax. Allowing for overlying structures, there may be subtle increased opacity in the periphery of the right lung. No significant osseous abnormalities are observed. Question interval development of mild infiltrate in the right lung peripherally. The overlying scapula and breast tissue are limiting evaluation of this area.           Current Meds:   Current Facility-Administered Medications   Medication Dose Route Frequency    albuterol (PROVENTIL VENTOLIN) nebulizer solution 2.5 mg  2.5 mg Nebulization Q4H PRN    loperamide (IMODIUM) capsule 4 mg  4 mg Oral Q6H PRN    sertraline (ZOLOFT) tablet 50 mg  50 mg Oral DAILY    cefTRIAXone (ROCEPHIN) 1 g in 0.9% sodium chloride (MBP/ADV) 50 mL MBP  1 g IntraVENous Q24H    LORazepam (ATIVAN) tablet 1 mg  1 mg Oral BID PRN    sodium chloride (OCEAN) 0.65 % nasal squeeze bottle 2 Spray  2 Spray Both Nostrils Q2H PRN    busPIRone (BUSPAR) tablet 15 mg  15 mg Oral BID PRN    budesonide-formoteroL (SYMBICORT) 160-4.5 mcg/actuation HFA inhaler 2 Puff  2 Puff Inhalation BID RT    tiotropium bromide (SPIRIVA RESPIMAT) 2.5 mcg /actuation  2 Puff Inhalation DAILY    dexAMETHasone (DECADRON) tablet 6 mg  6 mg Oral DAILY    levothyroxine (SYNTHROID) tablet 50 mcg  50 mcg Oral ACB    baclofen (LIORESAL) tablet 5 mg  5 mg Oral Q12H PRN    rosuvastatin (CRESTOR) tablet 20 mg  20 mg Oral QHS    sodium chloride (NS) flush 5-40 mL  5-40 mL IntraVENous Q8H    sodium chloride (NS) flush 5-40 mL  5-40 mL IntraVENous PRN    acetaminophen (TYLENOL) tablet 650 mg  650 mg Oral Q6H PRN    Or    acetaminophen (TYLENOL) suppository 650 mg  650 mg Rectal Q6H PRN    polyethylene glycol (MIRALAX) packet 17 g  17 g Oral DAILY    senna (SENOKOT) tablet 8.6 mg  1 Tab Oral DAILY PRN    bisacodyL (DULCOLAX) suppository 10 mg  10 mg Rectal DAILY PRN    ondansetron (ZOFRAN) injection 4 mg  4 mg IntraVENous Q6H PRN    famotidine (PEPCID) tablet 20 mg  20 mg Oral BID    enoxaparin (LOVENOX) injection 40 mg  40 mg SubCUTAneous Q12H    guaiFENesin-dextromethorphan (ROBITUSSIN DM) 100-10 mg/5 mL syrup 5 mL  5 mL Oral Q4H PRN    cholecalciferol (VITAMIN D3) (5000 Units/125 mcg) tablet 5,000 Units  5,000 Units Oral DAILY    0.9% sodium chloride infusion 250 mL  250 mL IntraVENous PRN       Problem List:  Hospital Problems as of 3/25/2021 Date Reviewed: 3/18/2021          Codes Class Noted - Resolved POA    * (Principal) Acute respiratory failure due to COVID-19 [facility]) ICD-10-CM: U07.1, J96.00  ICD-9-CM: 518.81, 079.89  3/18/2021 - Present Yes        Essential hypertension, benign ICD-10-CM: I10  ICD-9-CM: 401.1  12/16/2013 - Present Yes        Hypothyroidism, acquired, autoimmune ICD-10-CM: E06.3  ICD-9-CM: 244.8  12/16/2013 - Present Yes        Hyperlipidemia with target low density lipoprotein (LDL) cholesterol less than 130 mg/dL ICD-10-CM: E78.5  ICD-9-CM: 272.4  12/16/2013 - Present Yes        Vitamin D deficiency ICD-10-CM: E55.9  ICD-9-CM: 268.9  12/16/2013 - Present Yes               Part of this note was written by using a voice dictation software and the note has been proof read but may still contain some grammatical/other typographical errors.     Signed By: Sara Denver, MD   [employer] [facility]    March 25, 2021  1:57 PM

## 2021-03-25 NOTE — PROGRESS NOTES
0845: Patient resting in bed at this time, no acute issues noted. Head to toe assessment completed at this time. Patient has puffy face noted. Lungs sound are diminished, there is no edema noted to BLE or BUE. Will perfect serve the doctor. Will monitor for changes.

## 2021-03-25 NOTE — PROGRESS NOTES
[facility]ist Progress Note     Name:  Diane Kingston  Age:60 y.o. Sex:female   :  1960    MRN:  [id number]     Admit Date:  3/17/2021    Reason for Admission:  COVID-19 [U07.1]    Assessment & Plan       Acute hypoxic respiratory failure due to COVID 19 pneumonia:  Weaning O2 as tolerant   Currently on 40L  L   Continued vitamin C, vitamin D  Continued symbicort, spiriva and prn albuterol  On course of rocephin D5/7  Decadron D 6/10    HTN:  Holding lisinopril       Hypothyroidism:  Continued synthroid    Diet:  DIET REGULAR  DVT PPx: lovenox  GI Ppx: pepcid  Code: Full Code    Dispo/Discharge Planning: pending     Hospital Course/Subjective:     Ms. Amadou Fritz is a 61 yo female admitted with COVID 19, acute hypoxic respiratory failure. She has required AIRVO. She has been on course of antibiotics, decadron, remdesivir and s/p convalescent plasma. She has received intermittent lasix diuretics. CT chest without contrast shows GG opacities. Discharge plans are pending. Subjective/24 hr Events (21): On 40L, eating less short of breath, no edema, has BM     Objective:     Patient Vitals for the past 24 hrs:   Temp Pulse Resp BP SpO2   21 0736     96 %   21 0725 98.5 °F (36.9 °C) 60 22 106/65 95 %   21 0302 98.2 °F (36.8 °C) 60 18 134/72 95 %   21 2307 98.2 °F (36.8 °C) 61 18 121/76 96 %   21 1917 98.6 °F (37 °C) 73 20 107/65 95 %   21 1538 97.7 °F (36.5 °C) 69 18 102/60 98 %   21 1058 98.1 °F (36.7 °C) 79 17 112/70 94 %     Oxygen Therapy  O2 Sat (%): 96 % (21 0736)  Pulse via Oximetry: 72 beats per minute (21 0736)  O2 Device: Heated; Hi flow nasal cannula (21 0736)  Skin Assessment: Clean, dry, & intact (21)  Skin Protection for O2 Device: N/A (21)  O2 Flow Rate (L/min): 40 l/min (21)  O2 Temperature: 87.8 °F (31 °C) (21)  FIO2 (%): 60 % (21)    Body mass index is 34.13 kg/m². Physical Exam:   General: No acute distress, speaking in full sentences, no use of accessory muscles   Lungs: Coarse  Cardiovascular: Regular rate and rhythm with normal S1 and S2 , no edema   Abdomen: Soft, nontender, nondistended, normoactive bowel sounds   Extremities: No cyanosis clubbing or edema   Neuro: Nonfocal,  Psych: Normal affect     Data Review:  I have reviewed all labs, meds, and studies from the last 24 hours:    Labs:    No results found for this or any previous visit (from the past 24 hour(s)). All Micro Results     Procedure Component Value Units Date/Time    CULTURE, BLOOD [[id number]] Collected: 03/17/21 2244    Order Status: Completed Specimen: Blood Updated: 03/23/21 1107     Special Requests: --        RIGHT  Antecubital       Culture result: NO GROWTH 5 DAYS       SARS-COV-2, PCR [[id number]]  (Abnormal) Collected: 03/18/21 0110    Order Status: Completed Specimen: Nasopharyngeal Updated: 03/18/21 1048     Specimen source Nasopharyngeal        SARS-CoV-2 Detected        Comment:      The specimen is POSITIVE for SARS-CoV-2, the novel coronavirus associated with COVID-19. This test has been authorized by the FDA under an Emergency Use Authorization (EUA) for use by authorized laboratories.         Fact sheet for Healthcare Providers: ConventionUpdate.co.nz  Fact sheet for Patients: https://fda.gov/media/718128/download       Methodology: RT-PCR  RESULTS VERIFIED, PHONED TO AND READ BACK BY  CAMMIE INIGUEZ ON 3/18/21 @1047, TA         CULTURE, BLOOD [[id number]] Collected: 03/17/21 2245    Order Status: Canceled Specimen: Blood           EKG Results     Procedure 720 Value Units Date/Time    EKG, 12 LEAD, INITIAL [[id number]] Collected: 03/17/21 2244    Order Status: Completed Updated: 03/18/21 0645     Ventricular Rate 82 BPM      Atrial Rate 82 BPM      P-R Interval 150 ms      QRS Duration 70 ms      Q-T Interval 346 ms      QTC Calculation (Bezet) 404 ms Calculated P Axis 36 degrees      Calculated R Axis 0 degrees      Calculated T Axis 59 degrees      Diagnosis --     !! AGE AND GENDER SPECIFIC ECG ANALYSIS !! Normal sinus rhythm  Normal ECG  No previous ECGs available  Confirmed by [facility]  MD (), Geni Silveira ([id number]) on 3/18/2021 6:45:38 AM            Other Studies:  Ct Chest Wo Cont    Result Date: 3/18/2021  EXAMINATION: CT CHEST WO CONT HISTORY: History of Covid, rule out ARDS. TECHNIQUE: Axial images of the chest were obtained without the administration of intravenous contrast. Coronal reformatted images were submitted. All CT scans are performed using dose optimization technique as appropriate to a performed exam including automated exposure control and/or standardized protocols for targeted exams where dose is matched to indication/reason for exam/patient size. COMPARISON: CT heart dated 2/16/2021. FINDINGS: The visualized thyroid gland is unremarkable. There are no enlarged mediastinal, hilar, or axillary lymph nodes. The heart is not enlarged and there is no pericardial effusion. The esophagus appears normal. The airways are patent. There is no consolidation, pleural effusion, or pneumothorax. No pulmonary nodules. Bilateral groundglass infiltrates are seen, slightly greater on the right. These have developed since the previous examination. Visualized upper upper abdomen is normal. No aggressive osseous lesions. The soft tissues are normal.     Interval development of bilateral peripherally oriented, groundglass lung infiltrates. These findings are consistent with an atypical pneumonia including Covid. The appearance of the lung parenchyma at this time does not suggest ARDS. Xr Chest Port    Result Date: 3/17/2021  EXAM: XR CHEST PORT HISTORY: Covid +/ Hypoxia. TECHNIQUE: A single frontal view of the chest was submitted. COMPARISON: 3/13/2021 FINDINGS: The cardiac silhouette, mediastinum, and pulmonary vasculature are within normal limits.  There is no consolidation, pleural effusion, or pneumothorax. Allowing for overlying structures, there may be subtle increased opacity in the periphery of the right lung. No significant osseous abnormalities are observed. Question interval development of mild infiltrate in the right lung peripherally. The overlying scapula and breast tissue are limiting evaluation of this area.           Current Meds:   Current Facility-Administered Medications   Medication Dose Route Frequency    albuterol (PROVENTIL VENTOLIN) nebulizer solution 2.5 mg  2.5 mg Nebulization Q4H PRN    loperamide (IMODIUM) capsule 4 mg  4 mg Oral Q6H PRN    sertraline (ZOLOFT) tablet 50 mg  50 mg Oral DAILY    cefTRIAXone (ROCEPHIN) 1 g in 0.9% sodium chloride (MBP/ADV) 50 mL MBP  1 g IntraVENous Q24H    LORazepam (ATIVAN) tablet 1 mg  1 mg Oral BID PRN    sodium chloride (OCEAN) 0.65 % nasal squeeze bottle 2 Spray  2 Spray Both Nostrils Q2H PRN    busPIRone (BUSPAR) tablet 15 mg  15 mg Oral BID PRN    budesonide-formoteroL (SYMBICORT) 160-4.5 mcg/actuation HFA inhaler 2 Puff  2 Puff Inhalation BID RT    tiotropium bromide (SPIRIVA RESPIMAT) 2.5 mcg /actuation  2 Puff Inhalation DAILY    dexAMETHasone (DECADRON) tablet 6 mg  6 mg Oral DAILY    levothyroxine (SYNTHROID) tablet 50 mcg  50 mcg Oral ACB    baclofen (LIORESAL) tablet 5 mg  5 mg Oral Q12H PRN    rosuvastatin (CRESTOR) tablet 20 mg  20 mg Oral QHS    sodium chloride (NS) flush 5-40 mL  5-40 mL IntraVENous Q8H    sodium chloride (NS) flush 5-40 mL  5-40 mL IntraVENous PRN    acetaminophen (TYLENOL) tablet 650 mg  650 mg Oral Q6H PRN    Or    acetaminophen (TYLENOL) suppository 650 mg  650 mg Rectal Q6H PRN    polyethylene glycol (MIRALAX) packet 17 g  17 g Oral DAILY    senna (SENOKOT) tablet 8.6 mg  1 Tab Oral DAILY PRN    bisacodyL (DULCOLAX) suppository 10 mg  10 mg Rectal DAILY PRN    ondansetron (ZOFRAN) injection 4 mg  4 mg IntraVENous Q6H PRN    famotidine (PEPCID) tablet 20 mg  20 mg Oral BID    enoxaparin (LOVENOX) injection 40 mg  40 mg SubCUTAneous Q12H    guaiFENesin-dextromethorphan (ROBITUSSIN DM) 100-10 mg/5 mL syrup 5 mL  5 mL Oral Q4H PRN    cholecalciferol (VITAMIN D3) (5000 Units/125 mcg) tablet 5,000 Units  5,000 Units Oral DAILY    0.9% sodium chloride infusion 250 mL  250 mL IntraVENous PRN       Problem List:  Hospital Problems as of 3/25/2021 Date Reviewed: 3/18/2021          Codes Class Noted - Resolved POA    * (Principal) Acute respiratory failure due to COVID-19 [facility]) ICD-10-CM: U07.1, J96.00  ICD-9-CM: 518.81, 079.89  3/18/2021 - Present Yes        Essential hypertension, benign ICD-10-CM: I10  ICD-9-CM: 401.1  12/16/2013 - Present Yes        Hypothyroidism, acquired, autoimmune ICD-10-CM: E06.3  ICD-9-CM: 244.8  12/16/2013 - Present Yes        Hyperlipidemia with target low density lipoprotein (LDL) cholesterol less than 130 mg/dL ICD-10-CM: E78.5  ICD-9-CM: 272.4  12/16/2013 - Present Yes        Vitamin D deficiency ICD-10-CM: E55.9  ICD-9-CM: 268.9  12/16/2013 - Present Yes               Part of this note was written by using a voice dictation software and the note has been proof read but may still contain some grammatical/other typographical errors.     Signed By: Suma Whitt MD   [facility]    March 24, 2021  1:57 PM

## 2021-03-25 NOTE — PROGRESS NOTES
Comprehensive Nutrition Assessment  This assessment was completed remotely from within the hospital. Patient consent was obtained for remote assessment. Type and Reason for Visit: Initial, RD nutrition re-screen/LOS  LOS Day 7    Nutrition Recommendations/Plan:    Continue with current diet     Nutrition Assessment:   Nutrition History: no significant nutritional history      Nutrition Background: PMH significant for HTN, hyperlipidemia. Presented with shortness of breath, fever, aches, diarrhea. Patient in acute respiratory failure r/t covid. Daily Update:  Spoke with patient over the phone. She reports that a few days prior to admission and the first few days of admission she had a very hit or miss appetite. She reports that her appetite is much better now. She reports eating about 50% of each meal, before she would skip or eat only a bite of some meals. She reports no N/V/D. Patient thinks it is possible she has loss some weight but unsure how much. Also at this time patient declined any nutritional supplement and would like to just eat real food.      Nutrition Related Findings:   NFPE deferred r/t covid isolation      Current Nutrition Therapies:  DIET REGULAR    Current Intake:   Average Meal Intake: 51-75% Average Supplement Intake: None ordered      Anthropometric Measures:  Height: 5' 2\" (157.5 cm)  Current Body Wt: 82.4 kg (181 lb 10.5 oz)(3/18), Weight source: Bed scale  BMI: 33.2, Obese class 1 (BMI 30.0-34.9)     Ideal Body Weight (lbs) (Calculated): 110 lbs (50 kg), 165.1 %  Usual Body Wt: 83.9 kg (185 lb)(per pt), Percent weight change: -1.8          Edema: No data recorded   Estimated Daily Nutrient Needs:  Energy (kcal/day): 5907-8994 (Kcal/kg(20-25), Weight Used: Current(84.6 kg))  Protein (g/day):  (20% kcal) Weight Used: ( )  Fluid (ml/day):   (1 ml/kcal)    Nutrition Diagnosis:   · Inadequate oral intake related to inadequate protein-energy intake(decreased appetite) as evidenced by intake 51-75%, weight loss    Nutrition Interventions:   Food and/or Nutrient Delivery: Continue current diet(pt decline nutritional supplement)     Coordination of Nutrition Care: Continue to monitor while inpatient    Goals: Active Goal: Intake >755 of nutritional needs within 7 days    Nutrition Monitoring and Evaluation:      Food/Nutrient Intake Outcomes: Food and nutrient intake       Discharge Planning:     Too soon to determine    Electronically signed by Colin Rucker MS, RD, LD on 3/25/2021 at 11:56 AM.  Contact: [phone]

## 2021-03-25 NOTE — PROGRESS NOTES
's visit via telephone call. I conveyed care and concern for patient and offered spiritual interventions including active listening, exploration of coping skills, and affirmation of emotions/angela.    Ms. Leigh Smoker expressed gratitude for the care she has been receiving from staff, especially from her nurse Jonas Reyes, [facility]

## 2021-03-26 PROCEDURE — 74011250637 HC RX REV CODE- 250/637: Performed by: INTERNAL MEDICINE

## 2021-03-26 PROCEDURE — 74011250636 HC RX REV CODE- 250/636: Performed by: HOSPITALIST

## 2021-03-26 PROCEDURE — 97535 SELF CARE MNGMENT TRAINING: CPT

## 2021-03-26 PROCEDURE — 65270000029 HC RM PRIVATE

## 2021-03-26 PROCEDURE — 74011000258 HC RX REV CODE- 258: Performed by: INTERNAL MEDICINE

## 2021-03-26 PROCEDURE — 94760 N-INVAS EAR/PLS OXIMETRY 1: CPT

## 2021-03-26 PROCEDURE — 77010033678 HC OXYGEN DAILY

## 2021-03-26 PROCEDURE — 94762 N-INVAS EAR/PLS OXIMTRY CONT: CPT

## 2021-03-26 PROCEDURE — 94640 AIRWAY INHALATION TREATMENT: CPT

## 2021-03-26 PROCEDURE — 97530 THERAPEUTIC ACTIVITIES: CPT

## 2021-03-26 PROCEDURE — 74011250637 HC RX REV CODE- 250/637: Performed by: HOSPITALIST

## 2021-03-26 PROCEDURE — 74011250636 HC RX REV CODE- 250/636: Performed by: INTERNAL MEDICINE

## 2021-03-26 RX ADMIN — DEXAMETHASONE 6 MG: 4 TABLET ORAL at 08:22

## 2021-03-26 RX ADMIN — CHOLECALCIFEROL TAB 125 MCG (5000 UNIT) 5000 UNITS: 125 TAB at 08:23

## 2021-03-26 RX ADMIN — Medication 10 ML: at 13:23

## 2021-03-26 RX ADMIN — ENOXAPARIN SODIUM 40 MG: 40 INJECTION SUBCUTANEOUS at 21:05

## 2021-03-26 RX ADMIN — POLYETHYLENE GLYCOL 3350 17 G: 17 POWDER, FOR SOLUTION ORAL at 08:23

## 2021-03-26 RX ADMIN — TIOTROPIUM BROMIDE INHALATION SPRAY 2 PUFF: 3.12 SPRAY, METERED RESPIRATORY (INHALATION) at 08:08

## 2021-03-26 RX ADMIN — Medication 10 ML: at 06:06

## 2021-03-26 RX ADMIN — BUDESONIDE AND FORMOTEROL FUMARATE DIHYDRATE 2 PUFF: 160; 4.5 AEROSOL RESPIRATORY (INHALATION) at 07:25

## 2021-03-26 RX ADMIN — ROSUVASTATIN CALCIUM 20 MG: 20 TABLET, COATED ORAL at 21:05

## 2021-03-26 RX ADMIN — FAMOTIDINE 20 MG: 20 TABLET, FILM COATED ORAL at 08:22

## 2021-03-26 RX ADMIN — LEVOTHYROXINE SODIUM 50 MCG: 0.05 TABLET ORAL at 07:35

## 2021-03-26 RX ADMIN — ENOXAPARIN SODIUM 40 MG: 40 INJECTION SUBCUTANEOUS at 08:23

## 2021-03-26 RX ADMIN — BUDESONIDE AND FORMOTEROL FUMARATE DIHYDRATE 2 PUFF: 160; 4.5 AEROSOL RESPIRATORY (INHALATION) at 20:20

## 2021-03-26 RX ADMIN — LORAZEPAM 1 MG: 1 TABLET ORAL at 21:11

## 2021-03-26 RX ADMIN — FAMOTIDINE 20 MG: 20 TABLET, FILM COATED ORAL at 17:18

## 2021-03-26 RX ADMIN — CEFTRIAXONE SODIUM 1 G: 1 INJECTION, POWDER, FOR SOLUTION INTRAMUSCULAR; INTRAVENOUS at 11:50

## 2021-03-26 RX ADMIN — Medication 10 ML: at 21:05

## 2021-03-26 RX ADMIN — SERTRALINE 50 MG: 50 TABLET, FILM COATED ORAL at 08:22

## 2021-03-26 NOTE — PROGRESS NOTES
[facility] Progress Note     Name:  Brenad John  Age:60 y.o. Sex:female   :  1960    MRN:  [id number]     Admit Date:  3/17/2021    Reason for Admission:  COVID-19 [U07.1]    Assessment & Plan       Acute hypoxic respiratory failure due to COVID 19 pneumonia:  Weaning O2 as tolerated  Continued vitamin C, vitamin D  Continued symbicort, spiriva and prn albuterol  On course of rocephin through 3/26  Decadron through 3/28  3/26  - down to 9L HFNC     Facial swelling, resolved    HTN:  Holding lisinopril     Hypothyroidism:  Continued synthroid    Diet:  DIET REGULAR  DVT PPx: lovenox  GI Ppx: pepcid  Code: Full Code    Dispo/Discharge Planning: likely home in 2-3 days    Hospital Course/Subjective:     Ms. Freddie Cole is a 61 yo female admitted with COVID 19, acute hypoxic respiratory failure. She has required AIRVO. She has been on course of antibiotics, decadron, remdesivir and s/p convalescent plasma. She has received intermittent lasix diuretics. CT chest without contrast shows GG opacities. Subjective/24 hr Events (21):    Continues to improve. Asking when she can go home.     Objective:     Patient Vitals for the past 24 hrs:   Temp Pulse Resp BP SpO2   21 0725     95 %   21 0721 97.2 °F (36.2 °C) (!) 59 16 115/68 97 %   21 0409 97.7 °F (36.5 °C) 60 18 116/67 98 %   21 2355 98.6 °F (37 °C) 61 18 106/62 97 %   21 2138     96 %   21 1902 98.4 °F (36.9 °C) 78 18 117/67 96 %   21 1517 98.8 °F (37.1 °C) 74 18 130/73 97 %   21 1124 98.1 °F (36.7 °C) 70 22 118/76 98 %     Oxygen Therapy  O2 Sat (%): 95 % (21 0725)  Pulse via Oximetry: 60 beats per minute (21 0725)  O2 Device: Hi flow nasal cannula;Humidifier (21)  Skin Assessment: Clean, dry, & intact (21)  Skin Protection for O2 Device: N/A (21)  O2 Flow Rate (L/min): 9 l/min (21)  O2 Temperature: 87.8 °F (31 °C) (21 0831)  FIO2 (%): 60 % (03/25/21 1922)    Body mass index is 34.13 kg/m². Physical Exam:   General: No acute distress, speaking in full sentences, no use of accessory muscles   HEENT: facial swelling resolved  Lungs: Coarse  Cardiovascular: Regular rate and rhythm with normal S1 and S2 , no edema   Abdomen: Soft, nontender, nondistended, normoactive bowel sounds   Extremities: No cyanosis clubbing or edema   Neuro: Nonfocal,  Psych: Normal affect     Data Review:  I have reviewed all labs, meds, and studies from the last 24 hours:    Labs:    No results found for this or any previous visit (from the past 24 hour(s)). All Micro Results     Procedure Component Value Units Date/Time    CULTURE, BLOOD [[id number]] Collected: 03/17/21 2244    Order Status: Completed Specimen: Blood Updated: 03/23/21 1107     Special Requests: --        RIGHT  Antecubital       Culture result: NO GROWTH 5 DAYS       SARS-COV-2, PCR [[id number]]  (Abnormal) Collected: 03/18/21 0110    Order Status: Completed Specimen: Nasopharyngeal Updated: 03/18/21 1048     Specimen source Nasopharyngeal        SARS-CoV-2 Detected        Comment:      The specimen is POSITIVE for SARS-CoV-2, the novel coronavirus associated with COVID-19. This test has been authorized by the FDA under an Emergency Use Authorization (EUA) for use by authorized laboratories.         Fact sheet for Healthcare Providers: ConventionUpdate.co.nz  Fact sheet for Patients: https://fda.gov/media/841587/download       Methodology: RT-PCR  RESULTS VERIFIED, PHONED TO AND READ BACK BY  CAMMIE INIGUEZ ON 3/18/21 @1047, TA         CULTURE, BLOOD [[id number]] Collected: 03/17/21 2245    Order Status: Canceled Specimen: Blood           EKG Results     Procedure 720 Value Units Date/Time    EKG, 12 LEAD, INITIAL [[id number]] Collected: 03/17/21 2244    Order Status: Completed Updated: 03/18/21 0645     Ventricular Rate 82 BPM      Atrial Rate 82 BPM      P-R Interval 150 ms      QRS Duration 70 ms      Q-T Interval 346 ms      QTC Calculation (Bezet) 404 ms      Calculated P Axis 36 degrees      Calculated R Axis 0 degrees      Calculated T Axis 59 degrees      Diagnosis --     !! AGE AND GENDER SPECIFIC ECG ANALYSIS !! Normal sinus rhythm  Normal ECG  No previous ECGs available  Confirmed by [facility]  MD (), Juana Powers ([id number]) on 3/18/2021 6:45:38 AM            Other Studies:  Ct Chest Wo Cont    Result Date: 3/18/2021  EXAMINATION: CT CHEST WO CONT HISTORY: History of Covid, rule out ARDS. TECHNIQUE: Axial images of the chest were obtained without the administration of intravenous contrast. Coronal reformatted images were submitted. All CT scans are performed using dose optimization technique as appropriate to a performed exam including automated exposure control and/or standardized protocols for targeted exams where dose is matched to indication/reason for exam/patient size. COMPARISON: CT heart dated 2/16/2021. FINDINGS: The visualized thyroid gland is unremarkable. There are no enlarged mediastinal, hilar, or axillary lymph nodes. The heart is not enlarged and there is no pericardial effusion. The esophagus appears normal. The airways are patent. There is no consolidation, pleural effusion, or pneumothorax. No pulmonary nodules. Bilateral groundglass infiltrates are seen, slightly greater on the right. These have developed since the previous examination. Visualized upper upper abdomen is normal. No aggressive osseous lesions. The soft tissues are normal.     Interval development of bilateral peripherally oriented, groundglass lung infiltrates. These findings are consistent with an atypical pneumonia including Covid. The appearance of the lung parenchyma at this time does not suggest ARDS. Xr Chest Port    Result Date: 3/17/2021  EXAM: XR CHEST PORT HISTORY: Covid +/ Hypoxia. TECHNIQUE: A single frontal view of the chest was submitted.  COMPARISON: 3/13/2021 FINDINGS: The cardiac silhouette, mediastinum, and pulmonary vasculature are within normal limits. There is no consolidation, pleural effusion, or pneumothorax. Allowing for overlying structures, there may be subtle increased opacity in the periphery of the right lung. No significant osseous abnormalities are observed. Question interval development of mild infiltrate in the right lung peripherally. The overlying scapula and breast tissue are limiting evaluation of this area.           Current Meds:   Current Facility-Administered Medications   Medication Dose Route Frequency    albuterol (PROVENTIL VENTOLIN) nebulizer solution 2.5 mg  2.5 mg Nebulization Q4H PRN    loperamide (IMODIUM) capsule 4 mg  4 mg Oral Q6H PRN    sertraline (ZOLOFT) tablet 50 mg  50 mg Oral DAILY    cefTRIAXone (ROCEPHIN) 1 g in 0.9% sodium chloride (MBP/ADV) 50 mL MBP  1 g IntraVENous Q24H    LORazepam (ATIVAN) tablet 1 mg  1 mg Oral BID PRN    sodium chloride (OCEAN) 0.65 % nasal squeeze bottle 2 Spray  2 Spray Both Nostrils Q2H PRN    busPIRone (BUSPAR) tablet 15 mg  15 mg Oral BID PRN    budesonide-formoteroL (SYMBICORT) 160-4.5 mcg/actuation HFA inhaler 2 Puff  2 Puff Inhalation BID RT    tiotropium bromide (SPIRIVA RESPIMAT) 2.5 mcg /actuation  2 Puff Inhalation DAILY    dexAMETHasone (DECADRON) tablet 6 mg  6 mg Oral DAILY    levothyroxine (SYNTHROID) tablet 50 mcg  50 mcg Oral ACB    baclofen (LIORESAL) tablet 5 mg  5 mg Oral Q12H PRN    rosuvastatin (CRESTOR) tablet 20 mg  20 mg Oral QHS    sodium chloride (NS) flush 5-40 mL  5-40 mL IntraVENous Q8H    sodium chloride (NS) flush 5-40 mL  5-40 mL IntraVENous PRN    acetaminophen (TYLENOL) tablet 650 mg  650 mg Oral Q6H PRN    Or    acetaminophen (TYLENOL) suppository 650 mg  650 mg Rectal Q6H PRN    polyethylene glycol (MIRALAX) packet 17 g  17 g Oral DAILY    senna (SENOKOT) tablet 8.6 mg  1 Tab Oral DAILY PRN    bisacodyL (DULCOLAX) suppository 10 mg  10 mg Rectal DAILY PRN    ondansetron (ZOFRAN) injection 4 mg  4 mg IntraVENous Q6H PRN    famotidine (PEPCID) tablet 20 mg  20 mg Oral BID    enoxaparin (LOVENOX) injection 40 mg  40 mg SubCUTAneous Q12H    guaiFENesin-dextromethorphan (ROBITUSSIN DM) 100-10 mg/5 mL syrup 5 mL  5 mL Oral Q4H PRN    cholecalciferol (VITAMIN D3) (5000 Units/125 mcg) tablet 5,000 Units  5,000 Units Oral DAILY    0.9% sodium chloride infusion 250 mL  250 mL IntraVENous PRN       Problem List:  Hospital Problems as of 3/26/2021 Date Reviewed: 3/18/2021          Codes Class Noted - Resolved POA    * (Principal) Acute respiratory failure due to COVID-19 [facility]) ICD-10-CM: U07.1, J96.00  ICD-9-CM: 518.81, 079.89  3/18/2021 - Present Yes        Essential hypertension, benign ICD-10-CM: I10  ICD-9-CM: 401.1  12/16/2013 - Present Yes        Hypothyroidism, acquired, autoimmune ICD-10-CM: E06.3  ICD-9-CM: 244.8  12/16/2013 - Present Yes        Hyperlipidemia with target low density lipoprotein (LDL) cholesterol less than 130 mg/dL ICD-10-CM: E78.5  ICD-9-CM: 272.4  12/16/2013 - Present Yes        Vitamin D deficiency ICD-10-CM: E55.9  ICD-9-CM: 268.9  12/16/2013 - Present Yes               Part of this note was written by using a voice dictation software and the note has been proof read but may still contain some grammatical/other typographical errors.     Signed By: Pavel Su MD   [employer] [facility]    March 26, 2021  1:57 PM

## 2021-03-26 NOTE — PROGRESS NOTES
Problem: Breathing Pattern - Ineffective  Goal: *Absence of hypoxia  Outcome: Progressing Towards Goal  Goal: *Use of effective breathing techniques  Outcome: Progressing Towards Goal  Goal: *PALLIATIVE CARE:  Alleviation of Dyspnea  Outcome: Progressing Towards Goal     Problem: Patient Education: Go to Patient Education Activity  Goal: Patient/Family Education  Outcome: Progressing Towards Goal     Problem: Risk for Spread of Infection  Goal: Prevent transmission of infectious organism to others  Description: Prevent the transmission of infectious organisms to other patients, staff members, and visitors. Outcome: Progressing Towards Goal     Problem: Patient Education:  Go to Education Activity  Goal: Patient/Family Education  Outcome: Progressing Towards Goal     Problem: Patient Education: Go to Patient Education Activity  Goal: Patient/Family Education  Outcome: Progressing Towards Goal     Problem: Patient Education: Go to Patient Education Activity  Goal: Patient/Family Education  Outcome: Progressing Towards Goal     Problem: Gas Exchange - Impaired  Goal: Absence of hypoxia  Outcome: Progressing Towards Goal  Goal: Promote optimal lung function  Outcome: Progressing Towards Goal     Problem: Risk for Fluid Volume Deficit  Goal: Maintain normal heart rhythm  Outcome: Progressing Towards Goal  Goal: Maintain absence of muscle cramping  Outcome: Progressing Towards Goal  Goal: Maintain normal serum potassium, sodium, calcium, phosphorus, and pH  Outcome: Progressing Towards Goal     Problem: Loneliness or Risk for Loneliness  Goal: Demonstrate positive use of time alone when socialization is not possible  Outcome: Progressing Towards Goal     Problem: Fatigue  Goal: Verbalize increase energy and improved vitality  Outcome: Progressing Towards Goal     Problem: Falls - Risk of  Goal: *Absence of Falls  Description: Document Nikia Fall Risk and appropriate interventions in the flowsheet.   Outcome: Progressing Towards Goal  Note: Fall Risk Interventions:            Medication Interventions: Evaluate medications/consider consulting pharmacy, Patient to call before getting OOB, Teach patient to arise slowly                   Problem: Patient Education: Go to Patient Education Activity  Goal: Patient/Family Education  Outcome: Progressing Towards Goal     Problem: Nutrition Deficit  Goal: *Optimize nutritional status  Outcome: Progressing Towards Goal

## 2021-03-26 NOTE — PROGRESS NOTES
ACUTE PHYSICAL THERAPY GOALS:  (Developed with and agreed upon by patient and/or caregiver. )  LTG:  (1.)Ms. Bill Lozada will move from supine to sit and sit to supine, scoot up and down and roll side to side in flat bed with INDEPENDENT within 7 day(s). (2.)Ms. Bill Lozada will ambulate with modified INDEPENDENCE for 75+ feet with the least restrictive/no device within 7 day(s). (3.)Ms. Bill Lozada will perform standing static and dynamic balance activities x 8 minutes with SUPERVISION to improve safety within 7 day(s). PHYSICAL THERAPY: Daily Note Treatment Day # 4    Neha Childress is a 62 y.o. female   PRIMARY DIAGNOSIS: Acute respiratory failure due to COVID-19 ([facility] [address]  COVID-19 [U07.1]         ASSESSMENT:     REHAB RECOMMENDATIONS: CURRENT LEVEL OF FUNCTION:  (Most Recently Demonstrated)   Recommendation to date pending progress:  [address] [place] [address]  Equipment:    To Be Determined Bed Mobility:   Not tested  Sit to Stand:   Supervision  Transfers:   Standby Assistance  Gait/Mobility:   Contact Guard Assistance     ASSESSMENT:  Ms. Bill Lozada sitting in recliner on O2 via nasal cannula. Patient demonstrated progress with gait and activity tolerance today. SpO2 remained >90% throughout treatment. . Will continue efforts. SUBJECTIVE:   Ms. Bill Lozada states, \"I'll try. \"    SOCIAL HISTORY/ LIVING ENVIRONMENT: At baseline she lives alone and is independent with all ADLs, IADLs, mobility, and working. Home Environment: Private residence  One/Two Story Residence: One story  Living Alone: No  Support Systems: Family member(s)  OBJECTIVE:     PAIN: VITAL SIGNS: LINES/DRAINS:   Pre Treatment: Pain Screen  Pain Scale 1: Numeric (0 - 10)  Pain Intensity 1: 0  Post Treatment: 0 SpO2 remained >90% throughout treatment.  Continuous pulse ox  O2 Device: Hi flow nasal cannula     MOBILITY: I Mod I S SBA CGA Min Mod Max Total  NT x2 Comments:   Bed Mobility    Rolling [] [] [] [] [] [] [] [] [] [x] []    Supine to Sit [] [] [] [] [] [] [] [] [] [x] []    Scooting [] [] [] [] [] [] [] [] [] [x] []    Sit to Supine [] [] [] [] [] [] [] [] [] [x] []    Transfers    Sit to Stand [] [] [x] [] [] [] [] [] [] [] []    Bed to Chair [] [] [] [x] [] [] [] [] [] [] []    Stand to Sit [] [] [x] [] [] [] [] [] [] [] []    I=Independent, Mod I=Modified Independent, S=Supervision, SBA=Standby Assistance, CGA=Contact Guard Assistance,   Min=Minimal Assistance, Mod=Moderate Assistance, Max=Maximal Assistance, Total=Total Assistance, NT=Not Tested    BALANCE: Good Fair+ Fair Fair- Poor NT Comments   Sitting Static [x] [] [] [] [] []    Sitting Dynamic [x] [] [] [] [] []              Standing Static [] [x] [] [] [] []    Standing Dynamic [] [] [x] [] [] []      GAIT: I Mod I S SBA CGA Min Mod Max Total  NT x2 Comments:   Level of Assistance [] [] [] [] [x] [] [] [] [] [] []    Distance 50'    DME None    Gait Quality Path deviations, slow, unsteady    Weightbearing  Status N/A     I=Independent, Mod I=Modified Independent, S=Supervision, SBA=Standby Assistance, CGA=Contact Guard Assistance,   Min=Minimal Assistance, Mod=Moderate Assistance, Max=Maximal Assistance, Total=Total Assistance, NT=Not Tested    PLAN:   FREQUENCY/DURATION: PT Plan of Care: 3 times/week for duration of hospital stay or until stated goals are met, whichever comes first.  TREATMENT:     TREATMENT:   ($$ Therapeutic Activity: 23-37 mins    )  Therapeutic Activity (23 Minutes): Therapeutic activity included Supine to Sit, Sit to Supine, Scooting, Transfer Training, Standing balance and exercises to improve functional Mobility, Strength and Activity tolerance.     TREATMENT GRID:  N/A  DATE: 3/19/21  3/24/21  3/26/21       Ambulation             Hip Flexion x10 AB    x20 AB       Long Arc Quads x10 AB    x20 AB       Hip ab/ad      2x10 AB standing       Heel Raises    x20 AB standing  x20 AB       Toe Raises      x20 AB        bridging    x20          SLR   2x10 AB          sit to stand     x5       Marching in stand   x20 AB                       Key:  A=active, AA=active assisted, B=bilaterally, R=right, L=left    AFTER TREATMENT POSITION/PRECAUTIONS:  Chair, Needs within reach and RN notified    INTERDISCIPLINARY COLLABORATION:  RN/PCT and PT/PTA    TOTAL TREATMENT DURATION:  PT Patient Time In/Time Out  Time In: 1100  Time Out: [facility], PT, DPT

## 2021-03-26 NOTE — PROGRESS NOTES
ACUTE OT GOALS:  (Developed with and agreed upon by patient and/or caregiver.)    1) Patient will complete lower body bathing and dressing with modified independence and adaptive equipment as needed. 2) Patient will complete toileting with modified independence. 3) Patient will complete functional transfers with modified independence and adaptive equipment as needed. 4) Patient will tolerate at least 20 minutes of OT activity with as needed rest breaks while maintaining O2 sats >90%. 5) Patient will verbalize at least 3 energy conservation technique to utilize during ADL/IADL. Timeframe: 7 visits     OCCUPATIONAL THERAPY: Daily Note OT Treatment Day # 3    Janet Cagle is a 62 y.o. female   PRIMARY DIAGNOSIS: [facility] respiratory [facility]  COVID-19 [U07.1]       Payor: BLUE CROSS / Plan: [address] / Product Type: PPO /   ASSESSMENT:     REHAB RECOMMENDATIONS: CURRENT LEVEL OF FUNCTION:  (Most Recently Demonstrated)   Recommendation to date pending progress:  [address] [place] [address]  Equipment:    To Be Determined Bathing:   Not tested  Dressing:   Set Up  Feeding/Grooming:   Minimal Assistance  Toileting:   Modified Independent  Functional Mobility:   Standby Assistance     ASSESSMENT:  Ms. Heidi Freire has been weaned to nasal cannula (9L) and was able to maintain O2 sats >90% for entire session. BP dropped from 124/88 in sitting to 96/72 in standing. RN aware. Participated in seated ADLs. Great progress! SUBJECTIVE:   Ms. Heidi Freire states, \"I've been here too long. \"    SOCIAL HISTORY/LIVING ENVIRONMENT:Lives alone, independent with all ADLs, IADLs, and driving. Works in [employer] [address]    Home Environment: Private residence  One/Two Story Residence: One story  Living Alone: No  Support Systems: Family member(s)    OBJECTIVE:     PAIN: VITAL SIGNS: LINES/DRAINS:   Pre Treatment: Pain Screen  Pain Scale 1: Numeric (0 - 10)  Pain Intensity 1: 0  Post Treatment: 0 Vital Signs  BP: 124/88  MAP (Calculated): 100  BP Patient Position: Sitting  More BP/Pulse rows needed?: Yes  O2 Device: Hi flow nasal cannula  O2 Flow Rate (L/min): 9 l/min  Additional Blood Pressure/Pulse Data  BP 2: 96/72  MAP 2 (Calculated): 80  Patient Position 2: Standing   O2 >90% entire session  IV  O2 Device: Hi flow nasal cannula, Humidifier     ACTIVITIES OF DAILY LIVING: I Mod I S SBA CGA Min Mod Max Total NT Comments   BASIC ADLs:              Bathing/ Showering [] [] [] [] [] [] [] [] [] [x]    Toileting [] [x] [] [] [] [] [] [] [] []    Dressing [] [] [x] [] [] [] [] [] [] [] Socks    Feeding [] [] [x] [] [] [] [] [] [] []    Grooming [] [] [x] [] [] [] [] [] [] [] S- brushing teeth, Combing hair, fixing hair, shampoo cap    Personal Device Care [] [] [] [] [] [] [] [] [] [x]    Functional Mobility [] [] [] [x] [] [] [] [] [] []    I=Independent, Mod I=Modified Independent, S=Supervision, SBA=Standby Assistance, CGA=Contact Guard Assistance,   Min=Minimal Assistance, Mod=Moderate Assistance, Max=Maximal Assistance, Total=Total Assistance, NT=Not Tested    MOBILITY: I Mod I S SBA CGA Min Mod Max Total  NT x2 Comments:   Supine to sit [] [] [x] [] [] [] [] [] [] [] []    Sit to supine [] [] [x] [] [] [] [] [] [] [] []    Sit to stand [] [] [] [x] [] [] [] [] [] [] []    Bed to chair [] [] [] [x] [] [] [] [] [] [] []    I=Independent, Mod I=Modified Independent, S=Supervision, SBA=Standby Assistance, CGA=Contact Guard Assistance,   Min=Minimal Assistance, Mod=Moderate Assistance, Max=Maximal Assistance, Total=Total Assistance, NT=Not Tested      PLAN:   FREQUENCY/DURATION: OT Plan of Care: 3 times/week for duration of hospital stay or until stated goals are met, whichever comes first.    TREATMENT:   TREATMENT:   ($$ Self Care/Home Management: 38-52 mins    )  Self Care (39 Minutes): Self care including Toileting, Lower Body Dressing, Grooming and functional transfers from bed to chair.  to increase independence and activity tolerance.         AFTER TREATMENT POSITION/PRECAUTIONS:  Chair, Needs within reach and RN notified    INTERDISCIPLINARY COLLABORATION:  RN/PCT and OT/MCCONNELL    TOTAL TREATMENT DURATION:  OT Patient Time In/Time Out  Time In: 1020  Time Out: 8[facility] Asad OTR/SUJATHA

## 2021-03-27 PROCEDURE — 74011250636 HC RX REV CODE- 250/636: Performed by: INTERNAL MEDICINE

## 2021-03-27 PROCEDURE — 74011250637 HC RX REV CODE- 250/637: Performed by: INTERNAL MEDICINE

## 2021-03-27 PROCEDURE — 74011250637 HC RX REV CODE- 250/637: Performed by: HOSPITALIST

## 2021-03-27 PROCEDURE — 74011250636 HC RX REV CODE- 250/636: Performed by: HOSPITALIST

## 2021-03-27 PROCEDURE — 65270000029 HC RM PRIVATE

## 2021-03-27 PROCEDURE — 94640 AIRWAY INHALATION TREATMENT: CPT

## 2021-03-27 PROCEDURE — 94762 N-INVAS EAR/PLS OXIMTRY CONT: CPT

## 2021-03-27 PROCEDURE — 74011000250 HC RX REV CODE- 250: Performed by: INTERNAL MEDICINE

## 2021-03-27 PROCEDURE — 77010033678 HC OXYGEN DAILY

## 2021-03-27 RX ADMIN — BUDESONIDE AND FORMOTEROL FUMARATE DIHYDRATE 2 PUFF: 160; 4.5 AEROSOL RESPIRATORY (INHALATION) at 08:45

## 2021-03-27 RX ADMIN — FAMOTIDINE 20 MG: 20 TABLET, FILM COATED ORAL at 08:12

## 2021-03-27 RX ADMIN — CHOLECALCIFEROL TAB 125 MCG (5000 UNIT) 5000 UNITS: 125 TAB at 08:12

## 2021-03-27 RX ADMIN — LORAZEPAM 1 MG: 1 TABLET ORAL at 21:59

## 2021-03-27 RX ADMIN — SERTRALINE 50 MG: 50 TABLET, FILM COATED ORAL at 08:12

## 2021-03-27 RX ADMIN — TIOTROPIUM BROMIDE INHALATION SPRAY 2 PUFF: 3.12 SPRAY, METERED RESPIRATORY (INHALATION) at 08:45

## 2021-03-27 RX ADMIN — Medication 10 ML: at 14:47

## 2021-03-27 RX ADMIN — ENOXAPARIN SODIUM 40 MG: 40 INJECTION SUBCUTANEOUS at 21:59

## 2021-03-27 RX ADMIN — DEXAMETHASONE 6 MG: 4 TABLET ORAL at 08:12

## 2021-03-27 RX ADMIN — Medication 10 ML: at 21:59

## 2021-03-27 RX ADMIN — LEVOTHYROXINE SODIUM 50 MCG: 0.05 TABLET ORAL at 08:12

## 2021-03-27 RX ADMIN — BUDESONIDE AND FORMOTEROL FUMARATE DIHYDRATE 2 PUFF: 160; 4.5 AEROSOL RESPIRATORY (INHALATION) at 20:04

## 2021-03-27 RX ADMIN — POLYETHYLENE GLYCOL 3350 17 G: 17 POWDER, FOR SOLUTION ORAL at 08:12

## 2021-03-27 RX ADMIN — ALBUTEROL SULFATE 2.5 MG: 2.5 SOLUTION RESPIRATORY (INHALATION) at 08:45

## 2021-03-27 RX ADMIN — FAMOTIDINE 20 MG: 20 TABLET, FILM COATED ORAL at 17:40

## 2021-03-27 RX ADMIN — ROSUVASTATIN CALCIUM 20 MG: 20 TABLET, COATED ORAL at 21:59

## 2021-03-27 RX ADMIN — ENOXAPARIN SODIUM 40 MG: 40 INJECTION SUBCUTANEOUS at 08:12

## 2021-03-27 NOTE — PROGRESS NOTES
[facility] Hospitalist Progress Note     Name:  Warren Barrow  Age:60 y.o. Sex:female   :  1960    MRN:  [id number]     Admit Date:  3/17/2021    Reason for Admission:  COVID-19 [U07.1]    Assessment & Plan       Acute hypoxic respiratory failure due to COVID 19 pneumonia:  Weaning O2 as tolerated  Continued vitamin C, vitamin D  Continued symbicort, spiriva and prn albuterol  Completed course of abx  Decadron through 3/28  3/26  - down to 9L HFNC   3/27  - down to 5L HFNC    HTN:  Holding lisinopril     Hypothyroidism:  Continued synthroid    Diet:  DIET REGULAR  DVT PPx: lovenox  GI Ppx: pepcid  Code: Full Code    Dispo/Discharge Planning: likely home in 1-2 days    Hospital Course/Subjective:     Ms. Drake Hardin is a 61 yo female admitted with COVID 19, acute hypoxic respiratory failure. She has required AIRVO. She has been on course of antibiotics, decadron, remdesivir and s/p convalescent plasma. She has received intermittent lasix diuretics. CT chest without contrast shows GG opacities. Subjective/24 hr Events (21): No new issues. Hopeful to go home soon.     Objective:     Patient Vitals for the past 24 hrs:   Temp Pulse Resp BP SpO2   21 0845     96 %   21 0800 98.5 °F (36.9 °C) 67 18 119/76 97 %   21 0352 97.8 °F (36.6 °C) 60 20 123/74 97 %   21 0020     94 %   21 2318 97.9 °F (36.6 °C) 65 16 116/68 97 %   21 2048 97.9 °F (36.6 °C) 80 16 118/65 96 %   21 2020     94 %   21 1552     97 %   21 1537 98.8 °F (37.1 °C) 76 16 128/76 97 %   21 1308     98 %   21 1056 98.1 °F (36.7 °C) 77 12 120/78 97 %     Oxygen Therapy  O2 Sat (%): 96 % (21 0845)  Pulse via Oximetry: 62 beats per minute (21 0845)  O2 Device: Nasal cannula (21 0845)  Skin Assessment: Clean, dry, & intact (21 0020)  Skin Protection for O2 Device: No (21 1552)  O2 Flow Rate (L/min): 4 l/min(decreased to 4L) (03/27/21 0845)  O2 Temperature: 87.8 °F (31 °C) (03/24/21 0831)  FIO2 (%): 60 % (03/25/21 1922)    Body mass index is 31.02 kg/m². Physical Exam:   General: No acute distress, speaking in full sentences, no use of accessory muscles   HEENT: facial swelling resolved  Lungs: Coarse  Cardiovascular: Regular rate and rhythm with normal S1 and S2 , no edema   Abdomen: Soft, nontender, nondistended, normoactive bowel sounds   Extremities: No cyanosis clubbing or edema   Neuro: Nonfocal,  Psych: Normal affect     Data Review:  I have reviewed all labs, meds, and studies from the last 24 hours:    Labs:    No results found for this or any previous visit (from the past 24 hour(s)). All Micro Results     Procedure Component Value Units Date/Time    CULTURE, BLOOD [[id number]] Collected: 03/17/21 2244    Order Status: Completed Specimen: Blood Updated: 03/23/21 0751     Special Requests: --        RIGHT  Antecubital       Culture result: NO GROWTH 5 DAYS       SARS-COV-2, PCR [[id number]]  (Abnormal) Collected: 03/18/21 0110    Order Status: Completed Specimen: Nasopharyngeal Updated: 03/18/21 1048     Specimen source Nasopharyngeal        SARS-CoV-2 Detected        Comment:      The specimen is POSITIVE for SARS-CoV-2, the novel coronavirus associated with COVID-19. This test has been authorized by the FDA under an Emergency Use Authorization (EUA) for use by authorized laboratories.         Fact sheet for Healthcare Providers: ConventionUpdate.co.nz  Fact sheet for Patients: https://fda.gov/media/843018/download       Methodology: RT-PCR  RESULTS VERIFIED, PHONED TO AND READ BACK BY  CAMMIE INIGUEZ ON 3/18/21 @1047, TA         CULTURE, BLOOD [[id number]] Collected: 03/17/21 2245    Order Status: Canceled Specimen: Blood           EKG Results     Procedure 720 Value Units Date/Time    EKG, 12 LEAD, INITIAL [[id number]] Collected: 03/17/21 2244    Order Status: Completed Updated: 03/18/21 9651 Ventricular Rate 82 BPM      Atrial Rate 82 BPM      P-R Interval 150 ms      QRS Duration 70 ms      Q-T Interval 346 ms      QTC Calculation (Bezet) 404 ms      Calculated P Axis 36 degrees      Calculated R Axis 0 degrees      Calculated T Axis 59 degrees      Diagnosis --     !! AGE AND GENDER SPECIFIC ECG ANALYSIS !! Normal sinus rhythm  Normal ECG  No previous ECGs available  Confirmed by [facility] Edson  MD ()Rajan ([id number]) on 3/18/2021 6:45:38 AM            Other Studies:  Ct Chest Wo Cont    Result Date: 3/18/2021  EXAMINATION: CT CHEST WO CONT HISTORY: History of Covid, rule out ARDS. TECHNIQUE: Axial images of the chest were obtained without the administration of intravenous contrast. Coronal reformatted images were submitted. All CT scans are performed using dose optimization technique as appropriate to a performed exam including automated exposure control and/or standardized protocols for targeted exams where dose is matched to indication/reason for exam/patient size. COMPARISON: CT heart dated 2/16/2021. FINDINGS: The visualized thyroid gland is unremarkable. There are no enlarged mediastinal, hilar, or axillary lymph nodes. The heart is not enlarged and there is no pericardial effusion. The esophagus appears normal. The airways are patent. There is no consolidation, pleural effusion, or pneumothorax. No pulmonary nodules. Bilateral groundglass infiltrates are seen, slightly greater on the right. These have developed since the previous examination. Visualized upper upper abdomen is normal. No aggressive osseous lesions. The soft tissues are normal.     Interval development of bilateral peripherally oriented, groundglass lung infiltrates. These findings are consistent with an atypical pneumonia including Covid. The appearance of the lung parenchyma at this time does not suggest ARDS. Xr Chest Port    Result Date: 3/17/2021  EXAM: XR CHEST PORT HISTORY: Covid +/ Hypoxia.   TECHNIQUE: A single frontal view of the chest was submitted. COMPARISON: 3/13/2021 FINDINGS: The cardiac silhouette, mediastinum, and pulmonary vasculature are within normal limits. There is no consolidation, pleural effusion, or pneumothorax. Allowing for overlying structures, there may be subtle increased opacity in the periphery of the right lung. No significant osseous abnormalities are observed. Question interval development of mild infiltrate in the right lung peripherally. The overlying scapula and breast tissue are limiting evaluation of this area.           Current Meds:   Current Facility-Administered Medications   Medication Dose Route Frequency    albuterol (PROVENTIL VENTOLIN) nebulizer solution 2.5 mg  2.5 mg Nebulization Q4H PRN    loperamide (IMODIUM) capsule 4 mg  4 mg Oral Q6H PRN    sertraline (ZOLOFT) tablet 50 mg  50 mg Oral DAILY    LORazepam (ATIVAN) tablet 1 mg  1 mg Oral BID PRN    sodium chloride (OCEAN) 0.65 % nasal squeeze bottle 2 Spray  2 Spray Both Nostrils Q2H PRN    busPIRone (BUSPAR) tablet 15 mg  15 mg Oral BID PRN    budesonide-formoteroL (SYMBICORT) 160-4.5 mcg/actuation HFA inhaler 2 Puff  2 Puff Inhalation BID RT    tiotropium bromide (SPIRIVA RESPIMAT) 2.5 mcg /actuation  2 Puff Inhalation DAILY    dexAMETHasone (DECADRON) tablet 6 mg  6 mg Oral DAILY    levothyroxine (SYNTHROID) tablet 50 mcg  50 mcg Oral ACB    baclofen (LIORESAL) tablet 5 mg  5 mg Oral Q12H PRN    rosuvastatin (CRESTOR) tablet 20 mg  20 mg Oral QHS    sodium chloride (NS) flush 5-40 mL  5-40 mL IntraVENous Q8H    sodium chloride (NS) flush 5-40 mL  5-40 mL IntraVENous PRN    acetaminophen (TYLENOL) tablet 650 mg  650 mg Oral Q6H PRN    Or    acetaminophen (TYLENOL) suppository 650 mg  650 mg Rectal Q6H PRN    polyethylene glycol (MIRALAX) packet 17 g  17 g Oral DAILY    senna (SENOKOT) tablet 8.6 mg  1 Tab Oral DAILY PRN    bisacodyL (DULCOLAX) suppository 10 mg  10 mg Rectal DAILY PRN    ondansetron (ZOFRAN) injection 4 mg  4 mg IntraVENous Q6H PRN    famotidine (PEPCID) tablet 20 mg  20 mg Oral BID    enoxaparin (LOVENOX) injection 40 mg  40 mg SubCUTAneous Q12H    guaiFENesin-dextromethorphan (ROBITUSSIN DM) 100-10 mg/5 mL syrup 5 mL  5 mL Oral Q4H PRN    cholecalciferol (VITAMIN D3) (5000 Units/125 mcg) tablet 5,000 Units  5,000 Units Oral DAILY    0.9% sodium chloride infusion 250 mL  250 mL IntraVENous PRN       Problem List:  Hospital Problems as of 3/27/2021 Date Reviewed: 3/18/2021          Codes Class Noted - Resolved POA    * (Principal) Acute respiratory failure due to COVID-19 [facility]) ICD-10-CM: U07.1, J96.00  ICD-9-CM: 518.81, 079.89  3/18/2021 - Present Yes        Essential hypertension, benign ICD-10-CM: I10  ICD-9-CM: 401.1  12/16/2013 - Present Yes        Hypothyroidism, acquired, autoimmune ICD-10-CM: E06.3  ICD-9-CM: 244.8  12/16/2013 - Present Yes        Hyperlipidemia with target low density lipoprotein (LDL) cholesterol less than 130 mg/dL ICD-10-CM: E78.5  ICD-9-CM: 272.4  12/16/2013 - Present Yes        Vitamin D deficiency ICD-10-CM: E55.9  ICD-9-CM: 268.9  12/16/2013 - Present Yes               Part of this note was written by using a voice dictation software and the note has been proof read but may still contain some grammatical/other typographical errors.     Signed By: Neel Alfaro MD   [employer] [facility]    March 27, 2021  1:57 PM

## 2021-03-27 NOTE — PROGRESS NOTES
Ativan 1 mg given PO for sleep, per pt request. Agrees to call for asst to be out of bed. Voids without difficulty.

## 2021-03-27 NOTE — PROGRESS NOTES
Attempted to place pt on QHS BIPAP - pt does not want to wear at this time      03/27/21 0020   Oxygen Therapy   O2 Sat (%) 94 %   Pulse via Oximetry 61 beats per minute   O2 Device Hi flow nasal cannula   Skin Assessment Clean, dry, & intact   O2 Flow Rate (L/min) 6 l/min

## 2021-03-27 NOTE — PROGRESS NOTES
Resting quietly, awake, resp labored on exertion, improved at rest with O2 at 6L high flow N/C. Skin warm, dry. AP reg, lungs sounds diminished. Assessment completed. Aware to call for asst to be out of bed if steadiness questionable.

## 2021-03-27 NOTE — PROGRESS NOTES
Resting quietly, resp even, unlab with O2 intact. No distress noted during report given to Reina Sawant RN.

## 2021-03-28 VITALS
OXYGEN SATURATION: 94 % | BODY MASS INDEX: 31.21 KG/M2 | TEMPERATURE: 98.1 F | WEIGHT: 169.6 LBS | RESPIRATION RATE: 16 BRPM | HEART RATE: 80 BPM | SYSTOLIC BLOOD PRESSURE: 119 MMHG | HEIGHT: 62 IN | DIASTOLIC BLOOD PRESSURE: 73 MMHG

## 2021-03-28 PROCEDURE — 94640 AIRWAY INHALATION TREATMENT: CPT

## 2021-03-28 PROCEDURE — 94762 N-INVAS EAR/PLS OXIMTRY CONT: CPT

## 2021-03-28 PROCEDURE — 74011250636 HC RX REV CODE- 250/636: Performed by: INTERNAL MEDICINE

## 2021-03-28 PROCEDURE — 74011250637 HC RX REV CODE- 250/637: Performed by: HOSPITALIST

## 2021-03-28 PROCEDURE — 74011250636 HC RX REV CODE- 250/636: Performed by: HOSPITALIST

## 2021-03-28 PROCEDURE — 77010033678 HC OXYGEN DAILY

## 2021-03-28 PROCEDURE — 74011250637 HC RX REV CODE- 250/637: Performed by: INTERNAL MEDICINE

## 2021-03-28 RX ORDER — SERTRALINE HYDROCHLORIDE 50 MG/1
50 TABLET, FILM COATED ORAL DAILY
Qty: 30 TAB | Refills: 1 | Status: SHIPPED | OUTPATIENT
Start: 2021-03-29 | End: 2021-06-07

## 2021-03-28 RX ADMIN — DEXAMETHASONE 6 MG: 4 TABLET ORAL at 08:49

## 2021-03-28 RX ADMIN — SERTRALINE 50 MG: 50 TABLET, FILM COATED ORAL at 08:49

## 2021-03-28 RX ADMIN — FAMOTIDINE 20 MG: 20 TABLET, FILM COATED ORAL at 08:49

## 2021-03-28 RX ADMIN — CHOLECALCIFEROL TAB 125 MCG (5000 UNIT) 5000 UNITS: 125 TAB at 08:49

## 2021-03-28 RX ADMIN — Medication 10 ML: at 05:33

## 2021-03-28 RX ADMIN — LEVOTHYROXINE SODIUM 50 MCG: 0.05 TABLET ORAL at 07:59

## 2021-03-28 RX ADMIN — TIOTROPIUM BROMIDE INHALATION SPRAY 2 PUFF: 3.12 SPRAY, METERED RESPIRATORY (INHALATION) at 08:50

## 2021-03-28 RX ADMIN — BUDESONIDE AND FORMOTEROL FUMARATE DIHYDRATE 2 PUFF: 160; 4.5 AEROSOL RESPIRATORY (INHALATION) at 08:50

## 2021-03-28 RX ADMIN — POLYETHYLENE GLYCOL 3350 17 G: 17 POWDER, FOR SOLUTION ORAL at 08:48

## 2021-03-28 RX ADMIN — ENOXAPARIN SODIUM 40 MG: 40 INJECTION SUBCUTANEOUS at 08:50

## 2021-03-28 NOTE — PROGRESS NOTES
Pt declined to wear QHS BIPAP today      03/27/21 2004   Oxygen Therapy   O2 Sat (%) 95 %   Pulse via Oximetry 65 beats per minute   O2 Device Nasal cannula   Skin Assessment Clean, dry, & intact   O2 Flow Rate (L/min) 4 l/min

## 2021-03-28 NOTE — PROGRESS NOTES
Problem: Breathing Pattern - Ineffective  Goal: *Absence of hypoxia  Outcome: Progressing Towards Goal  Goal: *Use of effective breathing techniques  Outcome: Progressing Towards Goal  Goal: *PALLIATIVE CARE:  Alleviation of Dyspnea  Outcome: Progressing Towards Goal     Problem: Patient Education: Go to Patient Education Activity  Goal: Patient/Family Education  Outcome: Progressing Towards Goal     Problem: Risk for Spread of Infection  Goal: Prevent transmission of infectious organism to others  Description: Prevent the transmission of infectious organisms to other patients, staff members, and visitors. Outcome: Progressing Towards Goal     Problem: Patient Education:  Go to Education Activity  Goal: Patient/Family Education  Outcome: Progressing Towards Goal     Problem: Patient Education: Go to Patient Education Activity  Goal: Patient/Family Education  Outcome: Progressing Towards Goal     Problem: Patient Education: Go to Patient Education Activity  Goal: Patient/Family Education  Outcome: Progressing Towards Goal     Problem: Gas Exchange - Impaired  Goal: Absence of hypoxia  Outcome: Progressing Towards Goal  Goal: Promote optimal lung function  Outcome: Progressing Towards Goal     Problem: Risk for Fluid Volume Deficit  Goal: Maintain normal heart rhythm  Outcome: Progressing Towards Goal  Goal: Maintain absence of muscle cramping  Outcome: Progressing Towards Goal  Goal: Maintain normal serum potassium, sodium, calcium, phosphorus, and pH  Outcome: Progressing Towards Goal     Problem: Loneliness or Risk for Loneliness  Goal: Demonstrate positive use of time alone when socialization is not possible  Outcome: Progressing Towards Goal     Problem: Fatigue  Goal: Verbalize increase energy and improved vitality  Outcome: Progressing Towards Goal     Problem: Falls - Risk of  Goal: *Absence of Falls  Description: Document Nikia Fall Risk and appropriate interventions in the flowsheet.   Outcome: Progressing Towards Goal  Note: Fall Risk Interventions:            Medication Interventions: Patient to call before getting OOB, Teach patient to arise slowly                   Problem: Patient Education: Go to Patient Education Activity  Goal: Patient/Family Education  Outcome: Progressing Towards Goal     Problem: Nutrition Deficit  Goal: *Optimize nutritional status  Outcome: Progressing Towards Goal

## 2021-03-28 NOTE — PROGRESS NOTES
Problem: Breathing Pattern - Ineffective  Goal: *Absence of hypoxia  3/28/2021 1348 by Reynaldo Juan RN  Outcome: Resolved/Met  3/28/2021 1103 by Reynaldo Juan RN  Outcome: Progressing Towards Goal  Goal: *Use of effective breathing techniques  3/28/2021 1348 by Reynaldo Juan RN  Outcome: Resolved/Met  3/28/2021 1103 by Reynaldo Juan RN  Outcome: Progressing Towards Goal  Goal: *PALLIATIVE CARE:  Alleviation of Dyspnea  3/28/2021 1348 by Reynaldo Juan RN  Outcome: Resolved/Met  3/28/2021 1103 by Reynaldo Juan RN  Outcome: Progressing Towards Goal     Problem: Patient Education: Go to Patient Education Activity  Goal: Patient/Family Education  3/28/2021 1348 by Reynaldo Juan RN  Outcome: Resolved/Met  3/28/2021 1103 by Reynaldo Juan RN  Outcome: Progressing Towards Goal     Problem: Risk for Spread of Infection  Goal: Prevent transmission of infectious organism to others  Description: Prevent the transmission of infectious organisms to other patients, staff members, and visitors.   3/28/2021 1348 by Reynaldo Juan RN  Outcome: Resolved/Met  3/28/2021 1103 by Reynaldo Juan RN  Outcome: Progressing Towards Goal     Problem: Patient Education:  Go to Education Activity  Goal: Patient/Family Education  3/28/2021 1348 by Reynaldo Juan RN  Outcome: Resolved/Met  3/28/2021 1103 by Reynaldo Juan RN  Outcome: Progressing Towards Goal     Problem: Patient Education: Go to Patient Education Activity  Goal: Patient/Family Education  3/28/2021 1348 by Reynaldo Juan RN  Outcome: Resolved/Met  3/28/2021 1103 by Reynaldo Juan RN  Outcome: Progressing Towards Goal     Problem: Patient Education: Go to Patient Education Activity  Goal: Patient/Family Education  3/28/2021 1348 by Reynaldo Juan RN  Outcome: Resolved/Met  3/28/2021 1103 by Reynaldo Juan RN  Outcome: Progressing Towards Goal     Problem: Gas Exchange - Impaired  Goal: Absence of hypoxia  3/28/2021 1348 by Norma Centeno RN  Outcome: Resolved/Met  3/28/2021 1103 by Norma Centeno RN  Outcome: Progressing Towards Goal  Goal: Promote optimal lung function  3/28/2021 1348 by Norma Centeno RN  Outcome: Resolved/Met  3/28/2021 1103 by Norma Centeno RN  Outcome: Progressing Towards Goal     Problem: Risk for Fluid Volume Deficit  Goal: Maintain normal heart rhythm  3/28/2021 1348 by Norma Centeno RN  Outcome: Resolved/Met  3/28/2021 1103 by Norma Centeno RN  Outcome: Progressing Towards Goal  Goal: Maintain absence of muscle cramping  3/28/2021 1348 by Norma Centeno RN  Outcome: Resolved/Met  3/28/2021 1103 by Norma Centeno RN  Outcome: Progressing Towards Goal  Goal: Maintain normal serum potassium, sodium, calcium, phosphorus, and pH  3/28/2021 1348 by Norma Centeno RN  Outcome: Resolved/Met  3/28/2021 1103 by Norma Centeno RN  Outcome: Progressing Towards Goal     Problem: Loneliness or Risk for Loneliness  Goal: Demonstrate positive use of time alone when socialization is not possible  3/28/2021 1348 by Norma Centeno RN  Outcome: Resolved/Met  3/28/2021 1103 by Norma Centeno RN  Outcome: Progressing Towards Goal     Problem: Fatigue  Goal: Verbalize increase energy and improved vitality  3/28/2021 1348 by Norma Centeno RN  Outcome: Resolved/Met  3/28/2021 1103 by Norma Centeno RN  Outcome: Progressing Towards Goal     Problem: Falls - Risk of  Goal: *Absence of Falls  Description: Document Althea Wolfe Fall Risk and appropriate interventions in the flowsheet.   3/28/2021 1348 by Norma Centeno RN  Outcome: Resolved/Met  Note: Fall Risk Interventions:            Medication Interventions: Teach patient to arise slowly                3/28/2021 1103 by Norma Centeno RN  Outcome: Progressing Towards Goal  Note: Fall Risk Interventions:            Medication Interventions: Patient to call before getting OOB, Teach patient to arise slowly                   Problem: Patient Education: Go to Patient Education Activity  Goal: Patient/Family Education  3/28/2021 1348 by Jamila Darby RN  Outcome: Resolved/Met  3/28/2021 1103 by Jamila Darby RN  Outcome: Progressing Towards Goal     Problem: Nutrition Deficit  Goal: *Optimize nutritional status  3/28/2021 1348 by Jamila Darby RN  Outcome: Resolved/Met  3/28/2021 1103 by Jamila Darby RN  Outcome: Progressing Towards Goal

## 2021-03-28 NOTE — PROGRESS NOTES
Patient discharged at this time, transported by EMS transport service. All medication explained to patient, IV removed from left forearm and bandage applied. Covid treatment at home explained to patient. Any medication that needed to be picked up from pharmacy and all follow-up appointments explained as well. Patient had no concerns or question.

## 2021-03-28 NOTE — PROGRESS NOTES
CM made contact with patient via phone to discuss d/c plan and needs. Patient qualified for home 02 at 1 liter. Provider will be [facility].  CM made contact with Pierre Power at [facility] and informed him that all information has been faxed over. Patient has requested transport home by [facility]. Transport has been planned for 4pm . CM will continue to monitor and remain available for any needs or concerns that may occur. Care Management Interventions  PCP Verified by CM: Yes(Juan Rodriguez MD)  Mode of Transport at Discharge:  Other (see comment)(Family)  Transition of Care Consult (CM Consult): Discharge Planning  Physical Therapy Consult: Yes  Occupational Therapy Consult: Yes  Speech Therapy Consult: No  Current Support Network: Own Home, Lives Alone  Confirm Follow Up Transport: Family  The Patient and/or Patient Representative was Provided with a Choice of Provider and Agrees with the Discharge Plan?: Yes  Name of the Patient Representative Who was Provided with a Choice of Provider and Agrees with the Discharge Plan: Chuy Barclay  [place] of Choice List was Provided with Basic Dialogue that Supports the Patient's Individualized Plan of Care/Goals, Treatment Preferences and Shares the Quality Data Associated with the Providers?: Yes  [place] Resource Information Provided?: No  Discharge Location  Discharge Placement: Home

## 2021-03-28 NOTE — PROGRESS NOTES
Oxygen Qualifier       Room air: SpO2 with O2 and liter flow   Resting SpO2 94%     Ambulating SpO2  88% 90% on 1L     Patient required no O2 at rest, 1L of O2 while ambulating. No acute distress noted. RN notified. Completed by:     Beth Paz RT

## 2021-03-28 NOTE — DISCHARGE SUMMARY
[facility] Discharge Summary     Name:  Neha Childress    Age:60 y.o. Sex:female  :  1960       MRN:  [id number]       Admitting Physician: Guera Richardson DO Admit Date: 3/17/2021 10:20 PM   Attending Physician: Susana Mosher MD  Primary Care Physician: Alejandro Guzman MD       Discharge Physician: Linden Bain MD  Discharge date: 21   Discharged Condition: Stable    Indication for Admission:   Chief Complaint   Patient presents with    Positive For Covid-19        Reasons for hospitalization:  Hospital Problems as of 3/28/2021 Date Reviewed: 3/18/2021          Codes Class Noted - Resolved POA    * (Principal) Acute respiratory failure due to COVID-19 [facility]) ICD-10-CM: U07.1, J96.00  ICD-9-CM: 518.81, 079.89  3/18/2021 - Present Yes        Essential hypertension, benign ICD-10-CM: I10  ICD-9-CM: 401.1  2013 - Present Yes        Hypothyroidism, acquired, autoimmune ICD-10-CM: E06.3  ICD-9-CM: 244.8  2013 - Present Yes        Hyperlipidemia with target low density lipoprotein (LDL) cholesterol less than 130 mg/dL ICD-10-CM: E78.5  ICD-9-CM: 272.4  2013 - Present Yes        Vitamin D deficiency ICD-10-CM: E55.9  ICD-9-CM: 268.9  2013 - Present Yes                 Discharge Diagnosis: COVID PNA  Did Patient have Sepsis (YES OR NO): no      Hospital Course/Interval history:  Ms. Bill Lozada is a 63 yo female admitted with COVID 19, acute hypoxic respiratory failure. She has required AIRVO. She has been on course of antibiotics, decadron, remdesivir and s/p convalescent plasma. She has received intermittent lasix diuretics. CT chest without contrast shows GG opacities. Assessment/Plan:    Acute hypoxic respiratory failure due to COVID 19 pneumonia:  Completed course of abx, remdesivir and decadron  - on day of discharge, pt is feeling great and excited to be going home.  SaO2 good on RA at rest, requires 1L with exertion     HTN:  Resume lisinopril    Hypothyroidism:  Continue synthroid    Consults:   None     Disposition: Home Health Care [facility]  Diet:   DIET REGULAR  Code Status: Full Code      Follow up labs/imaging: none  Follow up with PCP in 1 week  Pending labs/studies: none    Current Discharge Medication List      START taking these medications    Details   sertraline (ZOLOFT) 50 mg tablet Take 1 Tab by mouth daily. Qty: 30 Tab, Refills: 1         CONTINUE these medications which have NOT CHANGED    Details   albuterol (PROVENTIL HFA, VENTOLIN HFA, PROAIR HFA) 90 mcg/actuation inhaler Take 2 Puffs by inhalation every four (4) hours as needed for Wheezing. Qty: 1 Inhaler, Refills: 0      rosuvastatin (CRESTOR) 20 mg tablet Take 1 Tab by mouth nightly. Qty: 90 Tab, Refills: 1      lisinopriL (PRINIVIL, ZESTRIL) 20 mg tablet Take 1 Tab by mouth daily. Qty: 90 Tab, Refills: 3    Associated Diagnoses: Essential hypertension, benign      meloxicam (MOBIC) 7.5 mg tablet Take 1 Tab by mouth two (2) times daily (with meals). Qty: 180 Tab, Refills: 3    Associated Diagnoses: Primary osteoarthritis involving multiple joints; Lumbar degenerative disc disease      levothyroxine (synthroid) 50 mcg tablet Take 1 Tab by mouth Daily (before breakfast). Qty: 90 Tab, Refills: 3    Associated Diagnoses: Hypothyroidism, acquired, autoimmune      ergocalciferol (ERGOCALCIFEROL) 1,250 mcg (50,000 unit) capsule Take 1 Cap by mouth every seven (7) days. Qty: 12 Cap, Refills: 3    Associated Diagnoses: Vitamin D deficiency      Omega-3 Fatty Acids (FISH OIL) 500 mg cap Take  by mouth. acetaminophen (TYLENOL ARTHRITIS PAIN) 650 mg TbER Take 650 mg by mouth every eight (8) hours as needed. aspirin delayed-release 81 mg tablet Take 81 mg by mouth daily. glucosamine-chondroitin (ARTHX) 500-400 mg cap Take 1 Cap by mouth daily. baclofen (LIORESAL) 10 mg tablet Take 1 Tab by mouth three (3) times daily as needed for Muscle Spasm(s).   Qty: 30 Tab, Refills: 0    Associated Diagnoses: Lumbar degenerative disc disease      OTHER Indications: TUMERIC             Medications Discontinued During This Encounter   Medication Reason   • ergocalciferol capsule 50,000 Units    • methylPREDNISolone (PF) (SOLU-MEDROL) injection 40 mg    • methylPREDNISolone (PF) (SOLU-MEDROL) injection 60 mg REORDER   • albuterol (PROVENTIL HFA, VENTOLIN HFA, PROAIR HFA) inhaler 2 Puff    • lactated Ringers infusion    • 0.9% sodium chloride infusion    • methylPREDNISolone (PF) (SOLU-MEDROL) injection 60 mg    • meloxicam (MOBIC) tablet 7.5 mg    • albuterol (PROVENTIL HFA, VENTOLIN HFA, PROAIR HFA) inhaler 2 Puff    • furosemide (LASIX) injection 20 mg    • albumin human 25% (BUMINATE) solution 12.5 g    • aspirin delayed-release tablet 81 mg    • furosemide (LASIX) tablet 20 mg    • lisinopriL (PRINIVIL, ZESTRIL) tablet 20 mg    • zinc sulfate (ZINCATE) 50 mg zinc (220 mg) capsule 220 mg    • ascorbic acid (vitamin C) (VITAMIN C) tablet 1,000 mg          Follow Up Orders:  Follow-up Appointments   Procedures   • FOLLOW UP VISIT Appointment in: One Week     Standing Status:   Standing     Number of Occurrences:   1     Order Specific Question:   Appointment in     Answer:   One Week         Follow-up Information     Follow up With Specialties Details Why Contact Info    Juan Guadalupe MD Internal Medicine   [address] [place] [address]  [phone]              Discharge Exam:    Patient Vitals for the past 24 hrs:   Temp Pulse Resp BP SpO2   03/28/21 1059 98.1 °F (36.7 °C) 80 16 119/73 94 %   03/28/21 0850 — — — — 94 %   03/28/21 0807 97.9 °F (36.6 °C) 66 16 114/74 96 %   03/28/21 0401 98.6 °F (37 °C) 67 16 124/76 96 %   03/27/21 2327 98.4 °F (36.9 °C) 66 18 124/79 95 %   03/27/21 2012 98.2 °F (36.8 °C) 78 18 119/72 94 %   03/27/21 2004 — — — — 95 %   03/27/21 1428 98.1 °F (36.7 °C) 74 17 102/67 96 %     Oxygen Therapy  O2 Sat (%): 94 % (03/28/21 1059)  Pulse via  Oximetry: 77 beats per minute (03/28/21 0850)  O2 Device: Nasal cannula (03/28/21 0850)  Skin Assessment: Clean, dry, & intact (03/28/21 0730)  Skin Protection for O2 Device: No (03/28/21 0730)  O2 Flow Rate (L/min): 2 l/min (03/28/21 0850)  O2 Temperature: 87.8 °F (31 °C) (03/24/21 0831)  FIO2 (%): 60 % (03/25/21 1922)    Estimated body mass index is 31.02 kg/m² as calculated from the following:    Height as of this encounter: 5' 2\" (1.575 m). Weight as of this encounter: 76.9 kg (169 lb 9.6 oz). Intake/Output Summary (Last 24 hours) at 3/28/2021 1246  Last data filed at 3/28/2021 0815  Gross per 24 hour   Intake 240 ml   Output 450 ml   Net -210 ml       *Note that automatically entered I/Os may not be accurate; dependent on patient compliance with collection and accurate  by assistants. Physical Exam:   General:  No acute distress, speaking in full sentences, no use of accessory muscles   HEENT:  Pupils equal and reactive to light and accommodation, oropharynx is clear   Neck:   Supple, no lymphadenopathy, no JVD   Lungs:  Clear to auscultation bilaterally   CV:  Regular rate and rhythm with normal S1 and S2   Abdomen: Soft, nontender, nondistended, normoactive bowel sounds   Extremities:  No cyanosis clubbing or edema   Neuro:  Nonfocal, A&O x3   Psych:  Normal affect       All Labs from Last 24 Hrs:  No results found for this or any previous visit (from the past 24 hour(s)).     All Micro Results     Procedure Component Value Units Date/Time    CULTURE, BLOOD [[id number]] Collected: 03/17/21 2248    Order Status: Completed Specimen: Blood Updated: 03/23/21 2009     Special Requests: --        RIGHT  Antecubital       Culture result: NO GROWTH 5 DAYS       SARS-COV-2, PCR [[id number]]  (Abnormal) Collected: 03/18/21 0110    Order Status: Completed Specimen: Nasopharyngeal Updated: 03/18/21 1048     Specimen source Nasopharyngeal        SARS-CoV-2 Detected        Comment:      The specimen is POSITIVE for SARS-CoV-2, the novel coronavirus associated with COVID-19. This test has been authorized by the FDA under an Emergency Use Authorization (EUA) for use by authorized laboratories. Fact sheet for Healthcare Providers: kstattoo.com  Fact sheet for Patients: https://fda.gov/media/014387/download       Methodology: RT-PCR  RESULTS VERIFIED, PHONED TO AND READ BACK BY  CAMMIE INIGUEZ ON 3/18/21 @1047, TA         CULTURE, BLOOD [[id number]] Collected: 03/17/21 0924    Order Status: Canceled Specimen: Blood           SARS-CoV-2 Lab Results  \"Novel Coronavirus\" Test: No results found for: COV2NT   \"Emergent Disease\" Test: No results found for: EDPR  \"SARS-COV-2\" Test: No results found for: XGCOVT  Rapid Test:   No results found for: COVR         Diagnostic Imaging/Tests:   Xr Chest Sngl V    Result Date: 3/13/2021  EXAM: Single view chest radiograph. INDICATION: Central line placement. COMPARISON: Chest radiograph dated March 13, 2021. FINDINGS: Left IJ center venous catheter tip terminating in the confluence of the left brachycephalic vein and SVC. Diffuse pulmonary interstitial and alveolar edema. Small bilateral layering pleural effusions. Unchanged mild cardiomegaly. 1. Left IJ central venous catheter tip terminating in the confluence of the left brachycephalic vein and SVC. No pneumothorax. 2. Diffuse pulmonary interstitial and alveolar edema with small bilateral pleural effusions and cardiomegaly. Xr Chest Port    Result Date: 3/14/2021  Chest X-ray INDICATION: Shortness of breath A portable AP view of the chest was obtained. FINDINGS: There are persistent but improving infiltrates in both lung bases. The heart size is normal.  Support tubing is in good position. Improving bilateral infiltrates     Xr Chest Port    Result Date: 3/13/2021  EXAM: XR CHEST PORT HISTORY: SOB. TECHNIQUE: A single frontal view of the chest was submitted.  COMPARISON: None available. FINDINGS: The patient is rotated to the right. There is a shallow inspiration. The cardiac silhouette, mediastinum, and pulmonary vasculature are within normal limits. Bilateral lung infiltrates are observed. There may be silhouetting of the left hemidiaphragm secondary to the infiltrates. There is no consolidation, convincing pleural effusion, or pneumothorax. No significant osseous abnormalities are observed. Findings as described above. Pneumonia (bacterial or viral) should be considered. Please correlate with Covid status. Echocardiogram/EKG results:  No results found for this visit on 03/17/21. EKG Results     Procedure 720 Value Units Date/Time    EKG, 12 LEAD, INITIAL [[id number]] Collected: 03/17/21 2244    Order Status: Completed Updated: 03/18/21 0645     Ventricular Rate 82 BPM      Atrial Rate 82 BPM      P-R Interval 150 ms      QRS Duration 70 ms      Q-T Interval 346 ms      QTC Calculation (Bezet) 404 ms      Calculated P Axis 36 degrees      Calculated R Axis 0 degrees      Calculated T Axis 59 degrees      Diagnosis --     !! AGE AND GENDER SPECIFIC ECG ANALYSIS !! Normal sinus rhythm  Normal ECG  No previous ECGs available  Confirmed by [facility]  MD ()Thang ([id number]) on 3/18/2021 6:45:38 AM            Results for orders placed or performed during the hospital encounter of 03/17/21   EKG, 12 LEAD, INITIAL   Result Value Ref Range    Ventricular Rate 82 BPM    Atrial Rate 82 BPM    P-R Interval 150 ms    QRS Duration 70 ms    Q-T Interval 346 ms    QTC Calculation (Bezet) 404 ms    Calculated P Axis 36 degrees    Calculated R Axis 0 degrees    Calculated T Axis 59 degrees    Diagnosis       !! AGE AND GENDER SPECIFIC ECG ANALYSIS !!   Normal sinus rhythm  Normal ECG  No previous ECGs available  Confirmed by [facility] King[facility]  MD ()Thang ([id number]) on 3/18/2021 6:45:38 AM         Procedures done this admission:  * No surgery found *        Time spent in patient discharge planning and coordination 28 minutes.       Signed By: Aurora Pierre MD   [facility] Hospitalist Service    March 28, 2021  5:07 PM

## 2021-04-28 ENCOUNTER — HOSPITAL ENCOUNTER (OUTPATIENT)
Dept: GENERAL RADIOLOGY | Age: 61
Discharge: HOME | End: 2021-04-28

## 2021-04-28 DIAGNOSIS — J12.82 PNEUMONIA DUE TO COVID-19 VIRUS: ICD-10-CM

## 2021-04-28 DIAGNOSIS — U07.1 PNEUMONIA DUE TO COVID-19 VIRUS: ICD-10-CM

## 2021-04-30 NOTE — PROGRESS NOTES
Pt notified that her chest x-ray looks completely normal!  This is great news! Pt verbalized understanding.

## 2022-03-19 PROBLEM — J96.00 ACUTE RESPIRATORY FAILURE DUE TO COVID-19 (HCC): Status: ACTIVE | Noted: 2021-03-18

## 2022-03-19 PROBLEM — U07.1 ACUTE RESPIRATORY FAILURE DUE TO COVID-19 (HCC): Status: ACTIVE | Noted: 2021-03-18

## 2022-04-29 ENCOUNTER — HOSPITAL ENCOUNTER (OUTPATIENT)
Dept: MAMMOGRAPHY | Age: 62
Discharge: HOME | End: 2022-04-29
Attending: PHYSICIAN ASSISTANT
Payer: COMMERCIAL

## 2022-04-29 DIAGNOSIS — M85.89 OSTEOPENIA OF MULTIPLE SITES: ICD-10-CM

## 2022-04-29 DIAGNOSIS — Z12.31 ENCOUNTER FOR SCREENING MAMMOGRAM FOR BREAST CANCER: ICD-10-CM

## 2022-04-29 PROCEDURE — 77080 DXA BONE DENSITY AXIAL: CPT

## 2022-04-29 PROCEDURE — 77063 BREAST TOMOSYNTHESIS BI: CPT

## 2022-05-02 NOTE — PROGRESS NOTES
Bone density shows slight improvements in some areas and other areas are stable - no worsening. Continue calcium w/ d3 supplementation and any exercising that she's been doing as it seems to be working.

## 2022-05-02 NOTE — PROGRESS NOTES
Pt notified that her bone density shows slight improvements in some areas and other areas are stable - no worsening. Continue calcium w/ d3 supplementation and any exercising that she's been doing as it seems to be working. Pt verbalized understanding.

## 2022-06-13 ENCOUNTER — TELEPHONE (OUTPATIENT)
Dept: INTERNAL MEDICINE CLINIC | Facility: CLINIC | Age: 62
End: 2022-06-13

## 2022-06-13 DIAGNOSIS — J96.00 ACUTE RESPIRATORY FAILURE DUE TO COVID-19 (HCC): Primary | ICD-10-CM

## 2022-06-13 DIAGNOSIS — I10 ESSENTIAL HYPERTENSION, BENIGN: ICD-10-CM

## 2022-06-13 DIAGNOSIS — R53.81 CHRONIC FATIGUE AND MALAISE: ICD-10-CM

## 2022-06-13 DIAGNOSIS — R53.82 CHRONIC FATIGUE AND MALAISE: ICD-10-CM

## 2022-06-13 DIAGNOSIS — U07.1 ACUTE RESPIRATORY FAILURE DUE TO COVID-19 (HCC): Primary | ICD-10-CM

## 2022-06-13 DIAGNOSIS — G47.34 NOCTURNAL HYPOXIA: ICD-10-CM

## 2022-06-20 ENCOUNTER — TELEPHONE (OUTPATIENT)
Dept: INTERNAL MEDICINE CLINIC | Facility: CLINIC | Age: 62
End: 2022-06-20

## 2022-06-20 NOTE — TELEPHONE ENCOUNTER
Pt LVM on 6/20/22 asking for me to return her call because she had questions about her overnight oximetry results. LVM on 6/20/22 @ 4:31 PM to have pt return my call with any questions she has.

## 2022-06-23 ENCOUNTER — TELEMEDICINE (OUTPATIENT)
Dept: INTERNAL MEDICINE CLINIC | Facility: CLINIC | Age: 62
End: 2022-06-23
Payer: COMMERCIAL

## 2022-06-23 ENCOUNTER — TELEPHONE (OUTPATIENT)
Dept: INTERNAL MEDICINE CLINIC | Facility: CLINIC | Age: 62
End: 2022-06-23

## 2022-06-23 DIAGNOSIS — U07.1 COVID-19: Primary | ICD-10-CM

## 2022-06-23 PROCEDURE — 99213 OFFICE O/P EST LOW 20 MIN: CPT | Performed by: PHYSICIAN ASSISTANT

## 2022-06-23 ASSESSMENT — ENCOUNTER SYMPTOMS
EYE DISCHARGE: 0
SINUS PAIN: 0
DIARRHEA: 0
COUGH: 1
SHORTNESS OF BREATH: 0
WHEEZING: 0
SORE THROAT: 1
RHINORRHEA: 1

## 2022-06-23 NOTE — PROGRESS NOTES
Sebastian Cordova (:  1960) is a Established patient, here for evaluation of the following: Positive for COVID-19. Assessment & Plan   Below is the assessment and plan developed based on review of pertinent history, physical exam, labs, studies, and medications. 1. COVID-19  -     nirmatrelvir/ritonavir (PAXLOVID) 20 x 150 MG & 10 x 100MG; Take 3 tablets (two 150 mg nirmatrelvir and one 100 mg ritonavir tablets) by mouth every 12 hours for 5 days. , Disp-30 tablet, R-0Normal      Patient Instructions   Discussed options to include watchful waiting with reduced risk of serious illness now that she's vaccinated vs Paxlovid which patient is inquiring about and desires to take  Encouraged to stay well hydrated with plenty of water  Monitor temperature regularly and treat w/ Tylenol if it exceeds 101  Keep an eye on O2 levels - should remain > 90%  Continue chronic medications as prescribed  Hold Crestor x 10 days while taking Paxlovid and recovering from [facility]      Return if symptoms worsen or fail to improve. Subjective   Generalized Body Aches  This is a new problem. Episode onset: 2 days ago. Progression since onset: somewhat improved today. Associated symptoms include chills, congestion, coughing (dry), fatigue, headaches (mild), myalgias and a sore throat (mild). Pertinent negatives include no chest pain, diaphoresis or fever. Nothing aggravates the symptoms. She has tried acetaminophen and rest for the symptoms. The treatment provided mild relief. She took a home COVID test yesterday after sudden onset of symptoms in the morning which was positive. She then went to [address] [place] [address] and tested positive there as well. She is vaccinated x 2 doses ( & 21) after being hospitalized x 11 days due to acute respiratory failure due to COVID in 2021. Review of Systems   Constitutional: Positive for chills and fatigue. Negative for diaphoresis and fever.    HENT: Positive for congestion, ear pain (intermittent R), rhinorrhea (clear sinus drainage), sneezing (occasional) and sore throat (mild). Negative for postnasal drip and sinus pain. Eyes: Negative for discharge. Respiratory: Positive for cough (dry). Negative for shortness of breath and wheezing. Cardiovascular: Negative for chest pain. Gastrointestinal: Negative for diarrhea. Musculoskeletal: Positive for myalgias. Neurological: Positive for headaches (mild). Negative for dizziness. Objective   Patient-Reported Vitals  Patient-Reported Temperature: 98.0  Patient-Reported Pulse Oximetry: 96       Physical Exam  Constitutional:       Appearance: Normal appearance. HENT:      Head: Normocephalic and atraumatic. Neurological:      Mental Status: She is alert and oriented to person, place, and time. Psychiatric:         Mood and Affect: Mood normal.         Behavior: Behavior normal.         Thought Content: Thought content normal.         Judgment: Judgment normal.              Ludmila Lucero was evaluated through a synchronous (real-time) audio-video encounter. The patient (or guardian if applicable) is aware that this is a billable service, which includes applicable co-pays. This Virtual Visit was conducted with patient's (and/or legal guardian's) consent. The visit was conducted pursuant to the emergency declaration under the [facility] waiver authority and the [employer] General Act. Patient identification was verified, and a caregiver was present when appropriate. The patient was located at Home: [address] [place] [address] [address] [facility]. Provider was located at [facility] (Appt Dept): [facility] 4 [facility],  [address]         --Kelvin Wood PA-C

## 2022-06-23 NOTE — TELEPHONE ENCOUNTER
Pt states that she tested pos for COVID again yesterday and she wanted to know if you would put her on Paxlovid. She states that the provider she saw yesterday told her she could not take it because she is on a cholesterol medication, but she wants to know if she can come off of her cholesterol med to take the Paxlovid if it would help her. Do you want to add her as a virtual visit at the end of the day? Please advise.

## 2022-06-23 NOTE — PATIENT INSTRUCTIONS
Discussed options to include watchful waiting with reduced risk of serious illness now that she's vaccinated vs Paxlovid which patient is inquiring about and desires to take  Encouraged to stay well hydrated with plenty of water  Monitor temperature regularly and treat w/ Tylenol if it exceeds 101  Keep an eye on O2 levels - should remain > 90%  Continue chronic medications as prescribed  Hold Crestor x 10 days while taking Paxlovid and recovering from [place]

## 2022-06-23 NOTE — TELEPHONE ENCOUNTER
Yes, please add her to the end of the day today. I will also need her records from wherever she was tested and evaluated initially.

## 2022-07-27 DIAGNOSIS — R73.02 IMPAIRED GLUCOSE TOLERANCE: Primary | ICD-10-CM

## 2022-07-27 DIAGNOSIS — I10 ESSENTIAL HYPERTENSION, BENIGN: ICD-10-CM

## 2022-07-27 DIAGNOSIS — R74.01 ELEVATED ALT MEASUREMENT: ICD-10-CM

## 2022-07-29 ENCOUNTER — NURSE ONLY (OUTPATIENT)
Dept: INTERNAL MEDICINE CLINIC | Facility: CLINIC | Age: 62
End: 2022-07-29

## 2022-07-29 DIAGNOSIS — I10 ESSENTIAL HYPERTENSION, BENIGN: ICD-10-CM

## 2022-07-29 DIAGNOSIS — R73.02 IMPAIRED GLUCOSE TOLERANCE: ICD-10-CM

## 2022-07-29 DIAGNOSIS — R74.01 ELEVATED ALT MEASUREMENT: ICD-10-CM

## 2022-07-29 LAB
ALBUMIN SERPL-MCNC: 4.3 G/DL (ref 3.2–4.6)
ALBUMIN/GLOB SERPL: 1.4 {RATIO} (ref 1.2–3.5)
ALP SERPL-CCNC: 103 U/L (ref 50–136)
ALT SERPL-CCNC: 38 U/L (ref 12–65)
ANION GAP SERPL CALC-SCNC: 5 MMOL/L (ref 7–16)
AST SERPL-CCNC: 24 U/L (ref 15–37)
BILIRUB SERPL-MCNC: 0.4 MG/DL (ref 0.2–1.1)
BUN SERPL-MCNC: 15 MG/DL (ref 8–23)
CALCIUM SERPL-MCNC: 9.4 MG/DL (ref 8.3–10.4)
CHLORIDE SERPL-SCNC: 107 MMOL/L (ref 98–107)
CO2 SERPL-SCNC: 31 MMOL/L (ref 21–32)
CREAT SERPL-MCNC: 0.6 MG/DL (ref 0.6–1)
GLOBULIN SER CALC-MCNC: 3 G/DL (ref 2.3–3.5)
GLUCOSE SERPL-MCNC: 81 MG/DL (ref 65–100)
POTASSIUM SERPL-SCNC: 4.2 MMOL/L (ref 3.5–5.1)
PROT SERPL-MCNC: 7.3 G/DL (ref 6.3–8.2)
SODIUM SERPL-SCNC: 143 MMOL/L (ref 136–145)

## 2022-07-30 LAB
EST. AVERAGE GLUCOSE BLD GHB EST-MCNC: 123 MG/DL
HBA1C MFR BLD: 5.9 % (ref 4.8–5.6)

## 2022-08-04 ENCOUNTER — OFFICE VISIT (OUTPATIENT)
Dept: INTERNAL MEDICINE CLINIC | Facility: CLINIC | Age: 62
End: 2022-08-04
Payer: COMMERCIAL

## 2022-08-04 VITALS
WEIGHT: 183 LBS | HEIGHT: 62 IN | DIASTOLIC BLOOD PRESSURE: 80 MMHG | BODY MASS INDEX: 33.68 KG/M2 | SYSTOLIC BLOOD PRESSURE: 135 MMHG | OXYGEN SATURATION: 97 % | HEART RATE: 79 BPM | TEMPERATURE: 97.7 F

## 2022-08-04 DIAGNOSIS — R74.01 ELEVATED ALT MEASUREMENT: ICD-10-CM

## 2022-08-04 DIAGNOSIS — R73.02 IMPAIRED GLUCOSE TOLERANCE: Primary | ICD-10-CM

## 2022-08-04 DIAGNOSIS — I10 ESSENTIAL HYPERTENSION, BENIGN: ICD-10-CM

## 2022-08-04 DIAGNOSIS — E06.3 HYPOTHYROIDISM, ACQUIRED, AUTOIMMUNE: ICD-10-CM

## 2022-08-04 DIAGNOSIS — U07.1 COVID-19: ICD-10-CM

## 2022-08-04 DIAGNOSIS — E78.5 HYPERLIPIDEMIA WITH TARGET LOW DENSITY LIPOPROTEIN (LDL) CHOLESTEROL LESS THAN 130 MG/DL: ICD-10-CM

## 2022-08-04 DIAGNOSIS — Z23 NEED FOR SHINGLES VACCINE: ICD-10-CM

## 2022-08-04 DIAGNOSIS — E55.9 VITAMIN D DEFICIENCY: ICD-10-CM

## 2022-08-04 PROCEDURE — 99213 OFFICE O/P EST LOW 20 MIN: CPT | Performed by: PHYSICIAN ASSISTANT

## 2022-08-04 RX ORDER — CHLORAL HYDRATE 500 MG
CAPSULE ORAL
COMMUNITY

## 2022-08-04 RX ORDER — ZOSTER VACCINE RECOMBINANT, ADJUVANTED 50 MCG/0.5
0.5 KIT INTRAMUSCULAR SEE ADMIN INSTRUCTIONS
Qty: 0.5 ML | Refills: 1 | Status: SHIPPED | OUTPATIENT
Start: 2022-08-04 | End: 2023-01-31

## 2022-08-04 ASSESSMENT — PATIENT HEALTH QUESTIONNAIRE - PHQ9
1. LITTLE INTEREST OR PLEASURE IN DOING THINGS: 0
SUM OF ALL RESPONSES TO PHQ QUESTIONS 1-9: 0
SUM OF ALL RESPONSES TO PHQ9 QUESTIONS 1 & 2: 0
2. FEELING DOWN, DEPRESSED OR HOPELESS: 0

## 2022-08-04 ASSESSMENT — ENCOUNTER SYMPTOMS
CONSTIPATION: 0
DIARRHEA: 0
COUGH: 1
SHORTNESS OF BREATH: 0
WHEEZING: 0

## 2022-08-04 NOTE — PATIENT INSTRUCTIONS
Praised her success in lowering glycemic values with dietary modifications made thus far  Strongly encouraged to resume a regular exercise routine and strive to find a way to have indoor exercise options for times when weather is not optimal  Continue chronic medications as prescribed  Recommend COVID booster especially if they come out with a new version that includes more recent strains of the virus  Advised that she can proceed at any time with shingles vaccine since it's been > 6 months since shingles outbreak - may want to contact her insurance company to check on pricing prior to scheduling at the pharmacy

## 2022-08-04 NOTE — PROGRESS NOTES
Chavez Galindo (:  1960) is a 65 y.o. female,Established patient, here for evaluation of the following chief complaint(s):  Follow-up (Elevated Glucose, COVID)         ASSESSMENT/PLAN:  1. Impaired glucose tolerance  -     Comprehensive Metabolic Panel; Future  -     Hemoglobin A1C; Future  2. Need for shingles vaccine  -     zoster recombinant adjuvanted vaccine [facility]) 50 MCG/0.5ML SUSR injection; Inject 0.5 mLs into the muscle See Admin Instructions 1 dose now and repeat in 2-6 months, Disp-0.5 mL, R-1Print  3. COVID-19      Patient Instructions   Praised her success in lowering glycemic values with dietary modifications made thus far  Strongly encouraged to resume a regular exercise routine and strive to find a way to have indoor exercise options for times when weather is not optimal  Continue chronic medications as prescribed  Recommend COVID booster especially if they come out with a new version that includes more recent strains of the virus  Advised that she can proceed at any time with shingles vaccine since it's been > 6 months since shingles outbreak - may want to contact her insurance company to check on pricing prior to scheduling at the pharmacy      Return in about 3 months (around 2022) for routine physical.         Subjective   SUBJECTIVE/OBJECTIVE:  Patient is here for follow-up of impaired glucose tolerance. The current state of this condition is asymptomatic and improved - not currently on medications for this problem. She has made dietary changes to include less bread, limited soda, and reduced volume of alcohol. She has not been exercising lately due to the extreme heat. Lab Results   Component Value Date    LABA1C 5.9 (H) 2022     Lab Results   Component Value Date     2022       Patient is here for follow-up of COVID.   The current state of this condition is improved and no significant medication side effects noted on Paxlovid - completed as prescribed ~ 5 weeks ago. She described no progression of her symptoms and no rebound symptoms experienced. She reports being leary of COVID booster due to last dose \"giving her shingles\". Review of Systems   Constitutional:  Negative for fatigue and unexpected weight change. Eyes:  Negative for visual disturbance. Respiratory:  Positive for cough (occasional - dry). Negative for shortness of breath and wheezing. Cardiovascular:  Negative for chest pain and palpitations. Gastrointestinal:  Negative for constipation and diarrhea. Endocrine: Negative for polydipsia. Genitourinary:  Negative for frequency. Musculoskeletal:  Positive for arthralgias (bottom of R foot). Negative for myalgias. Neurological:  Negative for dizziness, numbness and headaches. /80 (Site: Left Upper Arm, Position: Sitting, Cuff Size: Large Adult)   Pulse 79   Temp 97.7 °F (36.5 °C) (Temporal)   Ht 5' 2\" (1.575 m)   Wt 183 lb (83 kg)   SpO2 97%   BMI 33.47 kg/m²       Objective   Physical Exam  Constitutional:       Appearance: Normal appearance. HENT:      Head: Normocephalic and atraumatic. Eyes:      Conjunctiva/sclera: Conjunctivae normal.      Pupils: Pupils are equal, round, and reactive to light. Neck:      Vascular: No carotid bruit. Cardiovascular:      Rate and Rhythm: Normal rate and regular rhythm. Heart sounds: Normal heart sounds. Pulmonary:      Effort: Pulmonary effort is normal.      Breath sounds: Normal breath sounds. Musculoskeletal:         General: Normal range of motion. Cervical back: Normal range of motion. Right lower leg: No edema. Left lower leg: No edema. Skin:     General: Skin is warm and dry. Neurological:      Mental Status: She is alert and oriented to person, place, and time. Psychiatric:         Mood and Affect: Mood normal.         Behavior: Behavior normal.         Thought Content:  Thought content normal.         Judgment: Judgment normal.          On this date 8/4/2022 I have spent 20 minutes reviewing previous notes, test results and face to face with the patient discussing the diagnosis and importance of compliance with the treatment plan as well as documenting on the day of the visit. An electronic signature was used to authenticate this note.     --Cody Briceno PA-C

## 2022-08-15 ENCOUNTER — HOSPITAL ENCOUNTER (OUTPATIENT)
Dept: SLEEP CENTER | Age: 62
Discharge: HOME | End: 2022-08-18
Payer: COMMERCIAL

## 2022-08-15 PROCEDURE — 95806 SLEEP STUDY UNATT&RESP EFFT: CPT

## 2022-09-15 ENCOUNTER — HOSPITAL ENCOUNTER (OUTPATIENT)
Dept: SLEEP MEDICINE | Age: 62
Discharge: HOME | End: 2022-09-18
Payer: COMMERCIAL

## 2022-09-15 PROCEDURE — 95811 POLYSOM 6/>YRS CPAP 4/> PARM: CPT

## 2022-09-19 ENCOUNTER — TELEPHONE (OUTPATIENT)
Dept: SLEEP MEDICINE | Age: 62
End: 2022-09-19

## 2022-09-27 ENCOUNTER — OFFICE VISIT (OUTPATIENT)
Dept: SLEEP MEDICINE | Age: 62
End: 2022-09-27
Payer: COMMERCIAL

## 2022-09-27 VITALS
TEMPERATURE: 97.7 F | HEART RATE: 77 BPM | DIASTOLIC BLOOD PRESSURE: 84 MMHG | SYSTOLIC BLOOD PRESSURE: 138 MMHG | RESPIRATION RATE: 14 BRPM | BODY MASS INDEX: 34.12 KG/M2 | OXYGEN SATURATION: 97 % | WEIGHT: 185.4 LBS | HEIGHT: 62 IN

## 2022-09-27 DIAGNOSIS — R06.83 SNORING: ICD-10-CM

## 2022-09-27 DIAGNOSIS — E66.9 OBESITY (BMI 30.0-34.9): ICD-10-CM

## 2022-09-27 DIAGNOSIS — G47.10 HYPERSOMNIA: ICD-10-CM

## 2022-09-27 DIAGNOSIS — G47.33 OSA (OBSTRUCTIVE SLEEP APNEA): Primary | ICD-10-CM

## 2022-09-27 DIAGNOSIS — G47.34 NOCTURNAL HYPOXEMIA: ICD-10-CM

## 2022-09-27 PROCEDURE — 99203 OFFICE O/P NEW LOW 30 MIN: CPT | Performed by: NURSE PRACTITIONER

## 2022-09-27 ASSESSMENT — SLEEP AND FATIGUE QUESTIONNAIRES
HOW LIKELY ARE YOU TO NOD OFF OR FALL ASLEEP WHILE SITTING QUIETLY AFTER LUNCH WITHOUT ALCOHOL: 0
HOW LIKELY ARE YOU TO NOD OFF OR FALL ASLEEP WHILE SITTING INACTIVE IN A PUBLIC PLACE: 1
HOW LIKELY ARE YOU TO NOD OFF OR FALL ASLEEP WHILE WATCHING TV: 0
HOW LIKELY ARE YOU TO NOD OFF OR FALL ASLEEP WHILE LYING DOWN TO REST IN THE AFTERNOON WHEN CIRCUMSTANCES PERMIT: 3
HOW LIKELY ARE YOU TO NOD OFF OR FALL ASLEEP WHILE SITTING AND READING: 3
HOW LIKELY ARE YOU TO NOD OFF OR FALL ASLEEP WHEN YOU ARE A PASSENGER IN A CAR FOR AN HOUR WITHOUT A BREAK: 3
HOW LIKELY ARE YOU TO NOD OFF OR FALL ASLEEP IN A CAR, WHILE STOPPED FOR A FEW MINUTES IN TRAFFIC: 0
HOW LIKELY ARE YOU TO NOD OFF OR FALL ASLEEP WHILE SITTING AND TALKING TO SOMEONE: 0
ESS TOTAL SCORE: 10

## 2022-09-27 NOTE — PATIENT INSTRUCTIONS
Start CPAP therapy 10 cm H2O with nightly compliance  New CPAP and supplies ordered  Recommendations as above  Appropriate handouts were given to patient including information on sleep apnea, sleep hygiene and PAP therapy.   [facility] up with the [facility] will be 4 months or sooner if needed

## 2022-10-24 ENCOUNTER — TELEPHONE (OUTPATIENT)
Dept: INTERNAL MEDICINE CLINIC | Facility: CLINIC | Age: 62
End: 2022-10-24

## 2022-10-24 NOTE — TELEPHONE ENCOUNTER
----- Message from Raz Mueller sent at 10/24/2022  2:24 PM EDT -----  Subject: Message to Provider    QUESTIONS  Information for Provider? PT wants to know if she needs to come in a week   before her 12/5 appt for bloodwork  ---------------------------------------------------------------------------  --------------  [id number] [employer]  [phone]; OK to leave message on voicemail  ---------------------------------------------------------------------------  --------------  SCRIPT ANSWERS  Relationship to Patient?  Self

## 2022-10-29 DIAGNOSIS — E78.5 HYPERLIPIDEMIA, UNSPECIFIED: ICD-10-CM

## 2022-10-29 DIAGNOSIS — R93.1 ABNORMAL FINDINGS ON DIAGNOSTIC IMAGING OF HEART AND CORONARY CIRCULATION: ICD-10-CM

## 2022-10-31 RX ORDER — ROSUVASTATIN CALCIUM 20 MG/1
TABLET, COATED ORAL
Qty: 90 TABLET | Refills: 1 | OUTPATIENT
Start: 2022-10-31

## 2022-12-01 ENCOUNTER — TELEPHONE (OUTPATIENT)
Dept: INTERNAL MEDICINE CLINIC | Facility: CLINIC | Age: 62
End: 2022-12-01

## 2022-12-01 NOTE — TELEPHONE ENCOUNTER
----- Message from Nadira Daigle sent at 12/1/2022  3:15 PM EST -----  Subject: Message to Provider    QUESTIONS  Information for Provider? pt was returning a call to the office was   requesting to speak with [facility] instead to assist her.   ---------------------------------------------------------------------------  --------------  [id number] [employer] [employer]  [phone]; OK to leave message on voicemail  ---------------------------------------------------------------------------  --------------  SCRIPT ANSWERS  Relationship to Patient?  Self

## 2022-12-16 DIAGNOSIS — E06.3 HYPOTHYROIDISM, ACQUIRED, AUTOIMMUNE: ICD-10-CM

## 2022-12-16 DIAGNOSIS — E78.5 HYPERLIPIDEMIA WITH TARGET LOW DENSITY LIPOPROTEIN (LDL) CHOLESTEROL LESS THAN 130 MG/DL: ICD-10-CM

## 2022-12-16 DIAGNOSIS — R73.02 IMPAIRED GLUCOSE TOLERANCE: ICD-10-CM

## 2022-12-16 DIAGNOSIS — I10 ESSENTIAL HYPERTENSION, BENIGN: ICD-10-CM

## 2022-12-16 DIAGNOSIS — E55.9 VITAMIN D DEFICIENCY: ICD-10-CM

## 2022-12-16 LAB
BASOPHILS # BLD: 0 K/UL (ref 0–0.2)
BASOPHILS NFR BLD: 0 % (ref 0–2)
DIFFERENTIAL METHOD BLD: NORMAL
EOSINOPHIL # BLD: 0.1 K/UL (ref 0–0.8)
EOSINOPHIL NFR BLD: 1 % (ref 0.5–7.8)
ERYTHROCYTE [DISTWIDTH] IN BLOOD BY AUTOMATED COUNT: 13.2 % (ref 11.9–14.6)
HCT VFR BLD AUTO: 43.4 % (ref 35.8–46.3)
HGB BLD-MCNC: 13.7 G/DL (ref 11.7–15.4)
IMM GRANULOCYTES # BLD AUTO: 0 K/UL (ref 0–0.5)
IMM GRANULOCYTES NFR BLD AUTO: 0 % (ref 0–5)
LYMPHOCYTES # BLD: 1.3 K/UL (ref 0.5–4.6)
LYMPHOCYTES NFR BLD: 16 % (ref 13–44)
MCH RBC QN AUTO: 29.3 PG (ref 26.1–32.9)
MCHC RBC AUTO-ENTMCNC: 31.6 G/DL (ref 31.4–35)
MCV RBC AUTO: 92.9 FL (ref 82–102)
MONOCYTES # BLD: 0.8 K/UL (ref 0.1–1.3)
MONOCYTES NFR BLD: 10 % (ref 4–12)
NEUTS SEG # BLD: 5.6 K/UL (ref 1.7–8.2)
NEUTS SEG NFR BLD: 73 % (ref 43–78)
NRBC # BLD: 0 K/UL (ref 0–0.2)
PLATELET # BLD AUTO: 249 K/UL (ref 150–450)
PMV BLD AUTO: 9.8 FL (ref 9.4–12.3)
RBC # BLD AUTO: 4.67 M/UL (ref 4.05–5.2)
WBC # BLD AUTO: 7.8 K/UL (ref 4.3–11.1)

## 2022-12-17 LAB
25(OH)D3 SERPL-MCNC: 82 NG/ML (ref 30–100)
ALBUMIN SERPL-MCNC: 4.3 G/DL (ref 3.2–4.6)
ALBUMIN/GLOB SERPL: 1.4 (ref 0.4–1.6)
ALP SERPL-CCNC: 113 U/L (ref 50–136)
ALT SERPL-CCNC: 33 U/L (ref 12–65)
ANION GAP SERPL CALC-SCNC: 4 MMOL/L (ref 2–11)
AST SERPL-CCNC: 15 U/L (ref 15–37)
BILIRUB SERPL-MCNC: 0.4 MG/DL (ref 0.2–1.1)
BUN SERPL-MCNC: 13 MG/DL (ref 8–23)
CALCIUM SERPL-MCNC: 9.6 MG/DL (ref 8.3–10.4)
CHLORIDE SERPL-SCNC: 107 MMOL/L (ref 101–110)
CHOLEST SERPL-MCNC: 137 MG/DL
CO2 SERPL-SCNC: 30 MMOL/L (ref 21–32)
CREAT SERPL-MCNC: 0.6 MG/DL (ref 0.6–1)
EST. AVERAGE GLUCOSE BLD GHB EST-MCNC: 123 MG/DL
GLOBULIN SER CALC-MCNC: 3.1 G/DL (ref 2.8–4.5)
GLUCOSE SERPL-MCNC: 98 MG/DL (ref 65–100)
HBA1C MFR BLD: 5.9 % (ref 4.8–5.6)
HDLC SERPL-MCNC: 56 MG/DL (ref 40–60)
HDLC SERPL: 2.4
LDLC SERPL CALC-MCNC: 66.8 MG/DL
POTASSIUM SERPL-SCNC: 4.4 MMOL/L (ref 3.5–5.1)
PROT SERPL-MCNC: 7.4 G/DL (ref 6.3–8.2)
SODIUM SERPL-SCNC: 141 MMOL/L (ref 133–143)
TRIGL SERPL-MCNC: 71 MG/DL (ref 35–150)
TSH, 3RD GENERATION: 1.46 UIU/ML (ref 0.36–3.74)
VLDLC SERPL CALC-MCNC: 14.2 MG/DL (ref 6–23)

## 2022-12-22 ENCOUNTER — OFFICE VISIT (OUTPATIENT)
Dept: INTERNAL MEDICINE CLINIC | Facility: CLINIC | Age: 62
End: 2022-12-22

## 2022-12-22 VITALS
DIASTOLIC BLOOD PRESSURE: 90 MMHG | HEART RATE: 72 BPM | TEMPERATURE: 97.7 F | WEIGHT: 181 LBS | SYSTOLIC BLOOD PRESSURE: 152 MMHG | OXYGEN SATURATION: 97 % | BODY MASS INDEX: 33.31 KG/M2 | HEIGHT: 62 IN

## 2022-12-22 DIAGNOSIS — M15.9 PRIMARY OSTEOARTHRITIS INVOLVING MULTIPLE JOINTS: ICD-10-CM

## 2022-12-22 DIAGNOSIS — Z00.00 ROUTINE GENERAL MEDICAL EXAMINATION AT A HEALTH CARE FACILITY: Primary | ICD-10-CM

## 2022-12-22 DIAGNOSIS — M51.36 LUMBAR DEGENERATIVE DISC DISEASE: ICD-10-CM

## 2022-12-22 DIAGNOSIS — R93.1 AGATSTON CORONARY ARTERY CALCIUM SCORE BETWEEN 200 AND 399: ICD-10-CM

## 2022-12-22 DIAGNOSIS — G47.33 OBSTRUCTIVE SLEEP APNEA ON CPAP: ICD-10-CM

## 2022-12-22 DIAGNOSIS — R73.02 IMPAIRED GLUCOSE TOLERANCE: ICD-10-CM

## 2022-12-22 DIAGNOSIS — E78.5 HYPERLIPIDEMIA LDL GOAL <100: ICD-10-CM

## 2022-12-22 DIAGNOSIS — R05.8 UPPER AIRWAY COUGH SYNDROME: ICD-10-CM

## 2022-12-22 DIAGNOSIS — E55.9 VITAMIN D DEFICIENCY: ICD-10-CM

## 2022-12-22 DIAGNOSIS — Z23 NEED FOR SHINGLES VACCINE: ICD-10-CM

## 2022-12-22 DIAGNOSIS — Z99.89 OBSTRUCTIVE SLEEP APNEA ON CPAP: ICD-10-CM

## 2022-12-22 DIAGNOSIS — E06.3 HYPOTHYROIDISM, ACQUIRED, AUTOIMMUNE: ICD-10-CM

## 2022-12-22 DIAGNOSIS — Z23 NEED FOR IMMUNIZATION AGAINST INFLUENZA: ICD-10-CM

## 2022-12-22 DIAGNOSIS — I10 ESSENTIAL HYPERTENSION, BENIGN: ICD-10-CM

## 2022-12-22 DIAGNOSIS — Z12.31 SCREENING MAMMOGRAM FOR BREAST CANCER: ICD-10-CM

## 2022-12-22 RX ORDER — LISINOPRIL 40 MG/1
40 TABLET ORAL DAILY
Qty: 90 TABLET | Refills: 3 | Status: SHIPPED | OUTPATIENT
Start: 2022-12-22

## 2022-12-22 RX ORDER — ZOSTER VACCINE RECOMBINANT, ADJUVANTED 50 MCG/0.5
0.5 KIT INTRAMUSCULAR SEE ADMIN INSTRUCTIONS
Qty: 0.5 ML | Refills: 1 | Status: SHIPPED | OUTPATIENT
Start: 2022-12-22 | End: 2023-06-20

## 2022-12-22 RX ORDER — ROSUVASTATIN CALCIUM 20 MG/1
20 TABLET, COATED ORAL
Qty: 90 TABLET | Refills: 3 | Status: SHIPPED | OUTPATIENT
Start: 2022-12-22

## 2022-12-22 RX ORDER — LISINOPRIL 20 MG/1
20 TABLET ORAL DAILY
Qty: 90 TABLET | Refills: 3 | Status: SHIPPED | OUTPATIENT
Start: 2022-12-22 | End: 2022-12-22 | Stop reason: SDUPTHER

## 2022-12-22 RX ORDER — LEVOTHYROXINE SODIUM 0.05 MG/1
50 TABLET ORAL
Qty: 90 TABLET | Refills: 3 | Status: SHIPPED | OUTPATIENT
Start: 2022-12-22

## 2022-12-22 RX ORDER — MELOXICAM 7.5 MG/1
7.5 TABLET ORAL 2 TIMES DAILY WITH MEALS
Qty: 180 TABLET | Refills: 3 | Status: SHIPPED | OUTPATIENT
Start: 2022-12-22

## 2022-12-22 RX ORDER — PROMETHAZINE HYDROCHLORIDE 25 MG/1
TABLET ORAL
COMMUNITY
Start: 2022-11-08

## 2022-12-22 ASSESSMENT — ENCOUNTER SYMPTOMS
ABDOMINAL PAIN: 0
RHINORRHEA: 0
BACK PAIN: 0
VOMITING: 0
DIARRHEA: 0
CONSTIPATION: 0
EYE REDNESS: 0
COUGH: 1
EYE ITCHING: 0
PHOTOPHOBIA: 0
NAUSEA: 0
EYE PAIN: 0
BLOOD IN STOOL: 0
EYE DISCHARGE: 1
VOICE CHANGE: 0
SORE THROAT: 0
WHEEZING: 0
SHORTNESS OF BREATH: 0

## 2022-12-22 ASSESSMENT — PATIENT HEALTH QUESTIONNAIRE - PHQ9
1. LITTLE INTEREST OR PLEASURE IN DOING THINGS: 0
2. FEELING DOWN, DEPRESSED OR HOPELESS: 0
SUM OF ALL RESPONSES TO PHQ9 QUESTIONS 1 & 2: 0
SUM OF ALL RESPONSES TO PHQ QUESTIONS 1-9: 0

## 2022-12-22 NOTE — PROGRESS NOTES
Theresa Green (:  1960) is a 65 y.o. female,Established patient, here for evaluation of the following chief complaint(s): Annual Exam         ASSESSMENT/PLAN:  1. Routine general medical examination at a health care facility  -     EKG 12 Lead; Future  -     Influenza, FLUCELVAX, (age 10 mo+), IM, Preservative Free, 0.5 mL  2. Need for immunization against influenza  -     Influenza, FLUCELVAX, (age 10 mo+), IM, Preservative Free, 0.5 mL  3. Need for shingles vaccine  -     zoster recombinant adjuvanted vaccine [facility]) 50 MCG/0.5ML SUSR injection; Inject 0.5 mLs into the muscle See Admin Instructions 1 dose now and repeat in 2-6 months, Disp-0.5 mL, R-1Print  4. Screening mammogram for breast cancer  -     [facility] [place] DIGITAL SCREEN BILATERAL; Future  5. Hyperlipidemia LDL goal <100  -     rosuvastatin (CRESTOR) 20 MG tablet; Take 1 tablet by mouth nightly, Disp-90 tablet, R-3Normal  -     Comprehensive Metabolic Panel; Future  -     Lipid Panel; Future  6. Essential hypertension, benign  -     lisinopril (PRINIVIL;ZESTRIL) 40 MG tablet; Take 1 tablet by mouth daily, Disp-90 tablet, R-3Normal  -     Comprehensive Metabolic Panel; Future  7. Agatston coronary artery calcium score between 200 and 399  -     rosuvastatin (CRESTOR) 20 MG tablet; Take 1 tablet by mouth nightly, Disp-90 tablet, R-3Normal  -     Lipid Panel; Future  8. Impaired glucose tolerance  -     Comprehensive Metabolic Panel; Future  -     Hemoglobin A1C; Future  9. Hypothyroidism, acquired, autoimmune  -     levothyroxine (SYNTHROID) 50 MCG tablet; Take 1 tablet by mouth every morning (before breakfast), Disp-90 tablet, R-3Normal  -     TSH; Future  10. Vitamin D deficiency  -     Vitamin D 25 Hydroxy; Future  11. Primary osteoarthritis involving multiple joints  -     meloxicam (MOBIC) 7.5 MG tablet; Take 1 tablet by mouth 2 times daily (with meals), Disp-180 tablet, R-3Normal  12.  Lumbar degenerative disc disease  -     meloxicam (MOBIC) 7.5 MG tablet; Take 1 tablet by mouth 2 times daily (with meals), Disp-180 tablet, R-3Normal  13. Upper airway cough syndrome  14. Obstructive sleep apnea on CPAP      Patient Instructions   Suggest using OTC nasal steroid - Flonase or Nasacort 2 sprays/nostril once daily in the evening more often to relieve remaining nasal congestion & postnasal drip causing cough  Hold Ergocalciferol for now  Increase Lisinopril to 40 mg daily (double up on remaining supply of 20 mg tablets until used up then fill new prescription for higher dose sent to pharmacy today)  Continue chronic medications as prescribed  Encouraged to consider shingles vaccine (Shingrix) even if previously vaccinated with Zostavax (original live shingles vaccine). Advised that healthy adults 50 years and older should receive 2 doses of Shingrix -  by 2-6 months. Suggest checking with insurance to obtain coverage information. A prescription will be printed and recommended to be purchased and administered at any local pharmacy  Monitor blood pressure 3 times/week and keep record to bring to next appointment  Recommend resuming a regular exercise routine  Reminded to stay well hydrated with plenty of water  Maintain monthly self breast exams  Recommend getting newest COVID vaccine given formulation that should better protect against the most recently circulating Omicron variant        Return in 4 months (on 4/22/2023) for routine f/u. Subjective   SUBJECTIVE/OBJECTIVE:  HPI  The patient is a 65 y.o. female who is seen for a comprehensive physical exam.  Health behaviors: sedentary, nonsmoker, not following any particular diet . Date of last physical exam: Sept 2021. The patient is menopausal and is not status post hysterectomy. Current gynecologic symptoms include: none. I have reviewed the patient's medical history in detail and updated the computerized patient record.      Previous Preventative Testing:  Mammogram: April 2022 (results: normal); Pap Smear: June 2021 (results: normal); Bone Density: April 2022 (results: abnormal - osteopenia); Colonoscopy: Aug 2017 (results: normal); EKG: Mar 2021 (results: normal); Chest X-Ray (if smoker): April 2021 (results: normal); Hemoccult: Never       Immunizations: up to date and documented  Immunization History   Administered Date(s) Administered    COVID-19, PFIZER PURPLE top, DILUTE for use, (age 15 y+), 30mcg/0.3mL 07/16/2021, 08/06/2021    INFLUENZA, INTRADERMAL, QUADRIVALENT, PRESERVATIVE FREE 12/10/2015    Influenza Trivalent 11/21/2013    Influenza, FLUAD, (age 72 y+), Adjuvanted, 0.5mL 09/30/2021    Influenza, FLUARIX, FLULAVAL, FLUZONE (age 10 mo+) AND AFLURIA, (age 1 y+), PF, 0.5mL 12/29/2016, 10/19/2018, 12/13/2018, 11/01/2019, 10/22/2020    Influenza, FLUCELVAX, (age 10 mo+), MDCK, PF, 0.5mL 12/22/2022    Pneumococcal conjugate PCV20, PF (Prevnar 20) 05/05/2022    Td vaccine (adult) 08/29/2008    Tdap (Boostrix, Adacel) 07/19/2018         Specific concerns today: See below    The patient is a 65 y.o. female who is seen for evaluation of hyperlipidemia. She was tested because of hyperlipidemia w/ LDL goal < 100 + elevated calcium score. The current state of this condition is no significant medication side effects noted and well controlled on Crestor 20 mg q hs + OTC Fish Oil supplement daily  - taking as prescribed.        Last Lipid Panel:   Lab Results   Component Value Date    CHOL 137 12/16/2022    CHOL 145 04/29/2022    CHOL 143 01/03/2022     Lab Results   Component Value Date    TRIG 71 12/16/2022    TRIG 72 04/29/2022    TRIG 85 01/03/2022     Lab Results   Component Value Date    HDL 56 12/16/2022    HDL 58 04/29/2022    HDL 51 01/03/2022     Lab Results   Component Value Date    LDLCALC 66.8 12/16/2022    LDLCALC 73 04/29/2022    LDLCALC 76 01/03/2022     Lab Results   Component Value Date    LABVLDL 14.2 12/16/2022    LABVLDL 22 07/24/2020    LABVLDL 18 01/31/2020 VLDL 14 04/29/2022    VLDL 16 01/03/2022    VLDL 16 09/17/2021     Lab Results   Component Value Date    CHOLHDLRATIO 2.4 12/16/2022       Patient is here for follow-up of impaired glucose tolerance. The current state of this condition is asymptomatic and stable - not currently on medications for this problem. She has increased soda (3-4 times/week) & bread intake somewhat, but has kept alcohol to a minimum. She has not been exercising. Hemoglobin A1C   Date Value Ref Range Status   12/16/2022 5.9 (H) 4.8 - 5.6 % Final       The patient is a 65 y.o. female who is seen for follow up of hypothyroidism. Current symptoms: weight changes. Symptoms are basically asymptomatic. The patient is following treatment regimen with good compliance. Current treatment regimen consists of Levothyroxine 50 mcg daily and is  taking it first thing in the AM on an empty stomach with no other food/liquids/other medications for at least 45-60 minutes. Lab Results   Component Value Date    EFI5DPK 1.460 12/16/2022    TSH 2.480 04/29/2022    TSH 2.680 01/03/2022    TSH 1.300 09/17/2021     No results found for: T4FREE  No results found for: TGAB      Patient is here for follow-up of vitamin d deficiency. The current state of this condition is no significant medication side effects noted and well controlled on Ergocalciferol 50,000 iu weekly (Monday) + OTC vitamin d3 supplement daily + OTC calcium w/ d3 bid - not taking 2nd dose of calcium as prescribed, medication compliance problems: forgets. Vit D, 25-Hydroxy   Date Value Ref Range Status   12/16/2022 82.0 30.0 - 100.0 ng/mL Final   04/29/2022 59.8 30.0 - 100.0 ng/mL Final     Comment:     Vitamin D deficiency has been defined by the [address] practice guideline as a  level of serum 25-OH vitamin D less than 20 ng/mL (1,2).   The Endocrine Society went on to further define vitamin D  insufficiency as a level between 21 and 29 ng/mL (2).  1. IOM ([place] [employer]). 2010. Dietary reference     intakes for calcium and D. [facility]: The     [employer]. 2. Janina Perla, Genoveva WAN et al.     Evaluation, treatment, and prevention of vitamin D     deficiency: an Endocrine Society clinical practice     guideline. JCEM. 2011 Jul; 96(7):1911-30.     01/03/2022 59.5 30.0 - 100.0 ng/mL Final     Comment:     Vitamin D deficiency has been defined by the [address] practice guideline as a  level of serum 25-OH vitamin D less than 20 ng/mL (1,2). The Endocrine Society went on to further define vitamin D  insufficiency as a level between 21 and 29 ng/mL (2). 1. IOM ([place] [employer]). 2010. Dietary reference     intakes for calcium and D. [facility]: The     [employer]. 2. Janina Perla, Genoveva WAN, et al.     Evaluation, treatment, and prevention of vitamin D     deficiency: an Endocrine Society clinical practice     guideline. JCEM. 2011 Jul; 96(7):1911-30. The patient is a 65 y.o. female who is seen for follow-up of hypertension. She is not exercising and is not adherent to low salt diet. Daily caffiene intake: a known amount (none). Current medication regimen consists of: Lisinopril 20 mg daily. Blood pressure is borderline controlled at home, ranging 140's/90's. BP Readings from Last 3 Encounters:   12/22/22 (!) 140/90   09/27/22 138/84   08/04/22 135/80       Patient is here for follow-up of lumbar DDD + osteoarthritis. The current state of this condition is no significant medication side effects noted and reasonably well controlled on Meloxicam 7.5 mg bid + Tylenol Arthritis prn (uses sparingly) - not taking 2nd dose of Meloxicam every night as prescribed, medication compliance problems: tries to reserve for when pain is more severe in the evenings. Patient is here for follow-up of obstructive sleep apnea. The current state of this condition is no significant medication side effects noted and reasonably well controlled on CPAP - using as prescribed, medication compliance problems: hasn't used in ~ 3 weeks due to nasal congestion. She needs a letter authorizing the [employer] company to accept returned oxygen tanks if she doesn't warrant use of them any longer. With recent confirmation of sleep apnea and compliance with CPAP she should no longer require nocturnal oxygen therapy.       Past Medical History:   Diagnosis Date    Acute medial meniscal tear 12/28/2018    Left    Agatston coronary artery calcium score between 200 and 399 02/26/2021    Calcium Score 272    Aortic regurgitation 09/08/2021    Moderate per ECHO    COVID-19 03/11/2021 & 6/22/22    ARDS    Hashimoto's disease     Hemorrhoids 08/28/2017    Internal + External per Colonoscopy    Hyperlipidemia     Hypertension     Hypothyroidism, acquired, autoimmune     Lumbar degenerative disc disease 09/20/2019    Mild Degeneration & Moderate Disc Bulges L3-S1 with Moderate Bilateral L>R Foramen Narrowings    Migraine headache     Obstructive sleep apnea on CPAP 09/15/2022    Osteoarthritis     R Toe, L Hand, Bilateral Knees    Osteopenia 4/23/2015    Shingles 08/26/2021    Trigger ring finger     Right Middle    Vitamin B12 deficiency 12/16/2013    Vitamin D deficiency 12/16/2013         Past Surgical History:   Procedure Laterality Date    APPENDECTOMY      COLONOSCOPY  08/28/2017    Due 2027    KNEE ARTHROSCOPY Right 2009    OVARIAN CYST REMOVAL Right     TONSILLECTOMY           Social History     Socioeconomic History    Marital status: Single     Spouse name: None    Number of children: None    Years of education: None    Highest education level: None   Tobacco Use    Smoking status: Never    Smokeless tobacco: Never   Vaping Use    Vaping Use: Never used   Substance and Sexual Activity    Alcohol use: Not Currently     Alcohol/week: 2.0 standard drinks     Types: 2 Glasses of wine per week    Drug use: Never         Family History   Problem Relation Age of Onset    Cancer Mother         Lung    Hypertension Mother     Diabetes Mother     Heart Disease Father     Hypertension Father     Hypertension Brother     Depression Brother     Alcohol Abuse Brother     Breast Cancer Neg Hx          Current Outpatient Medications   Medication Sig Dispense Refill    promethazine (PHENERGAN) 25 MG tablet TAKE 1 TABLET BY MOUTH EVERY 6 HOURS AS NEEDED FOR NAUSEA/VOMITING, CONGESTION, SLEEP      Omega-3 Fatty Acids (FISH OIL) 1000 MG CAPS Take by mouth      aspirin 81 MG EC tablet Take 81 mg by mouth daily      ergocalciferol (ERGOCALCIFEROL) 1.25 MG (69870 UT) capsule Take 50,000 Units by mouth every 7 days      glucosamine-chondroitin 500-400 MG CAPS Take 1 capsule by mouth daily      levothyroxine (SYNTHROID) 50 MCG tablet Take 50 mcg by mouth every morning (before breakfast)      lisinopril (PRINIVIL;ZESTRIL) 20 MG tablet Take 20 mg by mouth daily      meloxicam (MOBIC) 7.5 MG tablet Take 7.5 mg by mouth 2 times daily (with meals)      rosuvastatin (CRESTOR) 20 MG tablet Take 20 mg by mouth      zoster recombinant adjuvanted vaccine [facility]) 50 MCG/0.5ML SUSR injection Inject 0.5 mLs into the muscle See Admin Instructions 1 dose now and repeat in 2-6 months (Patient not taking: Reported on 12/22/2022) 0.5 mL 1    acetaminophen (TYLENOL) 650 MG extended release tablet Take 650 mg by mouth every 8 hours as needed (Patient not taking: No sig reported)      albuterol sulfate  (90 Base) MCG/ACT inhaler Inhale 2 puffs into the lungs every 4 hours as needed (Patient not taking: No sig reported)      baclofen (LIORESAL) 10 MG tablet Take 10 mg by mouth 3 times daily as needed (Patient not taking: No sig reported)      clobetasol (TEMOVATE) 0.05 % ointment Apply topically 2 times daily (Patient not taking: No sig reported)      Crisaborole (EUCRISA) 2 % OINT Apply thin area to affected areas bid (Patient not taking: No sig reported)       No current facility-administered medications for this visit. Review of Systems   Constitutional:  Negative for chills, diaphoresis, fatigue, fever and unexpected weight change. HENT:  Positive for congestion. Negative for ear pain, hearing loss, rhinorrhea, sore throat, tinnitus and voice change. Eyes:  Positive for discharge. Negative for photophobia, pain, redness, itching and visual disturbance. Respiratory:  Positive for cough (residual from recent illness). Negative for shortness of breath and wheezing. Negative for snoring   Cardiovascular:  Negative for chest pain and leg swelling. Gastrointestinal:  Negative for abdominal pain, blood in stool, constipation, diarrhea, nausea and vomiting. Negative for heartburn   Endocrine: Negative for cold intolerance, heat intolerance and polydipsia. Negative for hair loss   Genitourinary:  Negative for dysuria, flank pain, frequency, hematuria and urgency. Musculoskeletal:  Positive for arthralgias (knees). Negative for back pain, myalgias and neck pain. Skin:  Negative for rash. Allergic/Immunologic: Negative for environmental allergies. Neurological:  Negative for dizziness, tremors, weakness, numbness and headaches. Hematological:  Does not bruise/bleed easily. Psychiatric/Behavioral:  Negative for dysphoric mood and sleep disturbance. The patient is not nervous/anxious. BP (!) 140/90 (Site: Left Upper Arm, Position: Sitting, Cuff Size: Large Adult)   Pulse 72   Temp 97.7 °F (36.5 °C) (Temporal)   Ht 5' 2\" (1.575 m)   Wt 181 lb (82.1 kg)   SpO2 97%   BMI 33.11 kg/m²       BP (!) 152/90   Pulse 72   Temp 97.7 °F (36.5 °C) (Temporal)   Ht 5' 2\" (1.575 m)   Wt 181 lb (82.1 kg)   SpO2 97%   BMI 33.11 kg/m²       Objective   Physical Exam  Constitutional:       Appearance: Normal appearance. HENT:      Head: Normocephalic and atraumatic.       Right Ear: Tympanic membrane, ear canal and external ear normal.      Left Ear: Tympanic membrane, ear canal and external ear normal.      Nose:      Right Turbinates: Swollen. Left Turbinates: Not swollen. Right Sinus: No maxillary sinus tenderness or frontal sinus tenderness. Left Sinus: No maxillary sinus tenderness or frontal sinus tenderness. Mouth/Throat:      Mouth: Mucous membranes are moist.      Pharynx: Oropharynx is clear. Eyes:      Extraocular Movements: Extraocular movements intact. Conjunctiva/sclera: Conjunctivae normal.      Pupils: Pupils are equal, round, and reactive to light. Neck:      Thyroid: No thyromegaly. Vascular: No carotid bruit. Cardiovascular:      Rate and Rhythm: Normal rate and regular rhythm. Pulses: Normal pulses. Heart sounds: Normal heart sounds. Pulmonary:      Effort: Pulmonary effort is normal.      Breath sounds: Normal breath sounds. Chest:   Breasts:     Right: No mass. Left: No mass. Abdominal:      General: Bowel sounds are normal.      Palpations: Abdomen is soft. Tenderness: There is no abdominal tenderness. Musculoskeletal:         General: Normal range of motion. Cervical back: Normal range of motion. Right lower leg: No edema. Left lower leg: No edema. Lymphadenopathy:      Head:      Right side of head: No submandibular or tonsillar adenopathy. Left side of head: No submandibular or tonsillar adenopathy. Cervical: No cervical adenopathy. Upper Body:      Right upper body: No axillary adenopathy. Left upper body: No axillary adenopathy. Skin:     General: Skin is warm and dry. Neurological:      Mental Status: She is alert and oriented to person, place, and time. Deep Tendon Reflexes:      Reflex Scores:       Bicep reflexes are 2+ on the right side and 2+ on the left side. Patellar reflexes are 2+ on the right side and 2+ on the left side.   Psychiatric: Mood and Affect: Mood normal.         Behavior: Behavior normal.         Thought Content: Thought content normal.         Judgment: Judgment normal.          EKG Interpretation:  Rhythm: normal sinus rhythm and regular . Rate (approx.): 77; Axis: normal; P wave: normal; QRS interval: normal ; ST/T wave: normal; Other findings: unchanged from previous ekg on 3/18/21. An electronic signature was used to authenticate this note.     --Kasia Carbajal PA-C

## 2022-12-22 NOTE — Clinical Note
Please call and get her moved up to 4 month follow-up. She is currently scheduled for 6 months but with BP not being optimal that is too long. Thanks! She needs blood work done prior but not sure if she's wanting to just walk in or if we need to get it scheduled - don't see anything on her record.

## 2023-01-26 ENCOUNTER — TELEPHONE (OUTPATIENT)
Dept: SLEEP MEDICINE | Age: 63
End: 2023-01-26

## 2023-01-26 NOTE — TELEPHONE ENCOUNTER
Called pt to ask her to bring her machine. She states she has not used machine in the last 30 days due to having an upper respiratory infection. She Wanted to know if  she should keep the appt. . Told her to hold on . When I returned back to the line she had hung up. I attempted to call again it went to voicemail. Voicemail is full I could not leave a message.

## 2023-03-30 ENCOUNTER — TELEPHONE (OUTPATIENT)
Dept: SLEEP MEDICINE | Age: 63
End: 2023-03-30

## 2023-03-30 NOTE — TELEPHONE ENCOUNTER
I called patient to remind patient to bring PAP machine to office visit. N/A, unable to leave voicemail.  VM is full

## 2023-05-05 ENCOUNTER — HOSPITAL ENCOUNTER (OUTPATIENT)
Dept: MAMMOGRAPHY | Age: 63
Discharge: HOME | End: 2023-05-05
Payer: COMMERCIAL

## 2023-05-05 DIAGNOSIS — Z12.31 SCREENING MAMMOGRAM FOR BREAST CANCER: ICD-10-CM

## 2023-05-05 PROCEDURE — 77063 BREAST TOMOSYNTHESIS BI: CPT

## 2023-06-22 ENCOUNTER — TELEPHONE (OUTPATIENT)
Dept: INTERNAL MEDICINE CLINIC | Facility: CLINIC | Age: 63
End: 2023-06-22

## 2023-06-22 ENCOUNTER — OFFICE VISIT (OUTPATIENT)
Dept: INTERNAL MEDICINE CLINIC | Facility: CLINIC | Age: 63
End: 2023-06-22
Payer: COMMERCIAL

## 2023-06-22 VITALS
SYSTOLIC BLOOD PRESSURE: 150 MMHG | WEIGHT: 185 LBS | OXYGEN SATURATION: 98 % | DIASTOLIC BLOOD PRESSURE: 96 MMHG | HEART RATE: 80 BPM | TEMPERATURE: 97.2 F | HEIGHT: 62 IN | BODY MASS INDEX: 34.04 KG/M2

## 2023-06-22 DIAGNOSIS — I10 ELEVATED BLOOD PRESSURE READING IN OFFICE WITH DIAGNOSIS OF HYPERTENSION: Primary | ICD-10-CM

## 2023-06-22 DIAGNOSIS — R20.2 PARESTHESIA OF UPPER EXTREMITY: ICD-10-CM

## 2023-06-22 DIAGNOSIS — E78.5 HYPERLIPIDEMIA LDL GOAL <100: ICD-10-CM

## 2023-06-22 DIAGNOSIS — R73.02 IMPAIRED GLUCOSE TOLERANCE: ICD-10-CM

## 2023-06-22 DIAGNOSIS — Z23 NEED FOR SHINGLES VACCINE: ICD-10-CM

## 2023-06-22 DIAGNOSIS — E06.3 HYPOTHYROIDISM, ACQUIRED, AUTOIMMUNE: ICD-10-CM

## 2023-06-22 DIAGNOSIS — E55.9 VITAMIN D DEFICIENCY: ICD-10-CM

## 2023-06-22 DIAGNOSIS — M79.621 PAIN IN RIGHT UPPER ARM: ICD-10-CM

## 2023-06-22 DIAGNOSIS — R93.1 AGATSTON CORONARY ARTERY CALCIUM SCORE BETWEEN 200 AND 399: ICD-10-CM

## 2023-06-22 PROCEDURE — 3077F SYST BP >= 140 MM HG: CPT | Performed by: PHYSICIAN ASSISTANT

## 2023-06-22 PROCEDURE — 99214 OFFICE O/P EST MOD 30 MIN: CPT | Performed by: PHYSICIAN ASSISTANT

## 2023-06-22 PROCEDURE — 3080F DIAST BP >= 90 MM HG: CPT | Performed by: PHYSICIAN ASSISTANT

## 2023-06-22 RX ORDER — ZOSTER VACCINE RECOMBINANT, ADJUVANTED 50 MCG/0.5
0.5 KIT INTRAMUSCULAR SEE ADMIN INSTRUCTIONS
Qty: 0.5 ML | Refills: 1 | Status: SHIPPED | OUTPATIENT
Start: 2023-06-22 | End: 2023-12-19

## 2023-06-22 ASSESSMENT — PATIENT HEALTH QUESTIONNAIRE - PHQ9
2. FEELING DOWN, DEPRESSED OR HOPELESS: 0
1. LITTLE INTEREST OR PLEASURE IN DOING THINGS: 0
SUM OF ALL RESPONSES TO PHQ QUESTIONS 1-9: 0
SUM OF ALL RESPONSES TO PHQ9 QUESTIONS 1 & 2: 0
SUM OF ALL RESPONSES TO PHQ QUESTIONS 1-9: 0

## 2023-06-22 ASSESSMENT — ENCOUNTER SYMPTOMS
SHORTNESS OF BREATH: 0
DIARRHEA: 0
CONSTIPATION: 0

## 2023-06-22 NOTE — PROGRESS NOTES
Delicia Sheffield (:  1960) is a 59 y.o. female,Established patient, here for evaluation of the following chief complaint(s):  Follow-up (Hypothyroidism, Hyperlipidemia, Hypertension, Elevated Glucose, Vit D Def) and Arm Pain         ASSESSMENT/PLAN:  1. Elevated blood pressure reading in office with diagnosis of hypertension  2. Need for shingles vaccine  -     zoster recombinant adjuvanted vaccine [facility]) 50 MCG/0.5ML SUSR injection; Inject 0.5 mLs into the muscle See Admin Instructions 1 dose now and repeat in 2-6 months, Disp-0.5 mL, R-1Print  3. Vitamin D deficiency  4. Hyperlipidemia LDL goal <100  5. Agatston coronary artery calcium score between 200 and 399  6. Impaired glucose tolerance  7. Hypothyroidism, acquired, autoimmune  8. Pain in right upper arm  9. Paresthesia of upper extremity  Comments:  Bilateral  Orders:  -     XR CERVICAL SPINE (2-3 VIEWS); Future      Patient Instructions   Counseled on the need to lower blood pressure below 140/90 - she can try lifestyle modifications if she is willing to be committed to these - reduce sodium, increase water, reduce caffeine, and exercise but if unsuccessful we will need to raise Lisinopril to 40 mg daily  Monitor blood pressure 2-3 times/week and keep record to bring to next appointment  Emphasized the importance of being consistent with her meds 7 days/week rather than just during the work week - suggest setting an alarm on her phone for Saturday & Sundays  Encouraged to consider shingles vaccine (Shingrix) even if previously vaccinated with Zostavax (original live shingles vaccine). Advised that healthy adults 50 years and older should receive 2 doses of Shingrix -  by 2-6 months. Suggest checking with insurance to obtain coverage information.   A prescription will be printed and recommended to be purchased and administered at any local pharmacy  Recommend switching to Dr. Ivan Orellana to eliminate sugar but will still need to

## 2023-06-22 NOTE — PATIENT INSTRUCTIONS
Counseled on the need to lower blood pressure below 140/90 - she can try lifestyle modifications if she is willing to be committed to these - reduce sodium, increase water, reduce caffeine, and exercise but if unsuccessful we will need to raise Lisinopril to 40 mg daily  Monitor blood pressure 2-3 times/week and keep record to bring to next appointment  Emphasized the importance of being consistent with her meds 7 days/week rather than just during the work week - suggest setting an alarm on her phone for Saturday & Sundays  Encouraged to consider shingles vaccine (Shingrix) even if previously vaccinated with Zostavax (original live shingles vaccine). Advised that healthy adults 50 years and older should receive 2 doses of Shingrix -  by 2-6 months. Suggest checking with insurance to obtain coverage information.   A prescription will be printed and recommended to be purchased and administered at any local pharmacy  Recommend switching to Dr. Roxanne Wagner to eliminate sugar but will still need to limit intake due to caffeine  Asked to get fasting labs done within the next 2 weeks  Suggest imaging to evaluate cervical spine given that her upper extremity symptoms appear to be radicular rather than direction involving the arm/shoulder

## 2023-06-23 ENCOUNTER — TELEPHONE (OUTPATIENT)
Dept: INTERNAL MEDICINE CLINIC | Facility: CLINIC | Age: 63
End: 2023-06-23

## 2023-06-23 NOTE — TELEPHONE ENCOUNTER
----- Message from Jannet Payan PA-C sent at 6/22/2023  6:24 PM EDT -----  Please let patient know that the MRI that is listed in the chart as cervical is actually lumbar which I didn't catch until I went back to review while I was charting. We don't have any imaging on her neck. I have ordered an x-ray of neck which she can do at any of our hospital locations - could do here when she comes for labs on 6/29/23 or she could do in [place] if more convenient.

## 2023-07-13 ENCOUNTER — OFFICE VISIT (OUTPATIENT)
Dept: INTERNAL MEDICINE CLINIC | Facility: CLINIC | Age: 63
End: 2023-07-13
Payer: COMMERCIAL

## 2023-07-13 VITALS
OXYGEN SATURATION: 96 % | BODY MASS INDEX: 33.2 KG/M2 | DIASTOLIC BLOOD PRESSURE: 88 MMHG | HEIGHT: 62 IN | WEIGHT: 180.4 LBS | TEMPERATURE: 97.8 F | HEART RATE: 85 BPM | SYSTOLIC BLOOD PRESSURE: 144 MMHG

## 2023-07-13 DIAGNOSIS — R35.0 URINARY FREQUENCY: Primary | ICD-10-CM

## 2023-07-13 DIAGNOSIS — R31.9 HEMATURIA, UNSPECIFIED TYPE: ICD-10-CM

## 2023-07-13 DIAGNOSIS — R10.2 SUPRAPUBIC PRESSURE: ICD-10-CM

## 2023-07-13 DIAGNOSIS — R82.90 ABNORMAL FINDING ON URINALYSIS: ICD-10-CM

## 2023-07-13 LAB
BILIRUBIN, URINE, POC: NEGATIVE
BLOOD URINE, POC: ABNORMAL
GLUCOSE URINE, POC: NEGATIVE
KETONES, URINE, POC: NEGATIVE
LEUKOCYTE ESTERASE, URINE, POC: ABNORMAL
NITRITE, URINE, POC: POSITIVE
PH, URINE, POC: 7 (ref 4.6–8)
PROTEIN,URINE, POC: 300
SPECIFIC GRAVITY, URINE, POC: 1.03 (ref 1–1.03)
URINALYSIS CLARITY, POC: ABNORMAL
URINALYSIS COLOR, POC: ABNORMAL
UROBILINOGEN, POC: NORMAL

## 2023-07-13 PROCEDURE — 3079F DIAST BP 80-89 MM HG: CPT | Performed by: PHYSICIAN ASSISTANT

## 2023-07-13 PROCEDURE — 3077F SYST BP >= 140 MM HG: CPT | Performed by: PHYSICIAN ASSISTANT

## 2023-07-13 PROCEDURE — 81003 URINALYSIS AUTO W/O SCOPE: CPT | Performed by: PHYSICIAN ASSISTANT

## 2023-07-13 PROCEDURE — 99214 OFFICE O/P EST MOD 30 MIN: CPT | Performed by: PHYSICIAN ASSISTANT

## 2023-07-13 RX ORDER — CIPROFLOXACIN 500 MG/1
500 TABLET, FILM COATED ORAL 2 TIMES DAILY
Qty: 14 TABLET | Refills: 0 | Status: SHIPPED | OUTPATIENT
Start: 2023-07-13 | End: 2023-07-20

## 2023-07-13 RX ORDER — LISINOPRIL 20 MG/1
20 TABLET ORAL DAILY
COMMUNITY
Start: 2023-07-02

## 2023-07-13 SDOH — ECONOMIC STABILITY: HOUSING INSECURITY
IN THE LAST 12 MONTHS, WAS THERE A TIME WHEN YOU DID NOT HAVE A STEADY PLACE TO SLEEP OR SLEPT IN A SHELTER (INCLUDING NOW)?: NO

## 2023-07-13 SDOH — ECONOMIC STABILITY: FOOD INSECURITY: WITHIN THE PAST 12 MONTHS, THE FOOD YOU BOUGHT JUST DIDN'T LAST AND YOU DIDN'T HAVE MONEY TO GET MORE.: NEVER TRUE

## 2023-07-13 SDOH — ECONOMIC STABILITY: INCOME INSECURITY: HOW HARD IS IT FOR YOU TO PAY FOR THE VERY BASICS LIKE FOOD, HOUSING, MEDICAL CARE, AND HEATING?: NOT HARD AT ALL

## 2023-07-13 SDOH — ECONOMIC STABILITY: FOOD INSECURITY: WITHIN THE PAST 12 MONTHS, YOU WORRIED THAT YOUR FOOD WOULD RUN OUT BEFORE YOU GOT MONEY TO BUY MORE.: NEVER TRUE

## 2023-07-13 ASSESSMENT — ENCOUNTER SYMPTOMS
BACK PAIN: 1
VOMITING: 0
NAUSEA: 1

## 2023-07-13 NOTE — PATIENT INSTRUCTIONS
Start Cipro 500 mg twice daily x 7 days  Emphasized the importance of PUSHING fluids with primary intake being water  Discussed the possibility of kidney stones based on sudden onset of symptoms and gross hematuria but given lack of flank tenderness we will hold off on imaging unless she isn't improving or worsens  Continue chronic medications as prescribed  Work excuse given until Monday, 7/17/23

## 2023-07-13 NOTE — PROGRESS NOTES
Jose Luis Cruz (:  1960) is a 59 y.o. female,Established patient, here for evaluation of the following chief complaint(s):  Urinary Tract Infection (Pt started having sx's today(pain in vaginal area, blood in urine, lower back pain as well). ) and Nausea         ASSESSMENT/PLAN:  1. Urinary frequency  -     AMB POC URINALYSIS DIP STICK AUTO W/O MICRO  -     ciprofloxacin (CIPRO) 500 MG tablet; Take 1 tablet by mouth 2 times daily for 7 days, Disp-14 tablet, R-0Normal  2. Abnormal finding on urinalysis  -     Culture, Urine; Future  -     ciprofloxacin (CIPRO) 500 MG tablet; Take 1 tablet by mouth 2 times daily for 7 days, Disp-14 tablet, R-0Normal  3. Hematuria, unspecified type  -     AMB POC URINALYSIS DIP STICK AUTO W/O MICRO  -     ciprofloxacin (CIPRO) 500 MG tablet; Take 1 tablet by mouth 2 times daily for 7 days, Disp-14 tablet, R-0Normal  4. Suprapubic pressure  -     AMB POC URINALYSIS DIP STICK AUTO W/O MICRO  -     ciprofloxacin (CIPRO) 500 MG tablet; Take 1 tablet by mouth 2 times daily for 7 days, Disp-14 tablet, R-0Normal      Patient Instructions   Start Cipro 500 mg twice daily x 7 days  Emphasized the importance of PUSHING fluids with primary intake being water  Discussed the possibility of kidney stones based on sudden onset of symptoms and gross hematuria but given lack of flank tenderness we will hold off on imaging unless she isn't improving or worsens  Continue chronic medications as prescribed  Work excuse given until Monday, 23      Return pending urine culture results. Subjective   SUBJECTIVE/OBJECTIVE:  Urinary Frequency   This is a new problem. The current episode started today. The problem occurs every urination. The problem has been rapidly worsening. Quality: pressure. The pain is at a severity of 8/10. There has been no fever. She is Not sexually active. There is No history of pyelonephritis. Associated symptoms include frequency, hematuria and nausea.

## 2023-07-16 LAB
BACTERIA SPEC CULT: ABNORMAL
SERVICE CMNT-IMP: ABNORMAL

## 2023-07-17 NOTE — RESULT ENCOUNTER NOTE
Urine culture confirms E. Coli infection. She needs to follow-up with me in 1 week. - 7/27/23 @ 10:30 am.  She was also going to try and have fasting blood work done before this follow-up appointment.

## 2023-07-21 ENCOUNTER — HOSPITAL ENCOUNTER (OUTPATIENT)
Dept: GENERAL RADIOLOGY | Age: 63
Discharge: HOME | End: 2023-07-21
Payer: COMMERCIAL

## 2023-07-21 DIAGNOSIS — I10 ESSENTIAL HYPERTENSION, BENIGN: ICD-10-CM

## 2023-07-21 DIAGNOSIS — R20.2 PARESTHESIA OF UPPER EXTREMITY: ICD-10-CM

## 2023-07-21 DIAGNOSIS — R73.02 IMPAIRED GLUCOSE TOLERANCE: ICD-10-CM

## 2023-07-21 DIAGNOSIS — E78.5 HYPERLIPIDEMIA LDL GOAL <100: Primary | ICD-10-CM

## 2023-07-21 DIAGNOSIS — E78.5 HYPERLIPIDEMIA LDL GOAL <100: ICD-10-CM

## 2023-07-21 DIAGNOSIS — E06.3 HYPOTHYROIDISM, ACQUIRED, AUTOIMMUNE: ICD-10-CM

## 2023-07-21 DIAGNOSIS — R93.1 AGATSTON CORONARY ARTERY CALCIUM SCORE BETWEEN 200 AND 399: ICD-10-CM

## 2023-07-21 DIAGNOSIS — E55.9 VITAMIN D DEFICIENCY: ICD-10-CM

## 2023-07-21 LAB
25(OH)D3 SERPL-MCNC: 30 NG/ML (ref 30–100)
ALBUMIN SERPL-MCNC: 4 G/DL (ref 3.2–4.6)
ALBUMIN/GLOB SERPL: 1.3 (ref 0.4–1.6)
ALP SERPL-CCNC: 107 U/L (ref 50–136)
ALT SERPL-CCNC: 44 U/L (ref 12–65)
ANION GAP SERPL CALC-SCNC: 7 MMOL/L (ref 2–11)
AST SERPL-CCNC: 19 U/L (ref 15–37)
BILIRUB SERPL-MCNC: 0.3 MG/DL (ref 0.2–1.1)
BUN SERPL-MCNC: 13 MG/DL (ref 8–23)
CALCIUM SERPL-MCNC: 9.4 MG/DL (ref 8.3–10.4)
CHLORIDE SERPL-SCNC: 112 MMOL/L (ref 101–110)
CHOLEST SERPL-MCNC: 122 MG/DL
CO2 SERPL-SCNC: 26 MMOL/L (ref 21–32)
CREAT SERPL-MCNC: 0.6 MG/DL (ref 0.6–1)
GLOBULIN SER CALC-MCNC: 3 G/DL (ref 2.8–4.5)
GLUCOSE SERPL-MCNC: 88 MG/DL (ref 65–100)
HDLC SERPL-MCNC: 53 MG/DL (ref 40–60)
HDLC SERPL: 2.3
LDLC SERPL CALC-MCNC: 56.4 MG/DL
POTASSIUM SERPL-SCNC: 4.3 MMOL/L (ref 3.5–5.1)
PROT SERPL-MCNC: 7 G/DL (ref 6.3–8.2)
SODIUM SERPL-SCNC: 145 MMOL/L (ref 133–143)
TRIGL SERPL-MCNC: 63 MG/DL (ref 35–150)
TSH, 3RD GENERATION: 1.67 UIU/ML (ref 0.36–3.74)
VLDLC SERPL CALC-MCNC: 12.6 MG/DL (ref 6–23)

## 2023-07-21 PROCEDURE — 72040 X-RAY EXAM NECK SPINE 2-3 VW: CPT

## 2023-07-22 LAB
EST. AVERAGE GLUCOSE BLD GHB EST-MCNC: 131 MG/DL
HBA1C MFR BLD: 6.2 % (ref 4.8–5.6)

## 2023-07-24 ENCOUNTER — TELEPHONE (OUTPATIENT)
Dept: INTERNAL MEDICINE CLINIC | Facility: CLINIC | Age: 63
End: 2023-07-24

## 2023-07-24 NOTE — TELEPHONE ENCOUNTER
Patient wanted to speak with Mary Jane Tyler regarding her upcoming appointment on 7.27.23. She was recently diagnosed with COVID and was wanting some advise on what to do. Left her appointment on the schedule until we could get back to her.

## 2023-07-24 NOTE — TELEPHONE ENCOUNTER
Called patient back with advise from [facility] to have a VV tomorrow morning with Ricardo Blanchard to assess the COVID case and advise on her upcoming appointment with Jhoan Ayala this Thursday. She understood and scheduled the VV tomorrow with Ricardo Blanchard.

## 2023-07-24 NOTE — RESULT ENCOUNTER NOTE
Neck x-ray shows multilevel degeneration & facet arthropathy (arthritis between the vertebrae joints) with mild backward slippage of C3 on C4. We can discuss more at upcoming appointment.

## 2023-07-25 ENCOUNTER — TELEMEDICINE (OUTPATIENT)
Dept: INTERNAL MEDICINE CLINIC | Facility: CLINIC | Age: 63
End: 2023-07-25
Payer: COMMERCIAL

## 2023-07-25 DIAGNOSIS — U07.1 COVID-19: Primary | ICD-10-CM

## 2023-07-25 DIAGNOSIS — Z87.09 HISTORY OF ACUTE RESPIRATORY FAILURE: ICD-10-CM

## 2023-07-25 PROCEDURE — 99213 OFFICE O/P EST LOW 20 MIN: CPT | Performed by: PHYSICIAN ASSISTANT

## 2023-07-25 RX ORDER — ALBUTEROL SULFATE 2.5 MG/3ML
2.5 SOLUTION RESPIRATORY (INHALATION) 3 TIMES DAILY PRN
Qty: 90 EACH | Refills: 0 | Status: SHIPPED | OUTPATIENT
Start: 2023-07-25

## 2023-07-25 ASSESSMENT — ENCOUNTER SYMPTOMS
NAUSEA: 0
VOMITING: 0
WHEEZING: 0
SORE THROAT: 1
CHEST TIGHTNESS: 0
RHINORRHEA: 1
SHORTNESS OF BREATH: 0
EYE DISCHARGE: 0
SINUS PRESSURE: 0
COUGH: 1
DIARRHEA: 0

## 2023-07-25 NOTE — PATIENT INSTRUCTIONS
Start Paxlovid - take as directed  Suggest OTC Mucinex DM 12 Hour twice daily x 2 weeks  Begin albuterol treatments via nebulizer 2-3 times/day x 1-2 weeks  Stay well hydrated with plenty of water  Keep fever down with OTC Tylenol as needed  Temporarily hold Crestor while on Paxlovid & resume once antiviral course is completed  Continue chronic medications as prescribed

## 2023-07-25 NOTE — PROGRESS NOTES
Gab Estrada (:  1960) is a Established patient, presenting virtually for evaluation of the following:  Chief Complaint   Patient presents with    Positive For Covid-19     Sore throat, headache, cough, fatigue, fever x 4 days         Assessment & Plan   Below is the assessment and plan developed based on review of pertinent history, physical exam, labs, studies, and medications. 1. COVID-19  -     nirmatrelvir/ritonavir 300/100 (PAXLOVID) 20 x 150 MG & 10 x 100MG TBPK; Take 3 tablets (two 150 mg nirmatrelvir and one 100 mg ritonavir tablets) by mouth every 12 hours for 5 days. , Disp-30 tablet, R-0Normal  -     albuterol (PROVENTIL) (2.5 MG/3ML) 0.083% nebulizer solution; Take 3 mLs by nebulization 3 times daily as needed for Wheezing, Disp-90 each, R-0Normal  2. History of acute respiratory failure  -     nirmatrelvir/ritonavir 300/100 (PAXLOVID) 20 x 150 MG & 10 x 100MG TBPK; Take 3 tablets (two 150 mg nirmatrelvir and one 100 mg ritonavir tablets) by mouth every 12 hours for 5 days. , Disp-30 tablet, R-0Normal      Patient Instructions   Start Paxlovid - take as directed  Suggest OTC Mucinex DM 12 Hour twice daily x 2 weeks  Begin albuterol treatments via nebulizer 2-3 times/day x 1-2 weeks  Stay well hydrated with plenty of water  Keep fever down with OTC Tylenol as needed  Temporarily hold Crestor while on Paxlovid & resume once antiviral course is completed  Continue chronic medications as prescribed        Return in 1 week (on 2023) for routine f/u + COVID recheck @ 3:30 pm - patient aware. Subjective   Cough  This is a new problem. Episode onset: 3 days ago. The problem has been unchanged. The cough is Non-productive. Associated symptoms include chills, ear congestion, a fever (up to 100), headaches, myalgias, nasal congestion, rhinorrhea (clear sinus drainage) and a sore throat.  Pertinent negatives include no ear pain (congested bilaterally), postnasal drip, shortness of breath or

## 2023-07-31 ASSESSMENT — ENCOUNTER SYMPTOMS
CONSTIPATION: 0
DIARRHEA: 0
SHORTNESS OF BREATH: 0

## 2023-07-31 NOTE — PROGRESS NOTES
Kala Bunch (:  1960) is a 59 y.o. female,Established patient, here for evaluation of the following chief complaint(s):  Post-COVID Symptoms (Pt is here for a 1 week COVID f/u. She states that she is still experiencing a dry cough and fatigue. ), Follow-up (Hypertension, Hyperlipidemia, Vit D Def, Hypothyroidism), Results (Cervical X-ray), and Discuss Labs         ASSESSMENT/PLAN:  1. Hypothyroidism, acquired, autoimmune  2. DDD (degenerative disc disease), cervical  -     methocarbamol (ROBAXIN) 500 MG tablet; Take 1 tablet by mouth 2 times daily as needed (neck pain/tightness), Disp-40 tablet, R-0Normal  3. Vitamin D deficiency  -     Vitamin D 25 Hydroxy; Future  4. Hyperlipidemia LDL goal <100  5. Agatston coronary artery calcium score between 200 and 399  6. Impaired glucose tolerance  -     Hemoglobin A1C; Future  7. COVID-19  8.  Elevated blood pressure reading in office with diagnosis of hypertension  9. E. coli urinary tract infection  -     AMB POC URINALYSIS DIP STICK AUTO W/O MICRO      Patient Instructions   Increase OTC vitamin d3 to 3000 iu daily (3 x 1000 iu capsules)  Trial use of Robaxin 500 mg twice daily prn cervical symptoms including numbness - would advise consistent twice daily use x 2 weeks to see if that makes any difference in the numbness  Suggest using a memory foam contour pillow for sufficient cervical support while sleeping  Strongly encouraged to get updated COVID vaccine when available this  and plan to get high dose flu vaccine from us in October  Recommend resuming a regular exercise program but start small and gradually increase duration and frequency   Advised to try to eliminate sugar sources via Dr. Jesús Avila, snacks, desserts, etc with focus on using healthier options like fresh fruit or yogurt to satisfy sweet cravings  Continue chronic medications as prescribed  Monitor blood pressure daily and keep record to bring to next appointment      Return in 5 Yes - the patient is able to be screened

## 2023-08-01 ENCOUNTER — OFFICE VISIT (OUTPATIENT)
Dept: INTERNAL MEDICINE CLINIC | Facility: CLINIC | Age: 63
End: 2023-08-01
Payer: COMMERCIAL

## 2023-08-01 VITALS
SYSTOLIC BLOOD PRESSURE: 146 MMHG | OXYGEN SATURATION: 97 % | HEART RATE: 79 BPM | BODY MASS INDEX: 33.31 KG/M2 | HEIGHT: 62 IN | WEIGHT: 181 LBS | DIASTOLIC BLOOD PRESSURE: 88 MMHG | TEMPERATURE: 97.9 F

## 2023-08-01 DIAGNOSIS — U07.1 COVID-19: ICD-10-CM

## 2023-08-01 DIAGNOSIS — E78.5 HYPERLIPIDEMIA LDL GOAL <100: ICD-10-CM

## 2023-08-01 DIAGNOSIS — E06.3 HYPOTHYROIDISM, ACQUIRED, AUTOIMMUNE: ICD-10-CM

## 2023-08-01 DIAGNOSIS — E55.9 VITAMIN D DEFICIENCY: ICD-10-CM

## 2023-08-01 DIAGNOSIS — E06.3 HYPOTHYROIDISM, ACQUIRED, AUTOIMMUNE: Primary | ICD-10-CM

## 2023-08-01 DIAGNOSIS — Z00.00 ROUTINE PHYSICAL EXAMINATION: Primary | ICD-10-CM

## 2023-08-01 DIAGNOSIS — N39.0 E. COLI URINARY TRACT INFECTION: ICD-10-CM

## 2023-08-01 DIAGNOSIS — R73.02 IMPAIRED GLUCOSE TOLERANCE: ICD-10-CM

## 2023-08-01 DIAGNOSIS — B96.20 E. COLI URINARY TRACT INFECTION: ICD-10-CM

## 2023-08-01 DIAGNOSIS — M50.30 DDD (DEGENERATIVE DISC DISEASE), CERVICAL: ICD-10-CM

## 2023-08-01 DIAGNOSIS — R93.1 AGATSTON CORONARY ARTERY CALCIUM SCORE BETWEEN 200 AND 399: ICD-10-CM

## 2023-08-01 DIAGNOSIS — I10 ELEVATED BLOOD PRESSURE READING IN OFFICE WITH DIAGNOSIS OF HYPERTENSION: ICD-10-CM

## 2023-08-01 PROCEDURE — 99214 OFFICE O/P EST MOD 30 MIN: CPT | Performed by: PHYSICIAN ASSISTANT

## 2023-08-01 PROCEDURE — 3078F DIAST BP <80 MM HG: CPT | Performed by: PHYSICIAN ASSISTANT

## 2023-08-01 PROCEDURE — 3075F SYST BP GE 130 - 139MM HG: CPT | Performed by: PHYSICIAN ASSISTANT

## 2023-08-01 RX ORDER — METHOCARBAMOL 500 MG/1
500 TABLET, FILM COATED ORAL 2 TIMES DAILY PRN
Qty: 40 TABLET | Refills: 0 | Status: SHIPPED | OUTPATIENT
Start: 2023-08-01 | End: 2023-08-21

## 2023-08-01 RX ORDER — LISINOPRIL 20 MG/1
20 TABLET ORAL DAILY
Qty: 90 TABLET | Refills: 3 | Status: SHIPPED | OUTPATIENT
Start: 2023-08-01

## 2023-08-01 ASSESSMENT — ENCOUNTER SYMPTOMS
WHEEZING: 0
EYE DISCHARGE: 0
SORE THROAT: 0
COUGH: 1
RHINORRHEA: 1

## 2023-08-01 ASSESSMENT — PATIENT HEALTH QUESTIONNAIRE - PHQ9
1. LITTLE INTEREST OR PLEASURE IN DOING THINGS: 0
2. FEELING DOWN, DEPRESSED OR HOPELESS: 0
SUM OF ALL RESPONSES TO PHQ QUESTIONS 1-9: 0
SUM OF ALL RESPONSES TO PHQ QUESTIONS 1-9: 0
SUM OF ALL RESPONSES TO PHQ9 QUESTIONS 1 & 2: 0
SUM OF ALL RESPONSES TO PHQ QUESTIONS 1-9: 0
SUM OF ALL RESPONSES TO PHQ QUESTIONS 1-9: 0

## 2023-08-01 NOTE — PATIENT INSTRUCTIONS
Increase OTC vitamin d3 to 3000 iu daily (3 x 1000 iu capsules)  Trial use of Robaxin 500 mg twice daily prn cervical symptoms including numbness - would advise consistent twice daily use x 2 weeks to see if that makes any difference in the numbness  Suggest using a memory foam contour pillow for sufficient cervical support while sleeping  Strongly encouraged to get updated COVID vaccine when available this fall and plan to get high dose flu vaccine from us in October  Recommend resuming a regular exercise program but start small and gradually increase duration and frequency   Advised to try to eliminate sugar sources via Dr. Mitesh Richards, snacks, desserts, etc with focus on using healthier options like fresh fruit or yogurt to satisfy sweet cravings  Continue chronic medications as prescribed  Monitor blood pressure daily and keep record to bring to next appointment  Stay well hydrated with plenty of water

## 2023-08-02 ENCOUNTER — TELEPHONE (OUTPATIENT)
Dept: INTERNAL MEDICINE CLINIC | Facility: CLINIC | Age: 63
End: 2023-08-02

## 2023-08-02 NOTE — TELEPHONE ENCOUNTER
----- Message from Annemarie Soriano PA-C sent at 8/1/2023  4:36 PM EDT -----  I forgot to have her leave a urine to recheck her for the infection she was recently treated for. Tried to call her but went straight to  and it was full so unable to leave a message. Also can you get her scheduled for a nurse visit in late October for high dose flu vaccine - dx: need for influenza vaccine & history of respiratory failure. Also she'll need a fasting lab appointment prior to her end of December physical - I forgot to add this to her check out notes. Sorry!

## 2023-09-11 ENCOUNTER — TELEPHONE (OUTPATIENT)
Dept: INTERNAL MEDICINE CLINIC | Facility: CLINIC | Age: 63
End: 2023-09-11

## 2023-10-27 ENCOUNTER — NURSE ONLY (OUTPATIENT)
Dept: INTERNAL MEDICINE CLINIC | Facility: CLINIC | Age: 63
End: 2023-10-27
Payer: COMMERCIAL

## 2023-10-27 DIAGNOSIS — Z87.09 HISTORY OF RESPIRATORY FAILURE: Primary | ICD-10-CM

## 2023-10-27 DIAGNOSIS — Z23 NEED FOR IMMUNIZATION AGAINST INFLUENZA: ICD-10-CM

## 2023-10-27 PROCEDURE — 90471 IMMUNIZATION ADMIN: CPT | Performed by: PHYSICIAN ASSISTANT

## 2023-10-27 PROCEDURE — 90694 VACC AIIV4 NO PRSRV 0.5ML IM: CPT | Performed by: PHYSICIAN ASSISTANT

## 2023-11-26 NOTE — TELEPHONE ENCOUNTER
LVM on 6/14/22 @ 2:25 PM to inform pt that referral was placed and per Edwin Fink, resume night time use of O2 at 2 L.
Pt notified of her results and verbalized understanding. She would like to proceed with sleep study referral, please.
Received test results from overnight oximetry which indicates 310 desaturation events which are episodes where oxygen levels drop. She spent a total of almost 2 hours with oxygen levels below goal during the almost 7 hours testing was recording. I highly suspect she has sleep apnea but we need to proceed with a formal sleep study to diagnose. If she is agreeable, I will place a referral for this. Attempted to contact patient but voicemail answered and mailbox was full so I was unable to leave a message.
Referral placed. In the short term, please resume night time use of oxygen at 2 L.
APER

## 2024-01-04 DIAGNOSIS — I10 ELEVATED BLOOD PRESSURE READING IN OFFICE WITH DIAGNOSIS OF HYPERTENSION: ICD-10-CM

## 2024-01-04 DIAGNOSIS — E06.3 HYPOTHYROIDISM, ACQUIRED, AUTOIMMUNE: ICD-10-CM

## 2024-01-04 DIAGNOSIS — M15.9 PRIMARY OSTEOARTHRITIS INVOLVING MULTIPLE JOINTS: ICD-10-CM

## 2024-01-04 DIAGNOSIS — E78.5 HYPERLIPIDEMIA LDL GOAL <100: ICD-10-CM

## 2024-01-04 DIAGNOSIS — M51.36 LUMBAR DEGENERATIVE DISC DISEASE: ICD-10-CM

## 2024-01-04 DIAGNOSIS — R93.1 AGATSTON CORONARY ARTERY CALCIUM SCORE BETWEEN 200 AND 399: ICD-10-CM

## 2024-01-04 RX ORDER — ROSUVASTATIN CALCIUM 20 MG/1
20 TABLET, COATED ORAL
Qty: 90 TABLET | Refills: 3 | OUTPATIENT
Start: 2024-01-04

## 2024-01-04 RX ORDER — LISINOPRIL 20 MG/1
20 TABLET ORAL DAILY
Qty: 90 TABLET | Refills: 3 | OUTPATIENT
Start: 2024-01-04

## 2024-01-04 RX ORDER — MELOXICAM 7.5 MG/1
7.5 TABLET ORAL 2 TIMES DAILY WITH MEALS
Qty: 180 TABLET | Refills: 3 | OUTPATIENT
Start: 2024-01-04

## 2024-01-04 RX ORDER — LEVOTHYROXINE SODIUM 0.05 MG/1
50 TABLET ORAL
Qty: 90 TABLET | Refills: 3 | OUTPATIENT
Start: 2024-01-04

## 2024-03-08 DIAGNOSIS — E78.5 HYPERLIPIDEMIA LDL GOAL <100: ICD-10-CM

## 2024-03-08 DIAGNOSIS — R93.1 AGATSTON CORONARY ARTERY CALCIUM SCORE BETWEEN 200 AND 399: ICD-10-CM

## 2024-03-08 NOTE — TELEPHONE ENCOUNTER
Pt needs a refill on her Crestor 20Mg sent to     [facility] on [facility] in [place]    Has 2 tablets left.

## 2024-03-11 RX ORDER — ROSUVASTATIN CALCIUM 20 MG/1
20 TABLET, COATED ORAL
Qty: 90 TABLET | Refills: 0 | Status: SHIPPED | OUTPATIENT
Start: 2024-03-11

## 2024-03-18 DIAGNOSIS — E78.5 HYPERLIPIDEMIA LDL GOAL <100: ICD-10-CM

## 2024-03-18 DIAGNOSIS — R73.02 IMPAIRED GLUCOSE TOLERANCE: ICD-10-CM

## 2024-03-18 DIAGNOSIS — E06.3 HYPOTHYROIDISM, ACQUIRED, AUTOIMMUNE: ICD-10-CM

## 2024-03-18 DIAGNOSIS — E55.9 VITAMIN D DEFICIENCY: ICD-10-CM

## 2024-03-18 DIAGNOSIS — Z00.00 ROUTINE PHYSICAL EXAMINATION: Primary | ICD-10-CM

## 2024-03-21 ENCOUNTER — NURSE ONLY (OUTPATIENT)
Dept: INTERNAL MEDICINE CLINIC | Facility: CLINIC | Age: 64
End: 2024-03-21

## 2024-03-21 DIAGNOSIS — E06.3 HYPOTHYROIDISM, ACQUIRED, AUTOIMMUNE: ICD-10-CM

## 2024-03-21 DIAGNOSIS — E78.5 HYPERLIPIDEMIA LDL GOAL <100: ICD-10-CM

## 2024-03-21 DIAGNOSIS — E55.9 VITAMIN D DEFICIENCY: ICD-10-CM

## 2024-03-21 DIAGNOSIS — R73.02 IMPAIRED GLUCOSE TOLERANCE: ICD-10-CM

## 2024-03-21 DIAGNOSIS — Z00.00 ROUTINE PHYSICAL EXAMINATION: ICD-10-CM

## 2024-03-21 LAB
25(OH)D3 SERPL-MCNC: 39 NG/ML (ref 30–100)
ALBUMIN SERPL-MCNC: 4.4 G/DL (ref 3.2–4.6)
ALBUMIN/GLOB SERPL: 1.6 (ref 0.4–1.6)
ALP SERPL-CCNC: 108 U/L (ref 50–136)
ALT SERPL-CCNC: 32 U/L (ref 12–65)
ANION GAP SERPL CALC-SCNC: 4 MMOL/L (ref 2–11)
APPEARANCE UR: CLEAR
AST SERPL-CCNC: 19 U/L (ref 15–37)
BACTERIA URNS QL MICRO: NEGATIVE /HPF
BASOPHILS # BLD: 0 K/UL (ref 0–0.2)
BASOPHILS NFR BLD: 0 % (ref 0–2)
BILIRUB SERPL-MCNC: 0.5 MG/DL (ref 0.2–1.1)
BILIRUB UR QL: NEGATIVE
BUN SERPL-MCNC: 17 MG/DL (ref 8–23)
CALCIUM SERPL-MCNC: 10.1 MG/DL (ref 8.3–10.4)
CASTS URNS QL MICRO: NORMAL /LPF (ref 0–2)
CHLORIDE SERPL-SCNC: 107 MMOL/L (ref 103–113)
CHOLEST SERPL-MCNC: 127 MG/DL
CO2 SERPL-SCNC: 30 MMOL/L (ref 21–32)
COLOR UR: NORMAL
CREAT SERPL-MCNC: 0.7 MG/DL (ref 0.6–1)
DIFFERENTIAL METHOD BLD: NORMAL
EOSINOPHIL # BLD: 0.1 K/UL (ref 0–0.8)
EOSINOPHIL NFR BLD: 1 % (ref 0.5–7.8)
EPI CELLS #/AREA URNS HPF: NORMAL /HPF (ref 0–5)
ERYTHROCYTE [DISTWIDTH] IN BLOOD BY AUTOMATED COUNT: 12.7 % (ref 11.9–14.6)
GLOBULIN SER CALC-MCNC: 2.7 G/DL (ref 2.8–4.5)
GLUCOSE SERPL-MCNC: 85 MG/DL (ref 65–100)
GLUCOSE UR STRIP.AUTO-MCNC: NEGATIVE MG/DL
HCT VFR BLD AUTO: 41.6 % (ref 35.8–46.3)
HDLC SERPL-MCNC: 54 MG/DL (ref 40–60)
HDLC SERPL: 2.4
HGB BLD-MCNC: 13.3 G/DL (ref 11.7–15.4)
HGB UR QL STRIP: NEGATIVE
IMM GRANULOCYTES # BLD AUTO: 0 K/UL (ref 0–0.5)
IMM GRANULOCYTES NFR BLD AUTO: 0 % (ref 0–5)
KETONES UR QL STRIP.AUTO: NEGATIVE MG/DL
LDLC SERPL CALC-MCNC: 60.4 MG/DL
LEUKOCYTE ESTERASE UR QL STRIP.AUTO: NEGATIVE
LYMPHOCYTES # BLD: 1.6 K/UL (ref 0.5–4.6)
LYMPHOCYTES NFR BLD: 23 % (ref 13–44)
MCH RBC QN AUTO: 29.1 PG (ref 26.1–32.9)
MCHC RBC AUTO-ENTMCNC: 32 G/DL (ref 31.4–35)
MCV RBC AUTO: 91 FL (ref 82–102)
MONOCYTES # BLD: 0.8 K/UL (ref 0.1–1.3)
MONOCYTES NFR BLD: 11 % (ref 4–12)
MUCOUS THREADS URNS QL MICRO: 0 /LPF
NEUTS SEG # BLD: 4.4 K/UL (ref 1.7–8.2)
NEUTS SEG NFR BLD: 65 % (ref 43–78)
NITRITE UR QL STRIP.AUTO: NEGATIVE
NRBC # BLD: 0 K/UL (ref 0–0.2)
PH UR STRIP: 6 (ref 5–9)
PLATELET # BLD AUTO: 199 K/UL (ref 150–450)
PMV BLD AUTO: 9.9 FL (ref 9.4–12.3)
POTASSIUM SERPL-SCNC: 4.2 MMOL/L (ref 3.5–5.1)
PROT SERPL-MCNC: 7.1 G/DL (ref 6.3–8.2)
PROT UR STRIP-MCNC: NEGATIVE MG/DL
RBC # BLD AUTO: 4.57 M/UL (ref 4.05–5.2)
RBC #/AREA URNS HPF: NORMAL /HPF (ref 0–5)
SODIUM SERPL-SCNC: 141 MMOL/L (ref 136–146)
SP GR UR REFRACTOMETRY: 1.02 (ref 1–1.02)
TRIGL SERPL-MCNC: 63 MG/DL (ref 35–150)
TSH, 3RD GENERATION: 1.43 UIU/ML (ref 0.36–3.74)
URINE CULTURE IF INDICATED: NORMAL
UROBILINOGEN UR QL STRIP.AUTO: 0.2 EU/DL (ref 0.2–1)
VLDLC SERPL CALC-MCNC: 12.6 MG/DL (ref 6–23)
WBC # BLD AUTO: 6.9 K/UL (ref 4.3–11.1)
WBC URNS QL MICRO: NORMAL /HPF (ref 0–4)

## 2024-03-22 LAB
EST. AVERAGE GLUCOSE BLD GHB EST-MCNC: 117 MG/DL
HBA1C MFR BLD: 5.7 % (ref 4.8–5.6)

## 2024-03-27 NOTE — PROGRESS NOTES
EKG Interpretation:  Rhythm: normal sinus rhythm and regular . Rate (approx.): 62; Axis: normal; P wave: normal; QRS interval: normal; ST/T wave: normal; Other findings: unchanged from previous ekg on 12/22/22.       Recent Results (from the past 336 hour(s))   Hemoglobin A1C    Collection Time: 03/21/24 11:57 AM   Result Value Ref Range    Hemoglobin A1C 5.7 (H) 4.8 - 5.6 %    Estimated Avg Glucose 117 mg/dL   Vitamin D 25 Hydroxy    Collection Time: 03/21/24 11:57 AM   Result Value Ref Range    Vit D, 25-Hydroxy 39.0 30.0 - 100.0 ng/mL   Urinalysis with Reflex to Culture    Collection Time: 03/21/24 11:57 AM    Specimen: Urine   Result Value Ref Range    Color, UA YELLOW/STRAW      Appearance CLEAR      Specific Gravity, UA 1.022 1.001 - 1.023      pH, Urine 6.0 5.0 - 9.0      Protein, UA Negative Negative mg/dL    Glucose, UA Negative mg/dL    Ketones, Urine Negative Negative mg/dL    Bilirubin Urine Negative Negative      Blood, Urine Negative Negative      Urobilinogen, Urine 0.2 0.2 - 1.0 EU/dL    Nitrite, Urine Negative Negative      Leukocyte Esterase, Urine Negative Negative      Urine Culture if Indicated CULTURE NOT INDICATED BY UA RESULT      WBC, UA 0-4 0 - 4 /hpf    RBC, UA 0-5 0 - 5 /hpf    BACTERIA, URINE Negative Negative /hpf    Epithelial Cells UA 0-5 0 - 5 /hpf    Casts 0-2 0 - 2 /lpf    Mucus, UA 0 0 /lpf   TSH    Collection Time: 03/21/24 11:57 AM   Result Value Ref Range    TSH, 3RD GENERATION 1.430 0.358 - 3.740 uIU/mL   Lipid Panel    Collection Time: 03/21/24 11:57 AM   Result Value Ref Range    Cholesterol, Total 127 <200 MG/DL    Triglycerides 63 35 - 150 MG/DL    HDL 54 40 - 60 MG/DL    LDL Calculated 60.4 <100 MG/DL    VLDL Cholesterol Calculated 12.6 6.0 - 23.0 MG/DL    Chol/HDL Ratio 2.4     Comprehensive Metabolic Panel    Collection Time: 03/21/24 11:57 AM   Result Value Ref Range    Sodium 141 136 - 146 mmol/L    Potassium 4.2 3.5 - 5.1 mmol/L    Chloride 107 103 - 113

## 2024-03-28 ENCOUNTER — OFFICE VISIT (OUTPATIENT)
Dept: INTERNAL MEDICINE CLINIC | Facility: CLINIC | Age: 64
End: 2024-03-28
Payer: COMMERCIAL

## 2024-03-28 VITALS
HEART RATE: 64 BPM | HEIGHT: 62 IN | TEMPERATURE: 97.3 F | DIASTOLIC BLOOD PRESSURE: 82 MMHG | WEIGHT: 178 LBS | OXYGEN SATURATION: 98 % | BODY MASS INDEX: 32.76 KG/M2 | SYSTOLIC BLOOD PRESSURE: 131 MMHG

## 2024-03-28 DIAGNOSIS — Z00.00 ROUTINE GENERAL MEDICAL EXAMINATION AT A HEALTH CARE FACILITY: Primary | ICD-10-CM

## 2024-03-28 DIAGNOSIS — M15.9 PRIMARY OSTEOARTHRITIS INVOLVING MULTIPLE JOINTS: ICD-10-CM

## 2024-03-28 DIAGNOSIS — E78.5 HYPERLIPIDEMIA LDL GOAL <100: ICD-10-CM

## 2024-03-28 DIAGNOSIS — M51.36 LUMBAR DEGENERATIVE DISC DISEASE: ICD-10-CM

## 2024-03-28 DIAGNOSIS — I10 ESSENTIAL (PRIMARY) HYPERTENSION: ICD-10-CM

## 2024-03-28 DIAGNOSIS — M85.89 OSTEOPENIA OF MULTIPLE SITES: ICD-10-CM

## 2024-03-28 DIAGNOSIS — R73.02 IMPAIRED GLUCOSE TOLERANCE: ICD-10-CM

## 2024-03-28 DIAGNOSIS — Z12.31 SCREENING MAMMOGRAM FOR BREAST CANCER: ICD-10-CM

## 2024-03-28 DIAGNOSIS — Z78.0 POSTMENOPAUSAL: ICD-10-CM

## 2024-03-28 DIAGNOSIS — E55.9 VITAMIN D DEFICIENCY: ICD-10-CM

## 2024-03-28 DIAGNOSIS — Z23 NEED FOR SHINGLES VACCINE: ICD-10-CM

## 2024-03-28 DIAGNOSIS — M50.30 DEGENERATIVE DISC DISEASE, CERVICAL: ICD-10-CM

## 2024-03-28 DIAGNOSIS — E06.3 HYPOTHYROIDISM, ACQUIRED, AUTOIMMUNE: ICD-10-CM

## 2024-03-28 DIAGNOSIS — R93.1 AGATSTON CORONARY ARTERY CALCIUM SCORE BETWEEN 200 AND 399: ICD-10-CM

## 2024-03-28 PROCEDURE — 3075F SYST BP GE 130 - 139MM HG: CPT | Performed by: PHYSICIAN ASSISTANT

## 2024-03-28 PROCEDURE — 93000 ELECTROCARDIOGRAM COMPLETE: CPT | Performed by: PHYSICIAN ASSISTANT

## 2024-03-28 PROCEDURE — 3079F DIAST BP 80-89 MM HG: CPT | Performed by: PHYSICIAN ASSISTANT

## 2024-03-28 PROCEDURE — 99396 PREV VISIT EST AGE 40-64: CPT | Performed by: PHYSICIAN ASSISTANT

## 2024-03-28 RX ORDER — LEVOTHYROXINE SODIUM 0.05 MG/1
50 TABLET ORAL
Qty: 90 TABLET | Refills: 3 | Status: SHIPPED | OUTPATIENT
Start: 2024-03-28

## 2024-03-28 RX ORDER — ZOSTER VACCINE RECOMBINANT, ADJUVANTED 50 MCG/0.5
0.5 KIT INTRAMUSCULAR SEE ADMIN INSTRUCTIONS
Qty: 0.5 ML | Refills: 1 | Status: SHIPPED | OUTPATIENT
Start: 2024-03-28 | End: 2024-04-03

## 2024-03-28 RX ORDER — MELOXICAM 7.5 MG/1
7.5 TABLET ORAL 2 TIMES DAILY WITH MEALS
Qty: 180 TABLET | Refills: 3 | Status: SHIPPED | OUTPATIENT
Start: 2024-03-28

## 2024-03-28 RX ORDER — ROSUVASTATIN CALCIUM 20 MG/1
20 TABLET, COATED ORAL
Qty: 90 TABLET | Refills: 3 | Status: SHIPPED | OUTPATIENT
Start: 2024-03-28

## 2024-03-28 ASSESSMENT — PATIENT HEALTH QUESTIONNAIRE - PHQ9
2. FEELING DOWN, DEPRESSED OR HOPELESS: NOT AT ALL
SUM OF ALL RESPONSES TO PHQ QUESTIONS 1-9: 0
SUM OF ALL RESPONSES TO PHQ QUESTIONS 1-9: 0
SUM OF ALL RESPONSES TO PHQ9 QUESTIONS 1 & 2: 0
1. LITTLE INTEREST OR PLEASURE IN DOING THINGS: NOT AT ALL
SUM OF ALL RESPONSES TO PHQ QUESTIONS 1-9: 0
SUM OF ALL RESPONSES TO PHQ QUESTIONS 1-9: 0

## 2024-03-29 DIAGNOSIS — M50.30 DDD (DEGENERATIVE DISC DISEASE), CERVICAL: ICD-10-CM

## 2024-03-29 NOTE — TELEPHONE ENCOUNTER
----- Message from Nguyễn Boland sent at 3/29/2024 10:01 AM EDT -----  Subject: Refill Request    QUESTIONS  Name of Medication? methocarbamol (ROBAXIN) 500 MG tablet  Patient-reported dosage and instructions? Take 1 tablet by mouth 2 times   daily as needed (neck pain/tightness)  How many days do you have left? 0  Preferred Pharmacy? [id number]  Pharmacy phone number (if available)? [phone]  ---------------------------------------------------------------------------  --------------  CALL BACK INFO  What is the best way for the office to contact you? OK to leave message on   voicemail  Preferred Call Back Phone Number? [phone]  ---------------------------------------------------------------------------  --------------  SCRIPT ANSWERS  Relationship to Patient? Self

## 2024-04-03 ENCOUNTER — TELEPHONE (OUTPATIENT)
Dept: INTERNAL MEDICINE CLINIC | Facility: CLINIC | Age: 64
End: 2024-04-03

## 2024-04-03 RX ORDER — ZOSTER VACCINE RECOMBINANT, ADJUVANTED 50 MCG/0.5
0.5 KIT INTRAMUSCULAR SEE ADMIN INSTRUCTIONS
Qty: 0.5 ML | Refills: 1 | Status: SHIPPED | OUTPATIENT
Start: 2024-04-03 | End: 2024-09-30

## 2024-04-03 NOTE — TELEPHONE ENCOUNTER
Re-printed Zoster RX and mailed to pt's home address on file. Tried to contact pt on 4/3/24 @ 2325- v/m full.

## 2024-04-04 ENCOUNTER — TELEPHONE (OUTPATIENT)
Dept: INTERNAL MEDICINE CLINIC | Facility: CLINIC | Age: 64
End: 2024-04-04

## 2024-04-04 NOTE — TELEPHONE ENCOUNTER
Pt called in returning a call for Mary, Mary was unavailable at the time and I relayed that to pt, I asked pt if there is a message she would like for me to deliver and she stated to call her work number    [phone] when Mary is able to do so.

## 2024-04-05 NOTE — PATIENT INSTRUCTIONS
Praised her lifestyle efforts and urged her to resume a regular exercise routine to maintain reversal trend of glucose metabolism  Continue chronic medications as prescribed  Monitor blood pressure 1-2 times/week and keep record to bring to next appointment  Encouraged to consider shingles vaccine (Shingrix) even if previously vaccinated with Zostavax (original live shingles vaccine).  Advised that healthy adults 50 years and older should receive 2 doses of Shingrix -  by 2-6 months.  Suggest checking with insurance to obtain coverage information.  A prescription will be printed and recommended to be purchased and administered at any local pharmacy  Reminded to stay well hydrated with plenty of water  Recommend monthly self breast exams

## 2024-04-15 RX ORDER — METHOCARBAMOL 500 MG/1
500 TABLET, FILM COATED ORAL 2 TIMES DAILY PRN
Qty: 40 TABLET | Refills: 0 | Status: SHIPPED | OUTPATIENT
Start: 2024-04-15 | End: 2024-05-05

## 2024-04-15 NOTE — TELEPHONE ENCOUNTER
At her most recent visit she said she hadn't tried it yet.  Can she provide update on how it is working/helping her and how often she is using it?  Any side effects?

## 2024-04-15 NOTE — TELEPHONE ENCOUNTER
When I spoke with pt, she told me that she had not started taking it yet and the RX , so she was requesting a new RX to try it.

## 2024-04-18 ENCOUNTER — TELEPHONE (OUTPATIENT)
Dept: INTERNAL MEDICINE CLINIC | Facility: CLINIC | Age: 64
End: 2024-04-18

## 2024-04-18 NOTE — TELEPHONE ENCOUNTER
Pt called w/concerns of head congestion and fatigue. Offered next avail, at the time same day VV w/NP. Pt requ next avail OV w/PCP only. Offered next avail, at the time 4/23. Pt declined appt. Pt disconnected call.

## 2024-04-18 NOTE — TELEPHONE ENCOUNTER
She should do at at home COVID test.  If negative, she can treat the congestion with Claritin-D or Allegra-D if she's able to tolerate decongestants ok.  I can work her in on Monday @ 2:30 but unfortunately can't do anything before then given that I don't work on Fridays and can't stay late today.

## 2024-04-19 RX ORDER — PREDNISONE 20 MG/1
TABLET ORAL
COMMUNITY
Start: 2024-04-17

## 2024-04-22 ENCOUNTER — OFFICE VISIT (OUTPATIENT)
Dept: INTERNAL MEDICINE CLINIC | Facility: CLINIC | Age: 64
End: 2024-04-22
Payer: COMMERCIAL

## 2024-04-22 VITALS
OXYGEN SATURATION: 99 % | DIASTOLIC BLOOD PRESSURE: 90 MMHG | BODY MASS INDEX: 33.13 KG/M2 | SYSTOLIC BLOOD PRESSURE: 146 MMHG | HEART RATE: 73 BPM | WEIGHT: 180 LBS | HEIGHT: 62 IN | TEMPERATURE: 98.1 F

## 2024-04-22 DIAGNOSIS — H53.133 VISION, LOSS, SUDDEN, BILATERAL: ICD-10-CM

## 2024-04-22 DIAGNOSIS — J06.9 VIRAL UPPER RESPIRATORY TRACT INFECTION WITH COUGH: Primary | ICD-10-CM

## 2024-04-22 DIAGNOSIS — L91.8 ACQUIRED SKIN TAG: ICD-10-CM

## 2024-04-22 PROCEDURE — 3077F SYST BP >= 140 MM HG: CPT | Performed by: PHYSICIAN ASSISTANT

## 2024-04-22 PROCEDURE — 3080F DIAST BP >= 90 MM HG: CPT | Performed by: PHYSICIAN ASSISTANT

## 2024-04-22 PROCEDURE — 99214 OFFICE O/P EST MOD 30 MIN: CPT | Performed by: PHYSICIAN ASSISTANT

## 2024-04-22 RX ORDER — FEXOFENADINE HCL 180 MG/1
180 TABLET ORAL DAILY
COMMUNITY

## 2024-04-22 ASSESSMENT — PATIENT HEALTH QUESTIONNAIRE - PHQ9
1. LITTLE INTEREST OR PLEASURE IN DOING THINGS: NOT AT ALL
SUM OF ALL RESPONSES TO PHQ QUESTIONS 1-9: 0
SUM OF ALL RESPONSES TO PHQ9 QUESTIONS 1 & 2: 0
SUM OF ALL RESPONSES TO PHQ QUESTIONS 1-9: 0
2. FEELING DOWN, DEPRESSED OR HOPELESS: NOT AT ALL
SUM OF ALL RESPONSES TO PHQ QUESTIONS 1-9: 0
SUM OF ALL RESPONSES TO PHQ QUESTIONS 1-9: 0

## 2024-04-22 ASSESSMENT — ENCOUNTER SYMPTOMS
SORE THROAT: 1
COUGH: 1
VOMITING: 0
RHINORRHEA: 1
SINUS PAIN: 0
ABDOMINAL PAIN: 0
NAUSEA: 0
DIARRHEA: 1
WHEEZING: 0

## 2024-04-22 NOTE — PATIENT INSTRUCTIONS
Discussed risks of prednisone use when not absolutely necessary - recommend avoidance for routine colds & sinus infections  Remain on OTC antihistamine until pollen counts are down  Reassured that the lump on her back is a large skin tag and is benign  Advised contacting her eye doctor for an acute visit to evaluate for potential source of recent vision loss episode  Continue chronic medications as prescribed  Reminded to stay well hydrated with plenty of water

## 2024-04-22 NOTE — PROGRESS NOTES
Prisca Murray (:  1960) is a 64 y.o. female,Established patient, here for evaluation of the following chief complaint(s):  Congestion (Pt c/o head congestion and fatigue for the past 2 weeks. Her at home COVID test was negative last Thursday.), Mass (Pt c/o lump on the middle of her lower back that has grown in size recently. ), and Migraine (Pt c/o ocular migraine this past Saturday while driving. )      Assessment & Plan   1. Viral upper respiratory tract infection with cough  2. Vision, loss, sudden, bilateral  3. Acquired skin tag      Patient Instructions   Discussed risks of prednisone use when not absolutely necessary - recommend avoidance for routine colds & sinus infections  Remain on OTC antihistamine until pollen counts are down  Reassured that the lump on her back is a large skin tag and is benign  Advised contacting her eye doctor for an acute visit to evaluate for potential source of recent vision loss episode  Continue chronic medications as prescribed  Reminded to stay well hydrated with plenty of water      Return if symptoms worsen or fail to improve.       Subjective        Mass  This is a new problem. Episode onset: 3 days ago. The problem occurs constantly. The problem has been unchanged. Associated symptoms include congestion, coughing (dry x 1 week) and a sore throat (scratchy). Pertinent negatives include no abdominal pain, chest pain, nausea, rash or vomiting. Associated symptoms comments: Negative for pain or itching. Nothing aggravates the symptoms.   URI   This is a new problem. Episode onset: 2 weeks ago. The problem has been gradually improving. Maximum temperature: subjective. The fever has been present for 1 to 2 days. Associated symptoms include congestion, coughing (dry x 1 week), diarrhea, a plugged ear sensation, rhinorrhea (clear sinus drainage) and a sore throat (scratchy). Pertinent negatives include no abdominal pain, chest pain, ear pain, nausea, rash, sinus

## 2024-04-24 ENCOUNTER — TELEPHONE (OUTPATIENT)
Dept: INTERNAL MEDICINE CLINIC | Facility: CLINIC | Age: 64
End: 2024-04-24

## 2024-04-24 NOTE — TELEPHONE ENCOUNTER
----- Message from Xin Peguero PA-C sent at 4/23/2024  4:54 PM EDT -----  Please check with patient and see if she wants to see cardiology (as eye doctor mentioned) or focus more on staying hydrated and revisit it if it happens again?    ----- Message -----  From: Rozina Castro  Sent: 4/23/2024   3:18 PM EDT  To: Xin Peguero PA-C

## 2024-05-13 ENCOUNTER — PREP FOR PROCEDURE (OUTPATIENT)
Dept: PODIATRY | Age: 64
End: 2024-05-13

## 2024-05-13 DIAGNOSIS — M20.41 ACQUIRED HAMMERTOE OF RIGHT FOOT: ICD-10-CM

## 2024-05-14 PROBLEM — M20.41 ACQUIRED HAMMERTOE OF RIGHT FOOT: Status: ACTIVE | Noted: 2024-05-13

## 2024-05-31 ENCOUNTER — HOSPITAL ENCOUNTER (OUTPATIENT)
Dept: MAMMOGRAPHY | Age: 64
Discharge: HOME | End: 2024-05-31
Payer: COMMERCIAL

## 2024-05-31 ENCOUNTER — HOSPITAL ENCOUNTER (OUTPATIENT)
Dept: MAMMOGRAPHY | Age: 64
End: 2024-05-31
Payer: COMMERCIAL

## 2024-05-31 VITALS — BODY MASS INDEX: 33.13 KG/M2 | HEIGHT: 62 IN | WEIGHT: 180 LBS

## 2024-05-31 DIAGNOSIS — Z12.31 SCREENING MAMMOGRAM FOR BREAST CANCER: ICD-10-CM

## 2024-05-31 DIAGNOSIS — M85.89 OSTEOPENIA OF MULTIPLE SITES: ICD-10-CM

## 2024-05-31 DIAGNOSIS — Z78.0 POSTMENOPAUSAL: ICD-10-CM

## 2024-05-31 PROCEDURE — 77063 BREAST TOMOSYNTHESIS BI: CPT

## 2024-05-31 PROCEDURE — 77080 DXA BONE DENSITY AXIAL: CPT

## 2024-06-03 NOTE — RESULT ENCOUNTER NOTE
Bone density shows osteopenia in L hip neck this time.  Please be sure to be consistent with calcium supplements twice daily and weight bearing exercises to maintain bone density.

## 2024-06-03 NOTE — RESULT ENCOUNTER NOTE
Mammogram shows no suspicious areas of concern.  Remind to do monthly self breast exams.  Plan to repeat imaging in 1 year.

## 2024-06-28 DIAGNOSIS — M50.30 DDD (DEGENERATIVE DISC DISEASE), CERVICAL: ICD-10-CM

## 2024-06-28 RX ORDER — METHOCARBAMOL 500 MG/1
500 TABLET, FILM COATED ORAL 2 TIMES DAILY PRN
Qty: 40 TABLET | Refills: 0 | OUTPATIENT
Start: 2024-06-28 | End: 2024-07-18

## 2024-07-08 NOTE — PROGRESS NOTES
Patient verified name and .  Order for consent not found in EHR and matches case posting; patient verifies procedure.   Type 1B surgery, phone assessment complete.  Orders not received.  Labs per surgeon: none  Labs per anesthesia protocol: none    Patient answered medical/surgical history questions at their best of ability. All prior to admission medications documented in EPIC.    Patient instructed to continue taking all prescription medications up to the day of surgery but to take only the following medications the day of surgery according to anesthesia guidelines with a small sip of water: levothyroxine Also, patient is requested to take 2 Tylenol in the morning and then again before bed on the day before surgery. Regular or extra strength may be used.       Patient informed that all vitamins and supplements should be held 7 days prior to surgery and NSAIDS 5 days prior to surgery. Prescription meds to hold:Meloxicam    Patient instructed on the following:    > Arrive at OP Entrance, time of arrival to be called the day before by 1700  > NPO after midnight, unless otherwise indicated, including gum, mints, and ice chips  > Responsible adult must drive patient to the hospital, stay during surgery, and patient will need supervision 24 hours after anesthesia  > Use non moisturizing soap in shower the night before surgery and on the morning of surgery  > All piercings must be removed prior to arrival.    > Leave all valuables (money and jewelry) at home but bring insurance card and ID on DOS.   > You may be required to pay a deductible or co-pay on the day of your procedure. You can pre-pay by calling [phone] if your surgery is at the [facility] or [phone] if your surgery is at the [facility].  > Do not wear make-up, nail polish, lotions, cologne, perfumes, powders, or oil on skin. Artificial nails are not permitted.     [Fair] :  ~his/her~ mood as fair [Former] : Former [No] : No [No falls in past year] : Patient reported no falls in the past year [0] : 2) Feeling down, depressed, or hopeless: Not at all (0) [None] : None [High School] : high school [] :  [Reports normal functional visual acuity (ie: able to read med bottle)] : Reports normal functional visual acuity [Smoke Detector] : smoke detector [Carbon Monoxide Detector] : carbon monoxide detector [Safety elements used in home] : safety elements used in home [Seat Belt] :  uses seat belt [Sunscreen] : uses sunscreen [TB Exposure] : is being exposed to tuberculosis [Reviewed no changes] : Reviewed, no changes [de-identified] : Sedentary [de-identified] : Regular [Change in mental status noted] : No change in mental status noted [Language] : denies difficulty with language [Behavior] : denies difficulty with behavior [Learning/Retaining New Information] : denies difficulty learning/retaining new information [Handling Complex Tasks] : denies difficulty handling complex tasks [Reasoning] : denies difficulty with reasoning [Spatial Ability and Orientation] : denies difficulty with spatial ability and orientation [Sexually Active] : not sexually active [Reports changes in hearing] : Reports no changes in hearing [Reports changes in vision] : Reports no changes in vision [Reports changes in dental health] : Reports no changes in dental health [Guns at Home] : no guns at home [Travel to Developing Areas] : does not  travel to developing areas [Caregiver Concerns] : does not have caregiver concerns [de-identified] : Needs help [de-identified] : Needs help

## 2024-07-11 ENCOUNTER — ANESTHESIA EVENT (OUTPATIENT)
Dept: SURGERY | Age: 64
End: 2024-07-11
Payer: COMMERCIAL

## 2024-07-11 NOTE — PERIOP NOTE
Preop department called to notify patient of arrival time for scheduled procedure. Instructions given to   - Arrive at OPC Entrance [address]  - Remain NPO after midnight, unless otherwise indicated, including gum, mints, and ice chips.   - Have a responsible adult to drive patient to the hospital, stay during surgery, and patient will need supervision 24 hours after anesthesia.   - Use antibacterial soap in shower the night before surgery and on the morning of surgery.       Was patient contacted: YES  Voicemail left:   Numbers contacted: [phone]   Arrival time: 1000

## 2024-07-12 ENCOUNTER — ANESTHESIA (OUTPATIENT)
Dept: SURGERY | Age: 64
End: 2024-07-12
Payer: COMMERCIAL

## 2024-07-12 ENCOUNTER — HOSPITAL ENCOUNTER (OUTPATIENT)
Age: 64
Setting detail: OUTPATIENT SURGERY
Discharge: HOME | End: 2024-07-12
Attending: PODIATRIST | Admitting: PODIATRIST
Payer: COMMERCIAL

## 2024-07-12 ENCOUNTER — APPOINTMENT (OUTPATIENT)
Dept: GENERAL RADIOLOGY | Age: 64
End: 2024-07-12
Attending: PODIATRIST
Payer: COMMERCIAL

## 2024-07-12 VITALS
OXYGEN SATURATION: 95 % | TEMPERATURE: 98.2 F | DIASTOLIC BLOOD PRESSURE: 68 MMHG | WEIGHT: 180 LBS | HEART RATE: 83 BPM | HEIGHT: 62 IN | BODY MASS INDEX: 33.13 KG/M2 | RESPIRATION RATE: 16 BRPM | SYSTOLIC BLOOD PRESSURE: 137 MMHG

## 2024-07-12 DIAGNOSIS — S91.104D OPEN WOUND OF FIFTH TOE OF RIGHT FOOT, SUBSEQUENT ENCOUNTER: Primary | ICD-10-CM

## 2024-07-12 PROCEDURE — 6360000002 HC RX W HCPCS: Performed by: NURSE ANESTHETIST, CERTIFIED REGISTERED

## 2024-07-12 PROCEDURE — 7100000011 HC PHASE II RECOVERY - ADDTL 15 MIN: Performed by: PODIATRIST

## 2024-07-12 PROCEDURE — 6360000002 HC RX W HCPCS: Performed by: PODIATRIST

## 2024-07-12 PROCEDURE — 2500000003 HC RX 250 WO HCPCS: Performed by: NURSE ANESTHETIST, CERTIFIED REGISTERED

## 2024-07-12 PROCEDURE — 3700000000 HC ANESTHESIA ATTENDED CARE: Performed by: PODIATRIST

## 2024-07-12 PROCEDURE — 7100000010 HC PHASE II RECOVERY - FIRST 15 MIN: Performed by: PODIATRIST

## 2024-07-12 PROCEDURE — 2500000003 HC RX 250 WO HCPCS: Performed by: PODIATRIST

## 2024-07-12 PROCEDURE — 3600000012 HC SURGERY LEVEL 2 ADDTL 15MIN: Performed by: PODIATRIST

## 2024-07-12 PROCEDURE — 2709999900 HC NON-CHARGEABLE SUPPLY: Performed by: PODIATRIST

## 2024-07-12 PROCEDURE — 3600000002 HC SURGERY LEVEL 2 BASE: Performed by: PODIATRIST

## 2024-07-12 PROCEDURE — 3700000001 HC ADD 15 MINUTES (ANESTHESIA): Performed by: PODIATRIST

## 2024-07-12 PROCEDURE — 7100000001 HC PACU RECOVERY - ADDTL 15 MIN: Performed by: PODIATRIST

## 2024-07-12 PROCEDURE — 2580000003 HC RX 258: Performed by: STUDENT IN AN ORGANIZED HEALTH CARE EDUCATION/TRAINING PROGRAM

## 2024-07-12 PROCEDURE — 7100000000 HC PACU RECOVERY - FIRST 15 MIN: Performed by: PODIATRIST

## 2024-07-12 RX ORDER — DIPHENHYDRAMINE HYDROCHLORIDE 50 MG/ML
12.5 INJECTION INTRAMUSCULAR; INTRAVENOUS
Status: DISCONTINUED | OUTPATIENT
Start: 2024-07-12 | End: 2024-07-12 | Stop reason: HOSPADM

## 2024-07-12 RX ORDER — HYDROMORPHONE HYDROCHLORIDE 2 MG/ML
0.5 INJECTION, SOLUTION INTRAMUSCULAR; INTRAVENOUS; SUBCUTANEOUS EVERY 5 MIN PRN
Status: DISCONTINUED | OUTPATIENT
Start: 2024-07-12 | End: 2024-07-12 | Stop reason: HOSPADM

## 2024-07-12 RX ORDER — HYDRALAZINE HYDROCHLORIDE 20 MG/ML
10 INJECTION INTRAMUSCULAR; INTRAVENOUS
Status: DISCONTINUED | OUTPATIENT
Start: 2024-07-12 | End: 2024-07-12 | Stop reason: HOSPADM

## 2024-07-12 RX ORDER — LIDOCAINE HYDROCHLORIDE 20 MG/ML
INJECTION, SOLUTION EPIDURAL; INFILTRATION; INTRACAUDAL; PERINEURAL PRN
Status: DISCONTINUED | OUTPATIENT
Start: 2024-07-12 | End: 2024-07-12 | Stop reason: SDUPTHER

## 2024-07-12 RX ORDER — HALOPERIDOL 5 MG/ML
1 INJECTION INTRAMUSCULAR
Status: DISCONTINUED | OUTPATIENT
Start: 2024-07-12 | End: 2024-07-12 | Stop reason: HOSPADM

## 2024-07-12 RX ORDER — SODIUM CHLORIDE, SODIUM LACTATE, POTASSIUM CHLORIDE, CALCIUM CHLORIDE 600; 310; 30; 20 MG/100ML; MG/100ML; MG/100ML; MG/100ML
INJECTION, SOLUTION INTRAVENOUS CONTINUOUS
Status: DISCONTINUED | OUTPATIENT
Start: 2024-07-12 | End: 2024-07-12 | Stop reason: HOSPADM

## 2024-07-12 RX ORDER — SODIUM CHLORIDE 9 MG/ML
INJECTION, SOLUTION INTRAVENOUS PRN
Status: DISCONTINUED | OUTPATIENT
Start: 2024-07-12 | End: 2024-07-12 | Stop reason: HOSPADM

## 2024-07-12 RX ORDER — FENTANYL CITRATE 50 UG/ML
25 INJECTION, SOLUTION INTRAMUSCULAR; INTRAVENOUS
Status: DISCONTINUED | OUTPATIENT
Start: 2024-07-12 | End: 2024-07-12 | Stop reason: HOSPADM

## 2024-07-12 RX ORDER — OXYCODONE HYDROCHLORIDE 5 MG/1
10 TABLET ORAL PRN
Status: DISCONTINUED | OUTPATIENT
Start: 2024-07-12 | End: 2024-07-12 | Stop reason: HOSPADM

## 2024-07-12 RX ORDER — LIDOCAINE HYDROCHLORIDE 10 MG/ML
1 INJECTION, SOLUTION INFILTRATION; PERINEURAL
Status: DISCONTINUED | OUTPATIENT
Start: 2024-07-12 | End: 2024-07-12 | Stop reason: HOSPADM

## 2024-07-12 RX ORDER — SODIUM CHLORIDE 0.9 % (FLUSH) 0.9 %
5-40 SYRINGE (ML) INJECTION EVERY 12 HOURS SCHEDULED
Status: DISCONTINUED | OUTPATIENT
Start: 2024-07-12 | End: 2024-07-12 | Stop reason: HOSPADM

## 2024-07-12 RX ORDER — DOXYCYCLINE HYCLATE 100 MG
100 TABLET ORAL 2 TIMES DAILY
Qty: 14 TABLET | Refills: 0 | Status: SHIPPED | OUTPATIENT
Start: 2024-07-12 | End: 2024-07-19

## 2024-07-12 RX ORDER — FENTANYL CITRATE 50 UG/ML
100 INJECTION, SOLUTION INTRAMUSCULAR; INTRAVENOUS
Status: DISCONTINUED | OUTPATIENT
Start: 2024-07-12 | End: 2024-07-12 | Stop reason: HOSPADM

## 2024-07-12 RX ORDER — LABETALOL HYDROCHLORIDE 5 MG/ML
10 INJECTION, SOLUTION INTRAVENOUS
Status: DISCONTINUED | OUTPATIENT
Start: 2024-07-12 | End: 2024-07-12 | Stop reason: HOSPADM

## 2024-07-12 RX ORDER — MIDAZOLAM HYDROCHLORIDE 2 MG/2ML
2 INJECTION, SOLUTION INTRAMUSCULAR; INTRAVENOUS
Status: DISCONTINUED | OUTPATIENT
Start: 2024-07-12 | End: 2024-07-12 | Stop reason: HOSPADM

## 2024-07-12 RX ORDER — IPRATROPIUM BROMIDE AND ALBUTEROL SULFATE 2.5; .5 MG/3ML; MG/3ML
1 SOLUTION RESPIRATORY (INHALATION)
Status: DISCONTINUED | OUTPATIENT
Start: 2024-07-12 | End: 2024-07-12 | Stop reason: HOSPADM

## 2024-07-12 RX ORDER — PROCHLORPERAZINE EDISYLATE 5 MG/ML
5 INJECTION INTRAMUSCULAR; INTRAVENOUS
Status: DISCONTINUED | OUTPATIENT
Start: 2024-07-12 | End: 2024-07-12 | Stop reason: HOSPADM

## 2024-07-12 RX ORDER — PROPOFOL 10 MG/ML
INJECTION, EMULSION INTRAVENOUS PRN
Status: DISCONTINUED | OUTPATIENT
Start: 2024-07-12 | End: 2024-07-12 | Stop reason: SDUPTHER

## 2024-07-12 RX ORDER — NALOXONE HYDROCHLORIDE 0.4 MG/ML
INJECTION, SOLUTION INTRAMUSCULAR; INTRAVENOUS; SUBCUTANEOUS PRN
Status: DISCONTINUED | OUTPATIENT
Start: 2024-07-12 | End: 2024-07-12 | Stop reason: HOSPADM

## 2024-07-12 RX ORDER — LIDOCAINE HYDROCHLORIDE 10 MG/ML
INJECTION, SOLUTION INFILTRATION; PERINEURAL PRN
Status: DISCONTINUED | OUTPATIENT
Start: 2024-07-12 | End: 2024-07-12 | Stop reason: ALTCHOICE

## 2024-07-12 RX ORDER — OXYCODONE HYDROCHLORIDE 5 MG/1
5 TABLET ORAL PRN
Status: DISCONTINUED | OUTPATIENT
Start: 2024-07-12 | End: 2024-07-12 | Stop reason: HOSPADM

## 2024-07-12 RX ORDER — SODIUM CHLORIDE 0.9 % (FLUSH) 0.9 %
5-40 SYRINGE (ML) INJECTION PRN
Status: DISCONTINUED | OUTPATIENT
Start: 2024-07-12 | End: 2024-07-12 | Stop reason: HOSPADM

## 2024-07-12 RX ORDER — BUPIVACAINE HYDROCHLORIDE 5 MG/ML
INJECTION, SOLUTION EPIDURAL; INTRACAUDAL PRN
Status: DISCONTINUED | OUTPATIENT
Start: 2024-07-12 | End: 2024-07-12 | Stop reason: ALTCHOICE

## 2024-07-12 RX ADMIN — Medication 2000 MG: at 11:53

## 2024-07-12 RX ADMIN — PROPOFOL 20 MG: 10 INJECTION, EMULSION INTRAVENOUS at 11:59

## 2024-07-12 RX ADMIN — SODIUM CHLORIDE, POTASSIUM CHLORIDE, SODIUM LACTATE AND CALCIUM CHLORIDE: 600; 310; 30; 20 INJECTION, SOLUTION INTRAVENOUS at 10:02

## 2024-07-12 RX ADMIN — LIDOCAINE HYDROCHLORIDE 20 MG: 20 INJECTION, SOLUTION EPIDURAL; INFILTRATION; INTRACAUDAL; PERINEURAL at 11:58

## 2024-07-12 RX ADMIN — PROPOFOL 120 MCG/KG/MIN: 10 INJECTION, EMULSION INTRAVENOUS at 12:03

## 2024-07-12 RX ADMIN — PROPOFOL 50 MG: 10 INJECTION, EMULSION INTRAVENOUS at 11:58

## 2024-07-12 NOTE — DISCHARGE INSTRUCTIONS
Follow all written DC instructions attached to this sheet fro Dr Vicente's office    MEDICATION INTERACTION:    During your procedure you potentially received a medication or medications which may reduce the effectiveness of oral contraceptives. Please consider other forms of contraception for 1 month following your procedure if you are currently using oral contraceptives as your primary form of birth control. In addition to this, we recommend continuing your oral contraceptive as prescribed, unless otherwise instructed by your physician, during this time.    ACTIVITY  As tolerated and as directed by your doctor.   You may shower in 24 hours. Do not take a bath until cleared by MD.     DIET  Clear liquids until no nausea or vomiting, then light diet for the first day.  Advance to regular diet on second day, unless your doctor orders otherwise.   If nausea and vomiting continues, call your doctor.     PAIN  Take pain medication as directed by your doctor.   Call your doctor if pain is NOT relieved by medication.      CALL YOUR DOCTOR IF   Excessive bleeding that does not stop after holding pressure over the area.  Temperature of 101 degrees F or above.  Excessive redness, swelling or bruising, and/or green or yellow, smelly discharge from incision.    After general anesthesia or intravenous sedation, for 24 hours or while taking prescription Narcotics:  Limit your activities  Some people will feel drowsy or dizzy for up to a few hours after waking up.  A responsible adult needs to be with you for the next 24 hours  Do not drive and operate hazardous machinery  Do not make important personal or business decisions  Do not drink alcoholic beverages  If you have not urinated within 8 hours after discharge, and you are experiencing discomfort from urinary retention, please go to the nearest ED.  If you have sleep apnea and have a CPAP machine, please use it for all naps and sleeping.  Please use caution when taking

## 2024-07-12 NOTE — ANESTHESIA POSTPROCEDURE EVALUATION
Department of Anesthesiology  Postprocedure Note    Patient: Prisca Murray  MRN: [id number]  YOB: 1960  Date of evaluation: 7/12/2024    Procedure Summary       Date: 07/12/24 Room / Location: [facility] OP OR 01 / [facility]    Anesthesia Start: 1153 Anesthesia Stop: 1258    Procedure: partial phalangectomies toes four and five, right foot (Right: Foot) Diagnosis:       Acquired hammertoe of right foot      (Acquired hammertoe of right foot [M20.41])    Surgeons: Aditi Vicente MD Responsible Provider: Hugo Linares MD    Anesthesia Type: TIVA ASA Status: 2            Anesthesia Type: No value filed.    Glendy Phase I: Glendy Score: 8    Glendy Phase II: Glendy Score: 10    Anesthesia Post Evaluation    Patient location during evaluation: bedside  Patient participation: complete - patient participated  Level of consciousness: awake and alert  Airway patency: patent  Nausea & Vomiting: no vomiting  Cardiovascular status: hemodynamically stable  Respiratory status: acceptable  Hydration status: euvolemic  Pain management: adequate    No notable events documented.

## 2024-07-12 NOTE — ANESTHESIA PRE PROCEDURE
Department of Anesthesiology  Preprocedure Note       Name:  Prisca Murray   Age:  64 y.o.  :  1960                                          MRN:  [id number]         Date:  2024      Surgeon: Surgeon(s):  Aditi Vicente MD    Procedure: Procedure(s):  hemiphalangectomy/interphalangectomy    Medications prior to admission:   Prior to Admission medications    Medication Sig Start Date End Date Taking? Authorizing Provider   meloxicam (MOBIC) 7.5 MG tablet Take 1 tablet by mouth 2 times daily (with meals) 3/28/24   Xin Peguero PA-C   levothyroxine (SYNTHROID) 50 MCG tablet Take 1 tablet by mouth every morning (before breakfast) 3/28/24   Xin Peguero PA-C   rosuvastatin (CRESTOR) 20 MG tablet Take 1 tablet by mouth nightly 3/28/24   Xin Peguero PA-C   lisinopril (PRINIVIL;ZESTRIL) 20 MG tablet Take 1 tablet by mouth daily 23   Xin Peguero PA-C   Omega-3 Fatty Acids (FISH OIL) 1000 MG CAPS Take by mouth    Provider, MD Martin   acetaminophen (TYLENOL) 650 MG extended release tablet Take 1 tablet by mouth every 8 hours as needed    Automatic Reconciliation, Ar   aspirin 81 MG EC tablet Take 1 tablet by mouth daily    Automatic Reconciliation, Ar   glucosamine-chondroitin 500-400 MG CAPS Take 1 capsule by mouth daily    Automatic Reconciliation, Ar       Current medications:    Current Facility-Administered Medications   Medication Dose Route Frequency Provider Last Rate Last Admin    ceFAZolin (ANCEF) 2000 mg in sterile water 20 mL IV syringe  2,000 mg IntraVENous Once Aditi Vicente MD        lidocaine 1 % injection 1 mL  1 mL IntraDERmal Once PRN Hugo Linares MD        fentaNYL (SUBLIMAZE) injection 25 mcg  25 mcg IntraVENous Once PRN Hugo Linares MD        Or    fentaNYL (SUBLIMAZE) injection 100 mcg  100 mcg IntraVENous Once PRN Hugo Linares MD        lactated ringers IV soln infusion   IntraVENous Continuous Hugo Linares  mL/hr at 24 1002

## 2024-07-12 NOTE — H&P
65 y/o F for partial phalangectomies toes four and five right foot.          Department of Anesthesiology  Preprocedure Note                                        Name:  Prisca Murray    Age:  64 y.o.  :  1960                                                 MRN:  [id number]                                                                      Date:  2024              Surgeon: Surgeon(s):  Aditi Vicente MD     Procedure: Procedure(s):  hemiphalangectomy/interphalangectomy     Medications prior to admission:   Home Medications           Prior to Admission medications    Medication Sig Start Date End Date Taking? Authorizing Provider   meloxicam (MOBIC) 7.5 MG tablet Take 1 tablet by mouth 2 times daily (with meals) 3/28/24     Xin Peguero PA-C   levothyroxine (SYNTHROID) 50 MCG tablet Take 1 tablet by mouth every morning (before breakfast) 3/28/24     Xin Peguero PA-C   rosuvastatin (CRESTOR) 20 MG tablet Take 1 tablet by mouth nightly 3/28/24     Xin Peguero PA-C   lisinopril (PRINIVIL;ZESTRIL) 20 MG tablet Take 1 tablet by mouth daily 23     Xin Peguero PA-C   Omega-3 Fatty Acids (FISH OIL) 1000 MG CAPS Take by mouth       Provider, MD Martin   acetaminophen (TYLENOL) 650 MG extended release tablet Take 1 tablet by mouth every 8 hours as needed       Automatic Reconciliation, Ar   aspirin 81 MG EC tablet Take 1 tablet by mouth daily       Automatic Reconciliation, Ar   glucosamine-chondroitin 500-400 MG CAPS Take 1 capsule by mouth daily       Automatic Reconciliation, Ar            Current medications:    Current Facility-Administered Medications             Current Facility-Administered Medications   Medication Dose Route Frequency Provider Last Rate Last Admin    ceFAZolin (ANCEF) 2000 mg in sterile water 20 mL IV syringe  2,000 mg IntraVENous Once Aditi Vicente MD        lidocaine 1 % injection 1 mL  1 mL IntraDERmal Once PRN Hugo Linares MD           Neuro/Psych:   (+) headaches:                 GI/Hepatic/Renal: Neg GI/Hepatic/Renal ROS               Endo/Other:    (+) hypothyroidism::..                         Abdominal:              Vascular: negative vascular ROS.             Other Findings:                Anesthesia Plan        TIVA      ASA 2         Induction: intravenous.        Anesthetic plan and risks discussed with patient.                                   BEE TRUONG MD   7/12/2024

## 2024-07-16 NOTE — OP NOTE
[address]  [address]                            OPERATIVE REPORT      PATIENT NAME: JOSE ENRIQUE DECKER           : 1960  MED REC NO: [id number]                       ROOM: OPOR  ACCOUNT NO: [id number]                       ADMIT DATE: 2024  PROVIDER: Aditi Vicente MD    DATE OF SERVICE:  2024    PREOPERATIVE DIAGNOSES:       1. Hammertoe, right 4th toe.     2. Hammertoe, right 5th toe.    POSTOPERATIVE DIAGNOSES:       1. Hammertoe, right 4th toe.     2. Hammertoe, right 5th toe.    PROCEDURES PERFORMED:       1. Partial phalangectomy, right 4th toe.     2. Partial phalangectomy, right 5th toe.    SURGEON:  Aditi Vicente MD    ASSISTANT:  none    ANESTHESIA:  MAC with 9 mL of 1% lidocaine plain.    ESTIMATED BLOOD LOSS:  Minimal.    SPECIMENS REMOVED:  no specimens sent to pathology    INTRAOPERATIVE FINDINGS:  ulcerated webspace between right toes 4 and 5.      COMPLICATIONS:  None.    IMPLANTS:  Materials:  3-0 Vicryl, 4-0 Vicryl, and 4-0 nylon.    INDICATIONS:  The patient is a pleasant 64-year-old female with significant arthritis in her right 4th and 5th toes.  The arthritis was so severe that the bone spurs were causing an ulceration and chronic infection between the toes.  The patient tried various conservative treatment options, but her pain was not adequately controlled.  Risks and benefits of partial phalangectomy of the digits were reviewed in detail.  All questions answered.  Risks and benefits were communicated.  The patient states she would like to move forward with the procedure.  Consents were signed and the patient presented to [place] outpatient operating room for the procedure.    DESCRIPTION OF PROCEDURE:  The patient was brought to the operating room, placed on the operating table in the supine position.  After MAC anesthesia was obtained, 9 mL of 1% lidocaine plain were

## 2025-01-06 DIAGNOSIS — M15.0 PRIMARY OSTEOARTHRITIS INVOLVING MULTIPLE JOINTS: ICD-10-CM

## 2025-01-06 DIAGNOSIS — M51.369 LUMBAR DEGENERATIVE DISC DISEASE: ICD-10-CM

## 2025-01-06 RX ORDER — MELOXICAM 7.5 MG/1
7.5 TABLET ORAL 2 TIMES DAILY WITH MEALS
Qty: 180 TABLET | Refills: 3 | OUTPATIENT
Start: 2025-01-06

## 2025-02-20 ENCOUNTER — OFFICE VISIT (OUTPATIENT)
Dept: ORTHOPEDIC SURGERY | Age: 65
End: 2025-02-20

## 2025-02-20 DIAGNOSIS — M17.12 PRIMARY OSTEOARTHRITIS OF LEFT KNEE: ICD-10-CM

## 2025-02-20 DIAGNOSIS — M25.562 LEFT KNEE PAIN, UNSPECIFIED CHRONICITY: Primary | ICD-10-CM

## 2025-02-20 NOTE — PROGRESS NOTES
Name: Prisca Murray  YOB: 1960  Gender: female  MRN: [id number]    CC:   Chief Complaint   Patient presents with    Knee Pain     Lt knee         HPI: Prisca Murray is a 64 y.o. female with a PMHx of HTN, HLD, and hypothyroidism here for evaluation of left knee pain.  The pain has been present for years and is becoming worse. The pain is primarily on the Medial side.   she describes the pain as dull, aching, intermittent catching, and and often feels unstable  The pain is worse with going up and/or down stairs, rising after sitting, twisting, and walking  The pain does not radiate down the leg.  she denies numbness and tingling down the leg.   Treatment so far has been activity modification, Tylenol, and meloxicam with little relief.      No Known Allergies      Review of Systems:  As per HPI.  Pertinent positives and negatives are addressed with the patient, particularly those related to musculoskeletal concerns.   Non-orthopaedic concerns were referred back to the primary care physician.    PHYSICAL EXAMINATION:   The patient appears their stated age and they are in no distress.  There were no vitals taken for this visit.      The lower extremities are as described below.  Circulation is normal with palpable pedal pulses bilaterally and no edema.  There is no lymph adenopathy in the popliteal or malleolar region.  The skin is without stasis disease distally bilaterally.  Sensation is intact to light touch bilaterally.  There is mild tenderness to palpation over the medial joint line of the left knee(s)  The gait is noted to be with a slight trendelenburg and antalgia  Range of motion is 0-120 degrees on the right and 0-120 degrees on the left.  left knee: There is 2mm of anterior/posterior translation and 2mm of medial/lateral instability bilaterally.  There is 2 degrees of varus alignment in the left knee.  Limb lengths are equal.  Quadriceps strength is excellent.  Their judgment and

## 2025-03-07 ENCOUNTER — EVALUATION (OUTPATIENT)
Age: 65
End: 2025-03-07
Payer: COMMERCIAL

## 2025-03-07 DIAGNOSIS — G89.29 CHRONIC PAIN OF BOTH KNEES: Primary | ICD-10-CM

## 2025-03-07 DIAGNOSIS — Z78.9 IMPAIRED MOTOR CONTROL: ICD-10-CM

## 2025-03-07 DIAGNOSIS — M25.562 CHRONIC PAIN OF BOTH KNEES: Primary | ICD-10-CM

## 2025-03-07 DIAGNOSIS — M25.661 DECREASED RANGE OF MOTION OF BOTH KNEES: ICD-10-CM

## 2025-03-07 DIAGNOSIS — Z74.09 DECREASED FUNCTIONAL MOBILITY AND ENDURANCE: ICD-10-CM

## 2025-03-07 DIAGNOSIS — R29.898 WEAKNESS OF BOTH LOWER EXTREMITIES: ICD-10-CM

## 2025-03-07 DIAGNOSIS — M25.561 CHRONIC PAIN OF BOTH KNEES: Primary | ICD-10-CM

## 2025-03-07 DIAGNOSIS — M25.662 DECREASED RANGE OF MOTION OF BOTH KNEES: ICD-10-CM

## 2025-03-07 PROCEDURE — 97110 THERAPEUTIC EXERCISES: CPT

## 2025-03-07 PROCEDURE — 97161 PT EVAL LOW COMPLEX 20 MIN: CPT

## 2025-03-07 NOTE — PROGRESS NOTES
[facility]  [address] [facility] [address]  Dept: [phone]      Physical Therapy Initial Assessment     Referring MD: Abelardo Kearney Jr., *  Diagnosis:     ICD-10-CM    1. Chronic pain of both knees  M25.561     M25.562     G89.29       2. Decreased range of motion of both knees  M25.661     M25.662       3. Weakness of both lower extremities  R29.898       4. Impaired motor control  Z78.9       5. Decreased functional mobility and endurance  Z74.09          Therapy precautions:Blood thinners  Co-morbidities affecting plan of care: Hx of microfracture (R, > 10 years), chronic LBP, hypothryroid, HTN    Payor: Payor: HENNA /  /  /  Billing pattern: Commercial- substantial/midpoint time each CPT  Total Timed Codes: 25 min, Total Treatment Time: 40 min  Modifier needed: No    Episode visit count:  1     PERTINENT MEDICAL HISTORY     Past medical and surgical history:   Past Medical History:   Diagnosis Date    Acute medial meniscal tear 12/28/2018    Left    Agatston coronary artery calcium score between 200 and 399 02/26/2021    Calcium Score 272    Aortic regurgitation 09/08/2021    Moderate per ECHO    COVID-19 03/11/2021 & 6/22/22    ARDS    Degenerative disc disease, cervical 07/21/2023    Multilevel, Mild Retrolisthesis of C3 on C4 per X-ray    Hashimoto's disease     Hemorrhoids 08/28/2017    Internal + External per Colonoscopy    Hyperlipidemia     Hypertension     Hypothyroidism, acquired, autoimmune     Impaired glucose tolerance     Lumbar degenerative disc disease 09/20/2019    Mild Degeneration & Moderate Disc Bulges L3-S1 with Moderate Bilateral L>R Foramen Narrowings    Migraine headache     Obstructive sleep apnea on CPAP 09/15/2022    Osteoarthritis     R Toe, L Hand, Bilateral Knees    Osteopenia 4/23/2015    Shingles 08/26/2021    Trigger ring finger     Right Middle    Vitamin B12 deficiency 12/16/2013    Vitamin D deficiency 12/16/2013      Past Surgical

## 2025-03-22 DIAGNOSIS — E06.3 HYPOTHYROIDISM, ACQUIRED, AUTOIMMUNE: ICD-10-CM

## 2025-03-24 RX ORDER — LEVOTHYROXINE SODIUM 50 UG/1
50 TABLET ORAL
Qty: 90 TABLET | Refills: 3 | OUTPATIENT
Start: 2025-03-24

## 2025-04-02 DIAGNOSIS — Z00.00 ROUTINE GENERAL MEDICAL EXAMINATION AT A HEALTH CARE FACILITY: Primary | ICD-10-CM

## 2025-04-10 ENCOUNTER — LAB (OUTPATIENT)
Dept: INTERNAL MEDICINE CLINIC | Facility: CLINIC | Age: 65
End: 2025-04-10

## 2025-04-10 DIAGNOSIS — Z00.00 ROUTINE GENERAL MEDICAL EXAMINATION AT A HEALTH CARE FACILITY: ICD-10-CM

## 2025-04-10 LAB
25(OH)D3 SERPL-MCNC: 32.4 NG/ML (ref 30–100)
ALBUMIN SERPL-MCNC: 4.3 G/DL (ref 3.2–4.6)
ALBUMIN/GLOB SERPL: 1.4 (ref 1–1.9)
ALP SERPL-CCNC: 97 U/L (ref 35–104)
ALT SERPL-CCNC: 36 U/L (ref 8–45)
ANION GAP SERPL CALC-SCNC: 12 MMOL/L (ref 7–16)
AST SERPL-CCNC: 27 U/L (ref 15–37)
BASOPHILS # BLD: 0.03 K/UL (ref 0–0.2)
BASOPHILS NFR BLD: 0.4 % (ref 0–2)
BILIRUB SERPL-MCNC: 0.4 MG/DL (ref 0–1.2)
BUN SERPL-MCNC: 14 MG/DL (ref 8–23)
CALCIUM SERPL-MCNC: 9.8 MG/DL (ref 8.8–10.2)
CHLORIDE SERPL-SCNC: 103 MMOL/L (ref 98–107)
CHOLEST SERPL-MCNC: 132 MG/DL (ref 0–200)
CO2 SERPL-SCNC: 27 MMOL/L (ref 20–29)
CREAT SERPL-MCNC: 0.72 MG/DL (ref 0.6–1.1)
DIFFERENTIAL METHOD BLD: ABNORMAL
EOSINOPHIL # BLD: 0.17 K/UL (ref 0–0.8)
EOSINOPHIL NFR BLD: 2.4 % (ref 0.5–7.8)
ERYTHROCYTE [DISTWIDTH] IN BLOOD BY AUTOMATED COUNT: 13.5 % (ref 11.9–14.6)
EST. AVERAGE GLUCOSE BLD GHB EST-MCNC: 134 MG/DL
GLOBULIN SER CALC-MCNC: 3 G/DL (ref 2.3–3.5)
GLUCOSE SERPL-MCNC: 76 MG/DL (ref 70–99)
HBA1C MFR BLD: 6.3 % (ref 0–5.6)
HCT VFR BLD AUTO: 40.4 % (ref 35.8–46.3)
HDLC SERPL-MCNC: 52 MG/DL (ref 40–60)
HDLC SERPL: 2.6 (ref 0–5)
HGB BLD-MCNC: 13.5 G/DL (ref 11.7–15.4)
IMM GRANULOCYTES # BLD AUTO: 0.02 K/UL (ref 0–0.5)
IMM GRANULOCYTES NFR BLD AUTO: 0.3 % (ref 0–5)
LDLC SERPL CALC-MCNC: 58 MG/DL (ref 0–100)
LYMPHOCYTES # BLD: 1.65 K/UL (ref 0.5–4.6)
LYMPHOCYTES NFR BLD: 23.6 % (ref 13–44)
MCH RBC QN AUTO: 29.2 PG (ref 26.1–32.9)
MCHC RBC AUTO-ENTMCNC: 33.4 G/DL (ref 31.4–35)
MCV RBC AUTO: 87.3 FL (ref 82–102)
MONOCYTES # BLD: 0.95 K/UL (ref 0.1–1.3)
MONOCYTES NFR BLD: 13.6 % (ref 4–12)
NEUTS SEG # BLD: 4.18 K/UL (ref 1.7–8.2)
NEUTS SEG NFR BLD: 59.7 % (ref 43–78)
NRBC # BLD: 0 K/UL (ref 0–0.2)
PLATELET # BLD AUTO: 186 K/UL (ref 150–450)
PMV BLD AUTO: 10.4 FL (ref 9.4–12.3)
POTASSIUM SERPL-SCNC: 4 MMOL/L (ref 3.5–5.1)
PROT SERPL-MCNC: 7.3 G/DL (ref 6.3–8.2)
RBC # BLD AUTO: 4.63 M/UL (ref 4.05–5.2)
SODIUM SERPL-SCNC: 141 MMOL/L (ref 136–145)
T4 FREE SERPL-MCNC: 0.9 NG/DL (ref 0.9–1.7)
TRIGL SERPL-MCNC: 115 MG/DL (ref 0–150)
TSH, 3RD GENERATION: 1.97 UIU/ML (ref 0.27–4.2)
VLDLC SERPL CALC-MCNC: 23 MG/DL (ref 6–23)
WBC # BLD AUTO: 7 K/UL (ref 4.3–11.1)

## 2025-04-14 ENCOUNTER — RESULTS FOLLOW-UP (OUTPATIENT)
Dept: INTERNAL MEDICINE CLINIC | Facility: CLINIC | Age: 65
End: 2025-04-14

## 2025-04-16 NOTE — PROGRESS NOTES
Prisca Murray (:  1960) is a 64 y.o. female,Established patient, here for evaluation of the following chief complaint(s):  Annual Exam (Routine annual physical exam with lab review. )          Assessment/Plan  1. Routine general medical examination at a health care facility  -     EKG 12 Lead  2. Hypothyroidism, acquired, autoimmune  -     levothyroxine (SYNTHROID) 50 MCG tablet; Take 1 tablet by mouth every morning (before breakfast), Disp-90 tablet, R-3Normal  -     TSH; Future  3. Primary osteoarthritis involving multiple joints  -     meloxicam (MOBIC) 7.5 MG tablet; Take 1 tablet by mouth 2 times daily (with meals), Disp-180 tablet, R-3Normal  4. Lumbar degenerative disc disease  -     meloxicam (MOBIC) 7.5 MG tablet; Take 1 tablet by mouth 2 times daily (with meals), Disp-180 tablet, R-3Normal  5. Hyperlipidemia LDL goal <100  -     rosuvastatin (CRESTOR) 20 MG tablet; Take 1 tablet by mouth nightly, Disp-90 tablet, R-3Normal  -     Comprehensive Metabolic Panel; Future  -     Lipid Panel; Future  6. Agatston coronary artery calcium score between 200 and 399  -     rosuvastatin (CRESTOR) 20 MG tablet; Take 1 tablet by mouth nightly, Disp-90 tablet, R-3Normal  -     Lipid Panel; Future  7. Screening for cervical cancer  -     PAP IG, Aptima HPV and rfx 16/18,45 ([id number]); Future  8. Elevated blood pressure reading in office with diagnosis of hypertension  -     lisinopril (PRINIVIL;ZESTRIL) 20 MG tablet; Take 1 tablet by mouth daily, Disp-90 tablet, R-3Normal  -     Urinalysis with Reflex to Culture; Future  -     Comprehensive Metabolic Panel; Future  -     CBC with Auto Differential; Future  9. COVID-19 vaccine regimen to maintain immunity declined  10. Need for shingles vaccine  -     zoster recombinant adjuvanted vaccine (SHINGRIX) 50 MCG/0.5ML SUSR injection; Inject 0.5 mLs into the muscle See Admin Instructions 1 dose now and repeat in 2-6 months, Disp-0.5 mL, R-1Print  11. Screening

## 2025-04-17 ENCOUNTER — RESULTS FOLLOW-UP (OUTPATIENT)
Dept: INTERNAL MEDICINE CLINIC | Facility: CLINIC | Age: 65
End: 2025-04-17

## 2025-04-17 ENCOUNTER — OFFICE VISIT (OUTPATIENT)
Dept: INTERNAL MEDICINE CLINIC | Facility: CLINIC | Age: 65
End: 2025-04-17

## 2025-04-17 VITALS
HEART RATE: 78 BPM | BODY MASS INDEX: 34.96 KG/M2 | HEIGHT: 62 IN | WEIGHT: 190 LBS | DIASTOLIC BLOOD PRESSURE: 110 MMHG | OXYGEN SATURATION: 95 % | TEMPERATURE: 97.3 F | SYSTOLIC BLOOD PRESSURE: 170 MMHG

## 2025-04-17 DIAGNOSIS — R73.02 IMPAIRED GLUCOSE TOLERANCE: ICD-10-CM

## 2025-04-17 DIAGNOSIS — Z28.21 COVID-19 VACCINE REGIMEN TO MAINTAIN IMMUNITY DECLINED: ICD-10-CM

## 2025-04-17 DIAGNOSIS — I10 ELEVATED BLOOD PRESSURE READING IN OFFICE WITH DIAGNOSIS OF HYPERTENSION: ICD-10-CM

## 2025-04-17 DIAGNOSIS — Z23 NEED FOR SHINGLES VACCINE: ICD-10-CM

## 2025-04-17 DIAGNOSIS — M51.360 DEGENERATION OF INTERVERTEBRAL DISC OF LUMBAR REGION WITH DISCOGENIC BACK PAIN: ICD-10-CM

## 2025-04-17 DIAGNOSIS — R93.1 AGATSTON CORONARY ARTERY CALCIUM SCORE BETWEEN 200 AND 399: ICD-10-CM

## 2025-04-17 DIAGNOSIS — Z12.31 SCREENING MAMMOGRAM FOR BREAST CANCER: ICD-10-CM

## 2025-04-17 DIAGNOSIS — E78.5 HYPERLIPIDEMIA LDL GOAL <100: ICD-10-CM

## 2025-04-17 DIAGNOSIS — M15.0 PRIMARY OSTEOARTHRITIS INVOLVING MULTIPLE JOINTS: ICD-10-CM

## 2025-04-17 DIAGNOSIS — Z00.00 ROUTINE GENERAL MEDICAL EXAMINATION AT A HEALTH CARE FACILITY: Primary | ICD-10-CM

## 2025-04-17 DIAGNOSIS — E06.3 HYPOTHYROIDISM, ACQUIRED, AUTOIMMUNE: ICD-10-CM

## 2025-04-17 DIAGNOSIS — Z12.4 SCREENING FOR CERVICAL CANCER: ICD-10-CM

## 2025-04-17 DIAGNOSIS — N75.0 BARTHOLIN GLAND CYST: ICD-10-CM

## 2025-04-17 DIAGNOSIS — E55.9 VITAMIN D DEFICIENCY: ICD-10-CM

## 2025-04-17 RX ORDER — ZOSTER VACCINE RECOMBINANT, ADJUVANTED 50 MCG/0.5
0.5 KIT INTRAMUSCULAR SEE ADMIN INSTRUCTIONS
Qty: 0.5 ML | Refills: 1 | Status: SHIPPED | OUTPATIENT
Start: 2025-04-17 | End: 2025-10-14

## 2025-04-17 SDOH — ECONOMIC STABILITY: FOOD INSECURITY: WITHIN THE PAST 12 MONTHS, YOU WORRIED THAT YOUR FOOD WOULD RUN OUT BEFORE YOU GOT MONEY TO BUY MORE.: NEVER TRUE

## 2025-04-17 SDOH — ECONOMIC STABILITY: FOOD INSECURITY: WITHIN THE PAST 12 MONTHS, THE FOOD YOU BOUGHT JUST DIDN'T LAST AND YOU DIDN'T HAVE MONEY TO GET MORE.: NEVER TRUE

## 2025-04-17 ASSESSMENT — PATIENT HEALTH QUESTIONNAIRE - PHQ9
SUM OF ALL RESPONSES TO PHQ QUESTIONS 1-9: 0
SUM OF ALL RESPONSES TO PHQ QUESTIONS 1-9: 0
2. FEELING DOWN, DEPRESSED OR HOPELESS: NOT AT ALL
SUM OF ALL RESPONSES TO PHQ QUESTIONS 1-9: 0
1. LITTLE INTEREST OR PLEASURE IN DOING THINGS: NOT AT ALL
SUM OF ALL RESPONSES TO PHQ QUESTIONS 1-9: 0

## 2025-04-22 LAB
COLLECTION METHOD: NORMAL
CYTOLOGIST CVX/VAG CYTO: NORMAL
CYTOLOGY CVX/VAG DOC THIN PREP: NORMAL
HPV APTIMA: NEGATIVE
HPV GENOTYPE REFLEX: NORMAL
Lab: NORMAL
PAP SOURCE: NORMAL
PATH REPORT.FINAL DX SPEC: NORMAL
STAT OF ADQ CVX/VAG CYTO-IMP: NORMAL

## 2025-04-22 NOTE — RESULT ENCOUNTER NOTE
Pap smear is negative including HPV.  Plan to repeat in 5 years if she decides to keep doing these post 66 years old.

## 2025-04-27 RX ORDER — MELOXICAM 7.5 MG/1
7.5 TABLET ORAL 2 TIMES DAILY WITH MEALS
Qty: 180 TABLET | Refills: 3 | Status: SHIPPED | OUTPATIENT
Start: 2025-04-27

## 2025-04-27 RX ORDER — LISINOPRIL 20 MG/1
20 TABLET ORAL DAILY
Qty: 90 TABLET | Refills: 3 | Status: SHIPPED | OUTPATIENT
Start: 2025-04-27

## 2025-04-27 RX ORDER — ROSUVASTATIN CALCIUM 20 MG/1
20 TABLET, COATED ORAL
Qty: 90 TABLET | Refills: 3 | Status: SHIPPED | OUTPATIENT
Start: 2025-04-27

## 2025-04-27 RX ORDER — LEVOTHYROXINE SODIUM 50 UG/1
50 TABLET ORAL
Qty: 90 TABLET | Refills: 3 | Status: SHIPPED | OUTPATIENT
Start: 2025-04-27

## 2025-05-07 ENCOUNTER — TREATMENT (OUTPATIENT)
Age: 65
End: 2025-05-07
Payer: COMMERCIAL

## 2025-05-07 DIAGNOSIS — Z74.09 DECREASED FUNCTIONAL MOBILITY AND ENDURANCE: ICD-10-CM

## 2025-05-07 DIAGNOSIS — M25.661 DECREASED RANGE OF MOTION OF BOTH KNEES: ICD-10-CM

## 2025-05-07 DIAGNOSIS — M25.562 CHRONIC PAIN OF BOTH KNEES: Primary | ICD-10-CM

## 2025-05-07 DIAGNOSIS — M25.561 CHRONIC PAIN OF BOTH KNEES: Primary | ICD-10-CM

## 2025-05-07 DIAGNOSIS — G89.29 CHRONIC PAIN OF BOTH KNEES: Primary | ICD-10-CM

## 2025-05-07 DIAGNOSIS — Z78.9 IMPAIRED MOTOR CONTROL: ICD-10-CM

## 2025-05-07 DIAGNOSIS — M25.662 DECREASED RANGE OF MOTION OF BOTH KNEES: ICD-10-CM

## 2025-05-07 DIAGNOSIS — R29.898 WEAKNESS OF BOTH LOWER EXTREMITIES: ICD-10-CM

## 2025-05-07 PROCEDURE — 97530 THERAPEUTIC ACTIVITIES: CPT

## 2025-05-07 PROCEDURE — 97140 MANUAL THERAPY 1/> REGIONS: CPT

## 2025-05-07 PROCEDURE — 97110 THERAPEUTIC EXERCISES: CPT

## 2025-05-07 NOTE — PROGRESS NOTES
[facility]  [address] [facility] [address]  Dept: [phone]      Physical Therapy Discharge     Referring MD: Abelardo Kearney Jr., *  Diagnosis:     ICD-10-CM    1. Chronic pain of both knees  M25.561     M25.562     G89.29       2. Decreased range of motion of both knees  M25.661     M25.662       3. Weakness of both lower extremities  R29.898       4. Impaired motor control  Z78.9       5. Decreased functional mobility and endurance  Z74.09          Therapy precautions:Blood thinners  Co-morbidities affecting plan of care: Hx of microfracture (R, > 10 years), chronic LBP, hypothryroid, HTN    Chief complaints/history of injury: Patient reports longstanding history of bilateral knee pain. Pain is intermittent, and requires her to stand, gather herself, and then walk reasonably well. Extended duration standing will require consistent shifting to alleviate posterior discomfort. She has been evaluated for consideration of TKA, with planned L side within the next 9 months.    Date symptoms began: >5 years  Nature of condition: Chronic (continuous duration > 3 months)  Primary cause of current episode: Repetitive  How did symptoms start: see above  Describe current symptoms: B knee medial soreness    Patient Stated Goals: develop HEP and prepare for surgery    Initial Evaluation: 3/7/25  Last Progress Note: n/a  Total Visits: 2   Payor: Payor: HENNA /  /  /   Billing pattern: Commercial- substantial/midpoint time each CPT    Total Timed Codes: 30 min, Total Treatment Time: 30 min  Modifier needed: No    SUBJECTIVE     Pt reports good tolerance of exercise activities, and is happy to be able to perform cardio activities, utilizing both the bike and elliptical at the gym. She notes less pain overall, and it has isolated to only occasionally occurrence at the L posterior and medial knee. She has not scheduled her TKA, but is planning to schedule it in Feburary    Medications: no changes

## (undated) DEVICE — BNDG,ELSTC,MATRIX,STRL,4"X5YD,LF,HOOK&LP: Brand: MEDLINE

## (undated) DEVICE — SUTURE NONABSORBABLE MONOFILAMENT 4-0 PS-2 18 IN BLK ETHILON 1667G

## (undated) DEVICE — SPONGE GZ W4XL4IN COT 12 PLY TYP VII WVN C FLD DSGN STERILE

## (undated) DEVICE — DRESSING,GAUZE,XEROFORM,CURAD,1"X8",ST: Brand: CURAD

## (undated) DEVICE — 1010 S-DRAPE TOWEL DRAPE 10/BX: Brand: STERI-DRAPE™

## (undated) DEVICE — BANDAGE,ELASTIC,ESMARK,STERILE,4"X9',LF: Brand: MEDLINE

## (undated) DEVICE — SUTURE ETHILON SZ 3-0 L18IN NONABSORBABLE BLK PS-2 L19MM 3/8 1669H

## (undated) DEVICE — PODIATRY: Brand: MEDLINE INDUSTRIES, INC.

## (undated) DEVICE — BANDAGE GZ W2XL75IN ST RAYON POLY CNFRM STRTCH LTWT

## (undated) DEVICE — STOCKINETTE,DOUBLE PLY,6X48,STERILE: Brand: MEDLINE

## (undated) DEVICE — APPLICATOR MEDICATED 26 CC SOLUTION HI LT ORNG CHLORAPREP

## (undated) DEVICE — GLOVE SURG SZ 65 CRM LTX FREE POLYISOPRENE POLYMER BEAD ANTI

## (undated) DEVICE — DRAPE C ARM W54XL84IN MINI FOR OEC 6800

## (undated) DEVICE — SOLUTION IRRIG 1000ML 0.9% SOD CHL USP POUR PLAS BTL